# Patient Record
Sex: FEMALE | Race: WHITE | NOT HISPANIC OR LATINO | Employment: FULL TIME | URBAN - METROPOLITAN AREA
[De-identification: names, ages, dates, MRNs, and addresses within clinical notes are randomized per-mention and may not be internally consistent; named-entity substitution may affect disease eponyms.]

---

## 2017-02-03 ENCOUNTER — ALLSCRIPTS OFFICE VISIT (OUTPATIENT)
Dept: OTHER | Facility: OTHER | Age: 32
End: 2017-02-03

## 2017-02-03 LAB — HCG, QUALITATIVE (HISTORICAL): NEGATIVE

## 2018-01-14 VITALS
WEIGHT: 153 LBS | BODY MASS INDEX: 24.01 KG/M2 | SYSTOLIC BLOOD PRESSURE: 110 MMHG | DIASTOLIC BLOOD PRESSURE: 74 MMHG | HEIGHT: 67 IN

## 2018-02-09 ENCOUNTER — OFFICE VISIT (OUTPATIENT)
Dept: OBGYN CLINIC | Facility: CLINIC | Age: 33
End: 2018-02-09
Payer: COMMERCIAL

## 2018-02-09 VITALS
DIASTOLIC BLOOD PRESSURE: 78 MMHG | HEIGHT: 68 IN | BODY MASS INDEX: 26.07 KG/M2 | SYSTOLIC BLOOD PRESSURE: 116 MMHG | WEIGHT: 172 LBS

## 2018-02-09 DIAGNOSIS — Z01.419 ENCOUNTER FOR GYNECOLOGICAL EXAMINATION WITHOUT ABNORMAL FINDING: Primary | ICD-10-CM

## 2018-02-09 DIAGNOSIS — E04.9 ENLARGED THYROID: ICD-10-CM

## 2018-02-09 PROCEDURE — S0612 ANNUAL GYNECOLOGICAL EXAMINA: HCPCS | Performed by: PHYSICIAN ASSISTANT

## 2018-02-09 RX ORDER — ALBUTEROL SULFATE 90 UG/1
2 AEROSOL, METERED RESPIRATORY (INHALATION) EVERY 4 HOURS PRN
COMMUNITY
Start: 2015-09-08 | End: 2020-07-21

## 2018-02-09 NOTE — PROGRESS NOTES
Assessment/Plan:     Problem List Items Addressed This Visit     Encounter for gynecological examination without abnormal finding - Primary     Reviewed pap smear guidelines  Pap with HPV done today  Patient's family hx significant for mother with melanoma and PGM with ovarian cancer, reviewed genetic testing with patient  Information for [facility] genetic counselor given  Reviewed Vitamin E and evening primrose oil as well as decreasing salt and caffeine intake to help with breast pain, reviewed no defined masses today  Most consistent with fibrocystic breast tissue  Return to office for annual or as needed  Relevant Orders    GP Liquid-Based Pap + HPV Plus    Enlarged thyroid     Reviewed with patient diffuse thyromegaly on today's exam will get thyroid ultrasound and blood work done to further evaluate  Relevant Orders    US thyroid    T4, free    TSH, 3rd generation            Subjective:      Patient ID: Guzman Blevins is a 29 y o  y o  female  HPI  30 yo seen for annual exam  Currently on Nexplanon inserted 2/3/2017  Tolerates well  Menses irregular, infrequent  Last pap: 12/28/2016 NILM  Hx of abnormal pap smears ASC-H the year previous  Patient does not want any children and wants  to have vasectomy  Patient reports right sided breast pain, comes and goes  Had ultrasound done on that area previously which was normal  No palpable lumps, skin changes, trauma, nipple discharge, fever, or chills  The following portions of the patient's history were reviewed and updated as appropriate:   She  has a past medical history of Anxiety; Asthma; Cervical lesion; and Degeneration, intervertebral disc, lumbar  She  does not have any pertinent problems on file  She  has a past surgical history that includes Rhinoplasty; Tooth extraction; and Shoulder surgery    Her family history includes Cancer in her mother and paternal uncle; Diabetes in her family and father; Hyperlipidemia in her brother; Hypertension in her family and father; Melanoma in her mother; Ovarian cancer in her paternal grandmother  She  reports that she has never smoked  She has never used smokeless tobacco  She reports that she drinks alcohol  She reports that she does not use drugs  Current Outpatient Prescriptions   Medication Sig Dispense Refill    albuterol (VENTOLIN HFA) 90 mcg/act inhaler Inhale      Etonogestrel (NEXPLANON) 68 MG IMPL Inject under the skin      fluticasone-salmeterol (ADVAIR DISKUS) 500-50 mcg/dose Inhale       No current facility-administered medications for this visit  She is allergic to cat hair extract and pollen extract       Menstrual History:  OB History      Para Term  AB Living    0 0 0 0 0 0    SAB TAB Ectopic Multiple Live Births    0 0 0 0 0         Menarche age: 15  No LMP recorded (lmp unknown)  Patient is not currently having periods (Reason: Premenopausal)  Review of Systems   Constitutional: Negative for fatigue, fever and unexpected weight change  HENT: Negative for dental problem and sinus pressure  Eyes: Negative for visual disturbance  Respiratory: Negative for cough, shortness of breath and wheezing  Cardiovascular: Negative for chest pain  Gastrointestinal: Negative for abdominal pain, blood in stool, constipation, diarrhea, nausea and vomiting  Endocrine: Negative for polydipsia  Genitourinary: Negative for difficulty urinating, dyspareunia, dysuria, frequency, hematuria, pelvic pain and urgency  Musculoskeletal: Negative for arthralgias and back pain  Neurological: Negative for dizziness, seizures, light-headedness and headaches  Psychiatric/Behavioral: Negative for suicidal ideas  The patient is not nervous/anxious  Objective:     Physical Exam   Constitutional: She is oriented to person, place, and time  She appears well-developed and well-nourished     Genitourinary: Vagina normal and uterus normal  There is no rash, tenderness, lesion, injury or Bartholin's cyst on the right labia  There is no rash, tenderness, lesion, injury or Bartholin's cyst on the left labia  Vagina exhibits no lesion  No erythema, tenderness or bleeding in the vagina  No signs of injury around the vagina  No vaginal discharge found  Right adnexum does not display mass, does not display tenderness and does not display fullness  Left adnexum does not display mass, does not display tenderness and does not display fullness  Cervix does not exhibit motion tenderness, lesion or discharge  Uterus is not enlarged, tender, exhibiting a mass, irregular (is regular) or mobile  HENT:   Head: Normocephalic and atraumatic  Neck: Thyromegaly (diffuse) present  Cardiovascular: Normal rate, regular rhythm and normal heart sounds  Exam reveals no gallop and no friction rub  No murmur heard  Pulmonary/Chest: Effort normal and breath sounds normal  No respiratory distress  She has no wheezes  Right breast exhibits tenderness (mildly tender in lateral lower quadrant, no palpable masses)  Right breast exhibits no inverted nipple, no mass, no nipple discharge and no skin change  Left breast exhibits no inverted nipple, no mass, no nipple discharge, no skin change and no tenderness  Breasts are symmetrical  There is no breast swelling  Abdominal: Soft  She exhibits no distension and no mass  There is no tenderness  There is no rebound and no guarding  No hernia  Lymphadenopathy:     She has no cervical adenopathy  Right: No inguinal adenopathy present  Left: No inguinal adenopathy present  Neurological: She is alert and oriented to person, place, and time  Skin: Skin is warm and dry  Psychiatric: She has a normal mood and affect   Her behavior is normal

## 2018-02-09 NOTE — ASSESSMENT & PLAN NOTE
Reviewed pap smear guidelines  Pap with HPV done today  Patient's family hx significant for mother with melanoma and PGM with ovarian cancer, reviewed genetic testing with patient  Information for [facility]'s genetic counselor given  Reviewed Vitamin E and evening primrose oil as well as decreasing salt and caffeine intake to help with breast pain, reviewed no defined masses today  Most consistent with fibrocystic breast tissue  Return to office for annual or as needed

## 2018-02-09 NOTE — ASSESSMENT & PLAN NOTE
Reviewed with patient diffuse thyromegaly on today's exam will get thyroid ultrasound and blood work done to further evaluate

## 2018-02-11 LAB
T4 FREE SERPL-MCNC: 1.1 NG/DL (ref 0.8–1.8)
TSH SERPL-ACNC: 3.29 MIU/L

## 2018-02-14 ENCOUNTER — HOSPITAL ENCOUNTER (OUTPATIENT)
Dept: ULTRASOUND IMAGING | Facility: HOSPITAL | Age: 33
Discharge: HOME | End: 2018-02-14
Attending: PHYSICIAN ASSISTANT
Payer: COMMERCIAL

## 2018-02-14 PROCEDURE — 76536 US EXAM OF HEAD AND NECK: CPT

## 2018-02-16 ENCOUNTER — TELEPHONE (OUTPATIENT)
Dept: OBGYN CLINIC | Facility: CLINIC | Age: 33
End: 2018-02-16

## 2018-02-27 LAB
HPV HR 12 DNA CVX QL NAA+PROBE: NOT DETECTED
HPV16 DNA SPEC QL NAA+PROBE: DETECTED
HPV18 DNA SPEC QL NAA+PROBE: NOT DETECTED
THIN PREP CVX: ABNORMAL

## 2018-02-28 PROCEDURE — 57454 BX/CURETT OF CERVIX W/SCOPE: CPT | Performed by: OBSTETRICS & GYNECOLOGY

## 2018-02-28 NOTE — PROGRESS NOTES
Colposcopy  Date/Time: 2/28/2018 10:35 AM  Performed by: Rah Natarajan by: Lubna Berumen     Consent:     Consent obtained:  Verbal and written    Consent given by:  Patient    Procedural risks discussed:  Bleeding    Patient questions answered: yes      Patient agrees, verbalizes understanding, and wants to proceed: yes    Pre-procedure:     Pre-procedure timeout performed: yes      Prepped with: acetic acid    Indication:     Indication:  ASC-H  Procedure:     Procedure: Colposcopy w/ cervical biopsy and ECC      Under satisfactory analgesia the patient was prepped and draped in the dorsal lithotomy position: yes      [place] speculum was placed in the vagina: yes      Under colposcopic examination the transition zone was seen in entirety: yes      Endocervix was curetted using a Kevorkian curette: yes      Cervical biopsy performed with a cervical biopsy punch: yes      Monsel's solution was applied: yes      Biopsy(s): yes      Location:  6:00    Specimen to pathology: yes    Post-procedure:     Findings: Bleeding, Friable cervix, Mosaicism and White epithelium      Impression: Low grade cervical dysplasia      Patient tolerance of procedure: Tolerated well, no immediate complications  Comments:      Higinio Durand is here today for colposcopic evaluation  Her Pap smear had come back with atypical squamous cells of undetermined significance cannot rule out high-grade lesion, and positive HPV 16  She reports that she had had an abnormal Pap smear in 25years old and underwent colposcopic evaluation and a freezing at that time  She has had no Pap smears abnormality since then  She currently is have the Nexplanon placed for contraception and is doing well with it rarely gets a menses when she does this very light in non problematic  She is in a stable relationship and has had no new partners  Reviewed the Pap smear with her and we reviewed colposcopic procedure  She gave us her consent and we proceeded  The colposcopic findings showed a area in the endocervical canal which appeared to be is low grade squamous intraepithelial lesion on Pap smear is taken approximately 6 o'clock  ECC was performed as well and Monsel's applied for hemostasis  The patient tolerated procedure well will review the results with her when available  And follow-up as indicated by the result

## 2018-03-01 ENCOUNTER — PROCEDURE VISIT (OUTPATIENT)
Dept: OBGYN CLINIC | Facility: CLINIC | Age: 33
End: 2018-03-01
Payer: COMMERCIAL

## 2018-03-01 VITALS — DIASTOLIC BLOOD PRESSURE: 60 MMHG | SYSTOLIC BLOOD PRESSURE: 128 MMHG | BODY MASS INDEX: 26.46 KG/M2 | WEIGHT: 174 LBS

## 2018-03-01 DIAGNOSIS — R87.810 ASCUS WITH POSITIVE HIGH RISK HPV CERVICAL: Primary | ICD-10-CM

## 2018-03-01 DIAGNOSIS — R87.611 PAP SMEAR OF CERVIX WITH ASCUS, CANNOT EXCLUDE HGSIL: ICD-10-CM

## 2018-03-01 DIAGNOSIS — R87.610 ASCUS WITH POSITIVE HIGH RISK HPV CERVICAL: Primary | ICD-10-CM

## 2018-03-06 ENCOUNTER — TELEPHONE (OUTPATIENT)
Dept: OBGYN CLINIC | Facility: CLINIC | Age: 33
End: 2018-03-06

## 2018-03-06 LAB — OTHER STN CVX: ABNORMAL

## 2018-03-06 NOTE — TELEPHONE ENCOUNTER
[facility]   Making sure you received abnormal test result

## 2018-03-07 LAB — BIOPSY SPEC-IMP: NORMAL

## 2018-03-14 ENCOUNTER — PROCEDURE VISIT (OUTPATIENT)
Dept: OBGYN CLINIC | Facility: CLINIC | Age: 33
End: 2018-03-14
Payer: COMMERCIAL

## 2018-03-14 VITALS
BODY MASS INDEX: 26.22 KG/M2 | DIASTOLIC BLOOD PRESSURE: 72 MMHG | SYSTOLIC BLOOD PRESSURE: 116 MMHG | HEIGHT: 68 IN | WEIGHT: 173 LBS

## 2018-03-14 DIAGNOSIS — Z30.46 NEXPLANON REMOVAL: Primary | ICD-10-CM

## 2018-03-14 PROBLEM — Z01.419 ENCOUNTER FOR GYNECOLOGICAL EXAMINATION WITHOUT ABNORMAL FINDING: Status: RESOLVED | Noted: 2018-02-09 | Resolved: 2018-03-14

## 2018-03-14 PROCEDURE — 11982 REMOVE DRUG IMPLANT DEVICE: CPT | Performed by: PHYSICIAN ASSISTANT

## 2018-03-14 NOTE — PROGRESS NOTES
Remove and insert drug implant  Date/Time: 3/14/2018 9:44 AM  Performed by: Mandy Hammond  Authorized by: Mandy Hammond     Consent:     Consent obtained:  Verbal    Consent given by:  Patient    Procedural risks discussed:  Infection    Patient questions answered: yes      Patient agrees, verbalizes understanding, and wants to proceed: yes    Indication:     Indication: Presence of non-biodegradable drug delivery implant    Pre-procedure:     Prepped with: povidone-iodine      Local anesthetic:  Lidocaine 1%  Procedure:     Procedure:  Removal    Small stab incision was made in arm: yes      Left/right:  Right  Comments:      Pt tolerated procedure well  Pt desires removal of Nexplanon in order to "get hormones in order" Pt does not desire children at all and partner plans vasectomy  R/w pt to plan condoms and withdrawal for [facility] until vasectomy completed

## 2018-03-15 ENCOUNTER — OFFICE VISIT (OUTPATIENT)
Dept: OBGYN CLINIC | Facility: CLINIC | Age: 33
End: 2018-03-15
Payer: COMMERCIAL

## 2018-03-15 ENCOUNTER — TRANSCRIBE ORDERS (OUTPATIENT)
Dept: OBGYN CLINIC | Facility: CLINIC | Age: 33
End: 2018-03-15

## 2018-03-15 VITALS
WEIGHT: 175 LBS | BODY MASS INDEX: 26.52 KG/M2 | SYSTOLIC BLOOD PRESSURE: 118 MMHG | HEIGHT: 68 IN | DIASTOLIC BLOOD PRESSURE: 62 MMHG

## 2018-03-15 DIAGNOSIS — R87.611 PAP SMEAR OF CERVIX WITH ASCUS, CANNOT EXCLUDE HGSIL: ICD-10-CM

## 2018-03-15 DIAGNOSIS — Z01.818 PREOPERATIVE TESTING: Primary | ICD-10-CM

## 2018-03-15 DIAGNOSIS — R87.610 ASCUS WITH POSITIVE HIGH RISK HPV CERVICAL: ICD-10-CM

## 2018-03-15 DIAGNOSIS — N87.1 MODERATE DYSPLASIA OF CERVIX (CIN II): Primary | ICD-10-CM

## 2018-03-15 DIAGNOSIS — R87.810 ASCUS WITH POSITIVE HIGH RISK HPV CERVICAL: ICD-10-CM

## 2018-03-15 PROCEDURE — 99213 OFFICE O/P EST LOW 20 MIN: CPT | Performed by: OBSTETRICS & GYNECOLOGY

## 2018-03-15 NOTE — PROGRESS NOTES
Assessment/Plan: Moderate to severe dysplasia noted on cervical biopsy during colposcopic directed biopsies, negative E cc  Would like to proceed with LEEP for further diagnosis and treatment  Subjective:      Patient ID: Jhonathan Pathak is a 29 y o  female  Ezell Paget is here today to review options for follow-up her colposcopically directed biopsies  She had returned with a Pap smear showing atypical squamous cells of undetermined significance cannot rule out a high-grade lesion and high risk HPV 16  She underwent colposcopic directed biopsies and a biopsy from the 6 o'clock region did show ERNIE 2-3  Her endocervical curettage was negative  Reviewed the risk of following the findings conservatively versus proceeding with a LEEP  She is not planning on childbearing in the future  Although we did review the risks of a cervical cone biopsy with incompetent cervix  She is interested in proceeding with the LEEP to both treat and further diagnose the cervical changes  We again reviewed the risks and benefits associated with the procedure and the anticipated postoperative follow-up  We will go ahead and schedule the LEEP for the moderate to severe cervical dysplasia  The following portions of the patient's history were reviewed and updated as appropriate:   She  has a past medical history of Anxiety; Asthma; Cervical lesion; and Degeneration, intervertebral disc, lumbar    She   Patient Active Problem List    Diagnosis Date Noted    Moderate dysplasia of cervix (ERNIE II) 03/15/2018    Nexplanon removal 03/14/2018    ASCUS with positive high risk HPV cervical 03/01/2018    Pap smear of cervix with ASCUS, cannot exclude HGSIL 03/01/2018    Enlarged thyroid 02/09/2018    Other tenosynovitis of hand and wrist 01/29/2014    Neck pain 10/23/2013    DDD (degenerative disc disease), cervical 09/04/2013    Lower back pain 09/04/2013    Myofascial pain syndrome 09/04/2013    Disc degeneration, lumbar 07/26/2013    Allergic rhinitis 07/25/2013     She  has a past surgical history that includes Rhinoplasty; Tooth extraction; and Shoulder surgery  Her family history includes Cancer in her mother and paternal uncle; Diabetes in her family and father; Hyperlipidemia in her brother; Hypertension in her family and father; Melanoma in her mother; Ovarian cancer in her paternal grandmother  She  reports that she has never smoked  She has never used smokeless tobacco  She reports that she drinks alcohol  She reports that she does not use drugs  Current Outpatient Prescriptions   Medication Sig Dispense Refill    albuterol (VENTOLIN HFA) 90 mcg/act inhaler Inhale      Etonogestrel (NEXPLANON) 68 MG IMPL Inject under the skin      fluticasone-salmeterol (ADVAIR DISKUS) 500-50 mcg/dose Inhale       No current facility-administered medications for this visit  Current Outpatient Prescriptions on File Prior to Visit   Medication Sig    albuterol (VENTOLIN HFA) 90 mcg/act inhaler Inhale    Etonogestrel (NEXPLANON) 68 MG IMPL Inject under the skin    fluticasone-salmeterol (ADVAIR DISKUS) 500-50 mcg/dose Inhale     No current facility-administered medications on file prior to visit  She is allergic to cat hair extract and pollen extract       Review of Systems   Constitutional: Positive for fatigue  Negative for fever and unexpected weight change  HENT: Negative for dental problem, mouth sores, nosebleeds, rhinorrhea, sinus pain, sinus pressure and sore throat  Eyes: Negative for pain, discharge and visual disturbance  Respiratory: Negative for cough, chest tightness, shortness of breath and wheezing  Cardiovascular: Negative for chest pain, palpitations and leg swelling  Gastrointestinal: Negative for blood in stool, constipation, diarrhea, nausea and vomiting  Endocrine: Negative for polydipsia     Genitourinary: Negative for difficulty urinating, dyspareunia, dysuria, menstrual problem, pelvic pain, urgency, vaginal discharge and vaginal pain  Musculoskeletal: Negative for arthralgias, back pain and joint swelling  Allergic/Immunologic: Negative for environmental allergies  Neurological: Negative for seizures, light-headedness and headaches  Hematological: Does not bruise/bleed easily  Psychiatric/Behavioral: Negative for sleep disturbance  The patient is not nervous/anxious  Depression         Objective:             Physical Exam   Constitutional: She is oriented to person, place, and time  She appears well-developed and well-nourished  No distress  Young white female   Cardiovascular: Normal rate, regular rhythm and normal heart sounds  No murmur heard  Pulmonary/Chest: Effort normal and breath sounds normal  No respiratory distress  She has no wheezes  She has no rales  Neurological: She is alert and oriented to person, place, and time

## 2018-03-16 ENCOUNTER — LAB REQUISITION (OUTPATIENT)
Dept: LAB | Facility: HOSPITAL | Age: 33
End: 2018-03-16
Payer: COMMERCIAL

## 2018-03-16 ENCOUNTER — APPOINTMENT (OUTPATIENT)
Dept: LAB | Facility: CLINIC | Age: 33
End: 2018-03-16
Payer: COMMERCIAL

## 2018-03-16 DIAGNOSIS — Z01.818 ENCOUNTER FOR OTHER PREPROCEDURAL EXAMINATION: ICD-10-CM

## 2018-03-16 DIAGNOSIS — Z01.818 PREOPERATIVE TESTING: ICD-10-CM

## 2018-03-16 LAB
ABO GROUP BLD: NORMAL
ALBUMIN SERPL BCP-MCNC: 3.8 G/DL (ref 3.5–5)
ALP SERPL-CCNC: 94 U/L (ref 46–116)
ALT SERPL W P-5'-P-CCNC: 19 U/L (ref 12–78)
ANION GAP SERPL CALCULATED.3IONS-SCNC: 6 MMOL/L (ref 4–13)
AST SERPL W P-5'-P-CCNC: 15 U/L (ref 5–45)
BASOPHILS # BLD AUTO: 0.04 THOUSANDS/ΜL (ref 0–0.1)
BASOPHILS NFR BLD AUTO: 1 % (ref 0–1)
BILIRUB SERPL-MCNC: 0.41 MG/DL (ref 0.2–1)
BLD GP AB SCN SERPL QL: NEGATIVE
BUN SERPL-MCNC: 10 MG/DL (ref 5–25)
CALCIUM SERPL-MCNC: 8.7 MG/DL (ref 8.3–10.1)
CHLORIDE SERPL-SCNC: 106 MMOL/L (ref 100–108)
CO2 SERPL-SCNC: 27 MMOL/L (ref 21–32)
CREAT SERPL-MCNC: 0.58 MG/DL (ref 0.6–1.3)
EOSINOPHIL # BLD AUTO: 0.11 THOUSAND/ΜL (ref 0–0.61)
EOSINOPHIL NFR BLD AUTO: 2 % (ref 0–6)
ERYTHROCYTE [DISTWIDTH] IN BLOOD BY AUTOMATED COUNT: 13 % (ref 11.6–15.1)
GFR SERPL CREATININE-BSD FRML MDRD: 124 ML/MIN/1.73SQ M
GLUCOSE P FAST SERPL-MCNC: 82 MG/DL (ref 65–99)
HCT VFR BLD AUTO: 39.4 % (ref 34.8–46.1)
HGB BLD-MCNC: 13.3 G/DL (ref 11.5–15.4)
LYMPHOCYTES # BLD AUTO: 2.73 THOUSANDS/ΜL (ref 0.6–4.47)
LYMPHOCYTES NFR BLD AUTO: 37 % (ref 14–44)
MCH RBC QN AUTO: 29.5 PG (ref 26.8–34.3)
MCHC RBC AUTO-ENTMCNC: 33.8 G/DL (ref 31.4–37.4)
MCV RBC AUTO: 87 FL (ref 82–98)
MONOCYTES # BLD AUTO: 0.64 THOUSAND/ΜL (ref 0.17–1.22)
MONOCYTES NFR BLD AUTO: 9 % (ref 4–12)
NEUTROPHILS # BLD AUTO: 3.88 THOUSANDS/ΜL (ref 1.85–7.62)
NEUTS SEG NFR BLD AUTO: 51 % (ref 43–75)
NRBC BLD AUTO-RTO: 0 /100 WBCS
PLATELET # BLD AUTO: 311 THOUSANDS/UL (ref 149–390)
PMV BLD AUTO: 10 FL (ref 8.9–12.7)
POTASSIUM SERPL-SCNC: 4 MMOL/L (ref 3.5–5.3)
PROT SERPL-MCNC: 7.2 G/DL (ref 6.4–8.2)
RBC # BLD AUTO: 4.51 MILLION/UL (ref 3.81–5.12)
RH BLD: POSITIVE
SODIUM SERPL-SCNC: 139 MMOL/L (ref 136–145)
SPECIMEN EXPIRATION DATE: NORMAL
T4 FREE SERPL-MCNC: 0.88 NG/DL (ref 0.76–1.46)
TSH SERPL DL<=0.05 MIU/L-ACNC: 1.65 UIU/ML (ref 0.36–3.74)
WBC # BLD AUTO: 7.44 THOUSAND/UL (ref 4.31–10.16)

## 2018-03-16 PROCEDURE — 80053 COMPREHEN METABOLIC PANEL: CPT

## 2018-03-16 PROCEDURE — 84439 ASSAY OF FREE THYROXINE: CPT

## 2018-03-16 PROCEDURE — 86900 BLOOD TYPING SEROLOGIC ABO: CPT | Performed by: OBSTETRICS & GYNECOLOGY

## 2018-03-16 PROCEDURE — 86850 RBC ANTIBODY SCREEN: CPT | Performed by: OBSTETRICS & GYNECOLOGY

## 2018-03-16 PROCEDURE — 86901 BLOOD TYPING SEROLOGIC RH(D): CPT | Performed by: OBSTETRICS & GYNECOLOGY

## 2018-03-16 PROCEDURE — 36415 COLL VENOUS BLD VENIPUNCTURE: CPT

## 2018-03-16 PROCEDURE — 85025 COMPLETE CBC W/AUTO DIFF WBC: CPT

## 2018-03-16 PROCEDURE — 84443 ASSAY THYROID STIM HORMONE: CPT

## 2018-03-19 RX ORDER — BUDESONIDE AND FORMOTEROL FUMARATE DIHYDRATE 160; 4.5 UG/1; UG/1
2 AEROSOL RESPIRATORY (INHALATION) DAILY
COMMUNITY
End: 2020-07-21 | Stop reason: SDUPTHER

## 2018-03-19 RX ORDER — TURMERIC ROOT EXTRACT 500 MG
TABLET ORAL
COMMUNITY
End: 2019-06-14

## 2018-03-19 RX ORDER — MULTIVITAMIN
1 TABLET ORAL DAILY
COMMUNITY
End: 2019-06-14

## 2018-03-22 ENCOUNTER — ANESTHESIA EVENT (OUTPATIENT)
Dept: PERIOP | Facility: HOSPITAL | Age: 33
End: 2018-03-22
Payer: COMMERCIAL

## 2018-03-23 ENCOUNTER — ANESTHESIA (OUTPATIENT)
Dept: PERIOP | Facility: HOSPITAL | Age: 33
End: 2018-03-23
Payer: COMMERCIAL

## 2018-03-23 ENCOUNTER — HOSPITAL ENCOUNTER (OUTPATIENT)
Facility: HOSPITAL | Age: 33
Setting detail: OUTPATIENT SURGERY
Discharge: HOME | End: 2018-03-23
Attending: OBSTETRICS & GYNECOLOGY | Admitting: OBSTETRICS & GYNECOLOGY
Payer: COMMERCIAL

## 2018-03-23 VITALS
HEART RATE: 79 BPM | HEIGHT: 68 IN | BODY MASS INDEX: 25.76 KG/M2 | OXYGEN SATURATION: 97 % | WEIGHT: 170 LBS | SYSTOLIC BLOOD PRESSURE: 111 MMHG | DIASTOLIC BLOOD PRESSURE: 65 MMHG | TEMPERATURE: 99.4 F | RESPIRATION RATE: 18 BRPM

## 2018-03-23 DIAGNOSIS — N87.1 MODERATE DYSPLASIA OF CERVIX (CIN II): ICD-10-CM

## 2018-03-23 DIAGNOSIS — Z98.890 S/P LEEP OF CERVIX: Primary | ICD-10-CM

## 2018-03-23 LAB — EXT PREGNANCY TEST URINE: NEGATIVE

## 2018-03-23 PROCEDURE — 88305 TISSUE EXAM BY PATHOLOGIST: CPT | Performed by: PATHOLOGY

## 2018-03-23 PROCEDURE — 57522 CONIZATION OF CERVIX: CPT | Performed by: OBSTETRICS & GYNECOLOGY

## 2018-03-23 PROCEDURE — 88307 TISSUE EXAM BY PATHOLOGIST: CPT | Performed by: PATHOLOGY

## 2018-03-23 PROCEDURE — 81025 URINE PREGNANCY TEST: CPT | Performed by: OBSTETRICS & GYNECOLOGY

## 2018-03-23 RX ORDER — PROMETHAZINE HYDROCHLORIDE 25 MG/ML
12.5 INJECTION, SOLUTION INTRAMUSCULAR; INTRAVENOUS ONCE AS NEEDED
Status: DISCONTINUED | OUTPATIENT
Start: 2018-03-23 | End: 2018-03-23 | Stop reason: HOSPADM

## 2018-03-23 RX ORDER — LIDOCAINE HYDROCHLORIDE 10 MG/ML
INJECTION, SOLUTION INFILTRATION; PERINEURAL AS NEEDED
Status: DISCONTINUED | OUTPATIENT
Start: 2018-03-23 | End: 2018-03-23 | Stop reason: SURG

## 2018-03-23 RX ORDER — ONDANSETRON 2 MG/ML
INJECTION INTRAMUSCULAR; INTRAVENOUS AS NEEDED
Status: DISCONTINUED | OUTPATIENT
Start: 2018-03-23 | End: 2018-03-23 | Stop reason: SURG

## 2018-03-23 RX ORDER — ACETAMINOPHEN 325 MG/1
650 TABLET ORAL EVERY 4 HOURS PRN
Qty: 30 TABLET | Refills: 0 | Status: CANCELLED
Start: 2018-03-23

## 2018-03-23 RX ORDER — KETOROLAC TROMETHAMINE 30 MG/ML
INJECTION, SOLUTION INTRAMUSCULAR; INTRAVENOUS AS NEEDED
Status: DISCONTINUED | OUTPATIENT
Start: 2018-03-23 | End: 2018-03-23 | Stop reason: SURG

## 2018-03-23 RX ORDER — FENTANYL CITRATE/PF 50 MCG/ML
25 SYRINGE (ML) INJECTION
Status: COMPLETED | OUTPATIENT
Start: 2018-03-23 | End: 2018-03-23

## 2018-03-23 RX ORDER — MIDAZOLAM HYDROCHLORIDE 1 MG/ML
INJECTION INTRAMUSCULAR; INTRAVENOUS AS NEEDED
Status: DISCONTINUED | OUTPATIENT
Start: 2018-03-23 | End: 2018-03-23 | Stop reason: SURG

## 2018-03-23 RX ORDER — OXYCODONE HYDROCHLORIDE 10 MG/1
10 TABLET ORAL EVERY 4 HOURS PRN
Status: DISCONTINUED | OUTPATIENT
Start: 2018-03-23 | End: 2018-03-23 | Stop reason: HOSPADM

## 2018-03-23 RX ORDER — IBUPROFEN 400 MG/1
600 TABLET ORAL EVERY 6 HOURS PRN
Status: DISCONTINUED | OUTPATIENT
Start: 2018-03-23 | End: 2018-03-23 | Stop reason: HOSPADM

## 2018-03-23 RX ORDER — ACETIC ACID 5 %
LIQUID (ML) MISCELLANEOUS AS NEEDED
Status: DISCONTINUED | OUTPATIENT
Start: 2018-03-23 | End: 2018-03-23 | Stop reason: HOSPADM

## 2018-03-23 RX ORDER — IODINE SOLUTION STRONG 5% (LUGOL'S) 5 %
SOLUTION ORAL AS NEEDED
Status: DISCONTINUED | OUTPATIENT
Start: 2018-03-23 | End: 2018-03-23 | Stop reason: HOSPADM

## 2018-03-23 RX ORDER — PROPOFOL 10 MG/ML
INJECTION, EMULSION INTRAVENOUS AS NEEDED
Status: DISCONTINUED | OUTPATIENT
Start: 2018-03-23 | End: 2018-03-23 | Stop reason: SURG

## 2018-03-23 RX ORDER — MAGNESIUM HYDROXIDE 1200 MG/15ML
LIQUID ORAL AS NEEDED
Status: DISCONTINUED | OUTPATIENT
Start: 2018-03-23 | End: 2018-03-23 | Stop reason: HOSPADM

## 2018-03-23 RX ORDER — ONDANSETRON 2 MG/ML
4 INJECTION INTRAMUSCULAR; INTRAVENOUS EVERY 4 HOURS PRN
Status: DISCONTINUED | OUTPATIENT
Start: 2018-03-23 | End: 2018-03-23 | Stop reason: HOSPADM

## 2018-03-23 RX ORDER — ONDANSETRON 2 MG/ML
4 INJECTION INTRAMUSCULAR; INTRAVENOUS EVERY 6 HOURS PRN
Status: DISCONTINUED | OUTPATIENT
Start: 2018-03-23 | End: 2018-03-23 | Stop reason: HOSPADM

## 2018-03-23 RX ORDER — FERRIC SUBSULFATE 0.21 G/G
LIQUID TOPICAL AS NEEDED
Status: DISCONTINUED | OUTPATIENT
Start: 2018-03-23 | End: 2018-03-23 | Stop reason: HOSPADM

## 2018-03-23 RX ORDER — SODIUM CHLORIDE, SODIUM LACTATE, POTASSIUM CHLORIDE, CALCIUM CHLORIDE 600; 310; 30; 20 MG/100ML; MG/100ML; MG/100ML; MG/100ML
125 INJECTION, SOLUTION INTRAVENOUS CONTINUOUS
Status: DISCONTINUED | OUTPATIENT
Start: 2018-03-23 | End: 2018-03-23 | Stop reason: HOSPADM

## 2018-03-23 RX ORDER — FENTANYL CITRATE 50 UG/ML
INJECTION, SOLUTION INTRAMUSCULAR; INTRAVENOUS AS NEEDED
Status: DISCONTINUED | OUTPATIENT
Start: 2018-03-23 | End: 2018-03-23 | Stop reason: SURG

## 2018-03-23 RX ORDER — OXYCODONE HYDROCHLORIDE 5 MG/1
5 TABLET ORAL EVERY 4 HOURS PRN
Status: DISCONTINUED | OUTPATIENT
Start: 2018-03-23 | End: 2018-03-23 | Stop reason: HOSPADM

## 2018-03-23 RX ORDER — OXYCODONE HYDROCHLORIDE AND ACETAMINOPHEN 5; 325 MG/1; MG/1
1 TABLET ORAL ONCE
Status: COMPLETED | OUTPATIENT
Start: 2018-03-23 | End: 2018-03-23

## 2018-03-23 RX ORDER — IBUPROFEN 600 MG/1
600 TABLET ORAL EVERY 6 HOURS PRN
Qty: 30 TABLET | Refills: 0 | Status: CANCELLED
Start: 2018-03-23

## 2018-03-23 RX ORDER — ACETAMINOPHEN 325 MG/1
650 TABLET ORAL EVERY 6 HOURS PRN
Status: DISCONTINUED | OUTPATIENT
Start: 2018-03-23 | End: 2018-03-23 | Stop reason: HOSPADM

## 2018-03-23 RX ORDER — MORPHINE SULFATE 2 MG/ML
2 INJECTION, SOLUTION INTRAMUSCULAR; INTRAVENOUS
Status: DISCONTINUED | OUTPATIENT
Start: 2018-03-23 | End: 2018-03-23 | Stop reason: HOSPADM

## 2018-03-23 RX ORDER — OXYCODONE HYDROCHLORIDE AND ACETAMINOPHEN 5; 325 MG/1; MG/1
2 TABLET ORAL ONCE
Status: DISCONTINUED | OUTPATIENT
Start: 2018-03-23 | End: 2018-03-23 | Stop reason: HOSPADM

## 2018-03-23 RX ADMIN — PROPOFOL 200 MG: 10 INJECTION, EMULSION INTRAVENOUS at 09:01

## 2018-03-23 RX ADMIN — FENTANYL CITRATE 50 MCG: 50 INJECTION, SOLUTION INTRAMUSCULAR; INTRAVENOUS at 09:05

## 2018-03-23 RX ADMIN — FENTANYL CITRATE 25 MCG: 50 INJECTION, SOLUTION INTRAMUSCULAR; INTRAVENOUS at 09:24

## 2018-03-23 RX ADMIN — OXYCODONE HYDROCHLORIDE AND ACETAMINOPHEN 1 TABLET: 5; 325 TABLET ORAL at 11:06

## 2018-03-23 RX ADMIN — LIDOCAINE HYDROCHLORIDE 50 MG: 10 INJECTION, SOLUTION INFILTRATION; PERINEURAL at 09:01

## 2018-03-23 RX ADMIN — FENTANYL CITRATE 25 MCG: 50 INJECTION, SOLUTION INTRAMUSCULAR; INTRAVENOUS at 10:03

## 2018-03-23 RX ADMIN — FENTANYL CITRATE 25 MCG: 50 INJECTION, SOLUTION INTRAMUSCULAR; INTRAVENOUS at 10:11

## 2018-03-23 RX ADMIN — ONDANSETRON 4 MG: 2 INJECTION INTRAMUSCULAR; INTRAVENOUS at 08:58

## 2018-03-23 RX ADMIN — FENTANYL CITRATE 25 MCG: 50 INJECTION, SOLUTION INTRAMUSCULAR; INTRAVENOUS at 09:52

## 2018-03-23 RX ADMIN — FENTANYL CITRATE 25 MCG: 50 INJECTION, SOLUTION INTRAMUSCULAR; INTRAVENOUS at 09:38

## 2018-03-23 RX ADMIN — SODIUM CHLORIDE, SODIUM LACTATE, POTASSIUM CHLORIDE, AND CALCIUM CHLORIDE 125 ML/HR: .6; .31; .03; .02 INJECTION, SOLUTION INTRAVENOUS at 08:09

## 2018-03-23 RX ADMIN — FENTANYL CITRATE 25 MCG: 50 INJECTION, SOLUTION INTRAMUSCULAR; INTRAVENOUS at 09:57

## 2018-03-23 RX ADMIN — KETOROLAC TROMETHAMINE 30 MG: 30 INJECTION, SOLUTION INTRAMUSCULAR at 09:31

## 2018-03-23 RX ADMIN — MIDAZOLAM HYDROCHLORIDE 2 MG: 1 INJECTION, SOLUTION INTRAMUSCULAR; INTRAVENOUS at 08:55

## 2018-03-23 NOTE — OP NOTE
OPERATIVE REPORT  PATIENT NAME: Jhonathan Pathak    :  1985  MRN: [id number]  Pt Location: [facility] OR ROOM 03    SURGERY DATE: 3/23/2018    Surgeon(s) and Role:     * Nimesh Kim MD - Primary     * John Can MD - Assisting    Preop Diagnosis:  Moderate dysplasia of cervix (ERNIE II) [N87 1]    Post-Op Diagnosis Codes:     * Moderate dysplasia of cervix (ERNIE II) [N87 1]    Procedure(s) (LRB):  BIOPSY LEEP CERVIX (N/A)    Specimen(s):  ID Type Source Tests Collected by Time Destination   1 : Cervical Biopsy, with marking stich at 12 O'Clock Tissue Cervix TISSUE EXAM Nimesh Kim MD 3/23/2018 [id number]    2 : ECC Tissue Cervix, Endocervical TISSUE EXAM Nimesh Kim MD 3/23/2018 [id number]        Estimated Blood Loss:   Minimal    Drains:  None     Anesthesia Type:   General LMA    Operative Indications: Moderate dysplasia of cervix (ERNIE II) [N87 1]    Operative Findings:  Normal appearing external genitalia, perineum, and vagina  Bimanual exam demonstrated anteverted uterus with no palpable adnexal masses or fullness  Cervix approximately 2cm in diameter, no obvious lesions  Acetowhite changes and decreased Lugol's solution at 6 o'clock position    Complications:   None    Procedure and Technique:  Brief History  Pt is a 32yo female with hx of ASCUS and HR HPV 16 positive on pap smear and follow-up colposcopy with biopsy demonstrating ERNIE 2-3 at 6 o'clock position  Recommendation to proceed with LEEP  All risks, benefits, and alternatives to the procedure were discussed with the patient and she had the opportunity to ask questions  Informed consent was obtained  Description of Procedure    Patient was taken to the operating room were a time out was performed to confirm correct patient and correct procedure  General LMA anesthesia (LMA) was administered and the patient was positioned on the OR table in the dorsal lithotomy position   All pressure points were padded and a linda hugger was placed to maintain control of core body temperature  A bimanual exam was performed and the uterus was noted to be anteverted, normal in size and consistency with no palpable adnexal masses or fullness  The patient was prepped and draped in the usual sterile fashion  Operative Technique    A coated, weighted speculum was inserted into the vagina and used to visualize the cervix  Cervix was grasped with a coated single toothed tenaculum  The transformation zone was identified  Acetic acid followed by Lugol's solution were applied to the cervix and a lesion was identified at 6 o'clock position  A Loop electrode (15mm x 15mm) was selected and used to excise the lesion using 90/60 cut/cautery setting  Cervical specimen was tagged at the 12'oclock position and sent for pathology  The cervical bed was cauterized using a ball tip Bovie electrocautery, taking care to avoid the cervical canal  Surgicel with Monsel's solution was applied  Good hemostasis was confirmed at the conclusion of the procedure  Single toothed tenaculum was removed from the anterior lip of the cervix  Good hemostasis was confirmed at the tenaculum puncture sites  Coated speculum was removed from vagina  At the conclusion of the procedure, all needle, sponge, and instrument counts were noted to be correct x2  Patient tolerated the procedure well and was transferred to PACU in stable condition prior to discharge with follow up in 1-2 weeks  Dr Joyce Astudillo was present and participated in all key portions of the case      Patient Disposition:  PACU  and hemodynamically stable    SIGNATURE: Shelby Hickey MD  DATE: March 23, 2018  TIME: 9:42 AM

## 2018-03-23 NOTE — ANESTHESIA PREPROCEDURE EVALUATION
Review of Systems/Medical History  Patient summary reviewed  Chart reviewed  No history of anesthetic complications     Cardiovascular  Negative cardio ROS Exercise tolerance: good,     Pulmonary  Not a smoker , Asthma (uses rescue inhaler ~1x per month, hospitalized as a child but never intubated for asthma related b reathign issues, uses symbicort daily): well controlled/ stable , No recent URI ,        GI/Hepatic  Negative GI/hepatic ROS     Comment: COnfirmed NPO appropriate     Negative  ROS        Endo/Other  History of thyroid disease (enlarged, no meds) ,      GYN  Not currently pregnant (beta HCg negative in preop holding) ,     Comment: Cervical dysplagia, ERNIE II     Hematology  Negative hematology ROS      Musculoskeletal    Arthritis     Neurology    Neuromuscular disease (myofascial pain syndrome) ,    Psychology   Anxiety,              Physical Exam    Airway    Mallampati score: II  TM Distance: >3 FB  Neck ROM: full     Dental   No notable dental hx     Cardiovascular  Comment: Negative ROS, Rhythm: regular, Rate: normal,     Pulmonary  Breath sounds clear to auscultation, No wheezes,     Other Findings        Anesthesia Plan  ASA Score- 2     Anesthesia Type- IV sedation with anesthesia and general with ASA Monitors  Additional Monitors:   Airway Plan: LMA  Plan Factors-    Induction- intravenous  Postoperative Plan-     Informed Consent- Anesthetic plan and risks discussed with patient

## 2018-03-23 NOTE — H&P (VIEW-ONLY)
Assessment/Plan: Moderate to severe dysplasia noted on cervical biopsy during colposcopic directed biopsies, negative E cc  Would like to proceed with LEEP for further diagnosis and treatment  Subjective:      Patient ID: Breanne Lee is a 29 y o  female  Manish Dhaliwal is here today to review options for follow-up her colposcopically directed biopsies  She had returned with a Pap smear showing atypical squamous cells of undetermined significance cannot rule out a high-grade lesion and high risk HPV 16  She underwent colposcopic directed biopsies and a biopsy from the 6 o'clock region did show ERNIE 2-3  Her endocervical curettage was negative  Reviewed the risk of following the findings conservatively versus proceeding with a LEEP  She is not planning on childbearing in the future  Although we did review the risks of a cervical cone biopsy with incompetent cervix  She is interested in proceeding with the LEEP to both treat and further diagnose the cervical changes  We again reviewed the risks and benefits associated with the procedure and the anticipated postoperative follow-up  We will go ahead and schedule the LEEP for the moderate to severe cervical dysplasia  The following portions of the patient's history were reviewed and updated as appropriate:   She  has a past medical history of Anxiety; Asthma; Cervical lesion; and Degeneration, intervertebral disc, lumbar    She   Patient Active Problem List    Diagnosis Date Noted    Moderate dysplasia of cervix (ERNIE II) 03/15/2018    Nexplanon removal 03/14/2018    ASCUS with positive high risk HPV cervical 03/01/2018    Pap smear of cervix with ASCUS, cannot exclude HGSIL 03/01/2018    Enlarged thyroid 02/09/2018    Other tenosynovitis of hand and wrist 01/29/2014    Neck pain 10/23/2013    DDD (degenerative disc disease), cervical 09/04/2013    Lower back pain 09/04/2013    Myofascial pain syndrome 09/04/2013    Disc degeneration, lumbar 07/26/2013    Allergic rhinitis 07/25/2013     She  has a past surgical history that includes Rhinoplasty; Tooth extraction; and Shoulder surgery  Her family history includes Cancer in her mother and paternal uncle; Diabetes in her family and father; Hyperlipidemia in her brother; Hypertension in her family and father; Melanoma in her mother; Ovarian cancer in her paternal grandmother  She  reports that she has never smoked  She has never used smokeless tobacco  She reports that she drinks alcohol  She reports that she does not use drugs  Current Outpatient Prescriptions   Medication Sig Dispense Refill    albuterol (VENTOLIN HFA) 90 mcg/act inhaler Inhale      Etonogestrel (NEXPLANON) 68 MG IMPL Inject under the skin      fluticasone-salmeterol (ADVAIR DISKUS) 500-50 mcg/dose Inhale       No current facility-administered medications for this visit  Current Outpatient Prescriptions on File Prior to Visit   Medication Sig    albuterol (VENTOLIN HFA) 90 mcg/act inhaler Inhale    Etonogestrel (NEXPLANON) 68 MG IMPL Inject under the skin    fluticasone-salmeterol (ADVAIR DISKUS) 500-50 mcg/dose Inhale     No current facility-administered medications on file prior to visit  She is allergic to cat hair extract and pollen extract       Review of Systems   Constitutional: Positive for fatigue  Negative for fever and unexpected weight change  HENT: Negative for dental problem, mouth sores, nosebleeds, rhinorrhea, sinus pain, sinus pressure and sore throat  Eyes: Negative for pain, discharge and visual disturbance  Respiratory: Negative for cough, chest tightness, shortness of breath and wheezing  Cardiovascular: Negative for chest pain, palpitations and leg swelling  Gastrointestinal: Negative for blood in stool, constipation, diarrhea, nausea and vomiting  Endocrine: Negative for polydipsia     Genitourinary: Negative for difficulty urinating, dyspareunia, dysuria, menstrual problem, pelvic pain, urgency, vaginal discharge and vaginal pain  Musculoskeletal: Negative for arthralgias, back pain and joint swelling  Allergic/Immunologic: Negative for environmental allergies  Neurological: Negative for seizures, light-headedness and headaches  Hematological: Does not bruise/bleed easily  Psychiatric/Behavioral: Negative for sleep disturbance  The patient is not nervous/anxious  Depression         Objective:             Physical Exam   Constitutional: She is oriented to person, place, and time  She appears well-developed and well-nourished  No distress  Young white female   Cardiovascular: Normal rate, regular rhythm and normal heart sounds  No murmur heard  Pulmonary/Chest: Effort normal and breath sounds normal  No respiratory distress  She has no wheezes  She has no rales  Neurological: She is alert and oriented to person, place, and time

## 2018-03-23 NOTE — DISCHARGE INSTRUCTIONS
Loop Electrosurgical Excision Procedure   WHAT YOU NEED TO KNOW:   A loop electrosurgical excision procedure (LEEP) is used to remove abnormal tissue from your cervix or vagina  Your cervix is the opening of your uterus  Your healthcare provider will use a small wire loop that is heated by an electrical current to remove the tissue  DISCHARGE INSTRUCTIONS:   Medicines: You may need any of the following:  · Acetaminophen  decreases pain  It is available without a doctor's order  Ask how much to take and how often to take it  Follow directions  Acetaminophen can cause liver damage if not taken correctly  · NSAIDs  decrease pain and swelling  This medicine is available without a doctor's order  This medicine can cause stomach bleeding or kidney problems  If you take blood thinner medicine, always ask your healthcare provider if NSAIDs are safe for you  Always read the medicine label and follow the directions on it before you use this medicine  · Take your medicine as directed  Contact your healthcare provider if you think your medicine is not helping or if you have side effects  Tell him if you are allergic to any medicine  Keep a list of the medicines, vitamins, and herbs you take  Include the amounts, and when and why you take them  Bring the list or the pill bottles to follow-up visits  Carry your medicine list with you in case of an emergency  Follow up with your healthcare provider or gynecologist as directed:  Write down your questions so you remember to ask them during your visits  Rest:  Rest when you feel it is needed  Slowly start to do more each day  Return to your daily activities as directed  Vaginal care: It is normal to have mild cramping, spotting, or discharge for several days after your procedure  You may also have a thin, watery discharge for up to 4 weeks after your procedure  Use a clean sanitary pad as needed   Do not  use tampons, douche, or have sex until your healthcare provider or gynecologist says that it is okay  Bathing:  Do not take a bath or use a hot tub for 2 weeks after your procedure, or as directed by your healthcare provider or gynecologist  Licha Christensen may shower during this time  Contact your healthcare provider or gynecologist if:   · You have a fever or chills  · You have nausea or are vomiting  · You have blood in your urine  · Your pad becomes soaked with blood  · You have foul-smelling drainage from your vagina  · You have pain when you urinate or have sex  · You have questions or concerns about your condition or care  Seek care immediately or call 911 if:   · You have heavy bleeding from your vagina  · You have severe abdominal or vaginal pain that does not go away, even after you take pain medicine  · You are urinating less than before your procedure  © 2016 [employer] is for End User's use only and may not be sold, redistributed or otherwise used for commercial purposes  All illustrations and images included in CareNotes® are the copyrighted property of [employer] , [employer]  or Zaid Schaefer  The above information is an  only  It is not intended as medical advice for individual conditions or treatments  Talk to your doctor, nurse or pharmacist before following any medical regimen to see if it is safe and effective for you

## 2018-04-05 ENCOUNTER — OFFICE VISIT (OUTPATIENT)
Dept: OBGYN CLINIC | Facility: CLINIC | Age: 33
End: 2018-04-05

## 2018-04-05 VITALS
HEIGHT: 68 IN | SYSTOLIC BLOOD PRESSURE: 120 MMHG | BODY MASS INDEX: 26.98 KG/M2 | DIASTOLIC BLOOD PRESSURE: 60 MMHG | WEIGHT: 178 LBS

## 2018-04-05 DIAGNOSIS — Z98.890 S/P LEEP OF CERVIX: ICD-10-CM

## 2018-04-05 DIAGNOSIS — N87.1 MODERATE DYSPLASIA OF CERVIX (CIN II): Primary | ICD-10-CM

## 2018-04-05 PROBLEM — R87.610 ASCUS WITH POSITIVE HIGH RISK HPV CERVICAL: Status: RESOLVED | Noted: 2018-03-01 | Resolved: 2018-04-05

## 2018-04-05 PROBLEM — R87.611 PAP SMEAR OF CERVIX WITH ASCUS, CANNOT EXCLUDE HGSIL: Status: RESOLVED | Noted: 2018-03-01 | Resolved: 2018-04-05

## 2018-04-05 PROBLEM — R87.810 ASCUS WITH POSITIVE HIGH RISK HPV CERVICAL: Status: RESOLVED | Noted: 2018-03-01 | Resolved: 2018-04-05

## 2018-04-05 PROCEDURE — 99024 POSTOP FOLLOW-UP VISIT: CPT | Performed by: OBSTETRICS & GYNECOLOGY

## 2018-04-05 NOTE — PROGRESS NOTES
Assessment/Plan:    Normal  Postop check  - s/p leep - cleared for normal activity  Reviewed pathology  RTO  1 year for pap and cotesting       Problem List Items Addressed This Visit     Moderate dysplasia of cervix (ERNIE II) - Primary    S/P LEEP of cervix            Subjective:      Patient ID: Julita Welsh is a 29 y o  female  Here for postop exam - did well s/p LEEP - reviewed pathology and follow up -         The following portions of the patient's history were reviewed and updated as appropriate: allergies, current medications, past family history, past medical history, past social history, past surgical history and problem list     Review of Systems   Constitutional: Negative for fatigue, fever and unexpected weight change  HENT: Negative for dental problem, mouth sores, nosebleeds, rhinorrhea, sinus pain, sinus pressure and sore throat  Eyes: Negative for pain, discharge and visual disturbance  Respiratory: Negative for cough, chest tightness, shortness of breath and wheezing  Cardiovascular: Negative for chest pain, palpitations and leg swelling  Gastrointestinal: Negative for blood in stool, constipation, diarrhea, nausea and vomiting  Endocrine: Negative for polydipsia  Genitourinary: Negative for difficulty urinating, dyspareunia, dysuria, menstrual problem, pelvic pain, urgency, vaginal discharge and vaginal pain  Musculoskeletal: Negative for arthralgias, back pain and joint swelling  Allergic/Immunologic: Negative for environmental allergies  Neurological: Negative for seizures, light-headedness and headaches  Hematological: Does not bruise/bleed easily  Psychiatric/Behavioral: Negative for sleep disturbance  The patient is not nervous/anxious  Objective:      LMP 03/15/2018 (Exact Date)          Physical Exam   Constitutional: She is oriented to person, place, and time  She appears well-developed and well-nourished  No distress     Young white female   HENT: Head: Normocephalic and atraumatic  Genitourinary:   Genitourinary Comments: Normal vulvovaginal area  Cervix healing well - still a small area of eshar  - no bleeding   Neurological: She is alert and oriented to person, place, and time  Psychiatric: She has a normal mood and affect   Her behavior is normal

## 2018-05-21 ENCOUNTER — TELEPHONE (OUTPATIENT)
Dept: OBGYN CLINIC | Facility: CLINIC | Age: 33
End: 2018-05-21

## 2018-05-22 NOTE — TELEPHONE ENCOUNTER
Reviewed this could be normal, could be her body some time to start cycling again since having the IUD  she did take neg UPT     She is going to call back next month at this time if she is still not menstruating

## 2018-08-13 ENCOUNTER — TELEPHONE (OUTPATIENT)
Dept: OBGYN CLINIC | Facility: CLINIC | Age: 33
End: 2018-08-13

## 2018-08-16 ENCOUNTER — APPOINTMENT (OUTPATIENT)
Dept: LAB | Facility: CLINIC | Age: 33
End: 2018-08-16
Payer: COMMERCIAL

## 2018-08-16 ENCOUNTER — OFFICE VISIT (OUTPATIENT)
Dept: OBGYN CLINIC | Facility: CLINIC | Age: 33
End: 2018-08-16
Payer: COMMERCIAL

## 2018-08-16 VITALS
DIASTOLIC BLOOD PRESSURE: 64 MMHG | BODY MASS INDEX: 25.46 KG/M2 | SYSTOLIC BLOOD PRESSURE: 112 MMHG | WEIGHT: 168 LBS | HEIGHT: 68 IN

## 2018-08-16 DIAGNOSIS — N92.6 IRREGULAR MENSTRUAL CYCLE: Primary | ICD-10-CM

## 2018-08-16 DIAGNOSIS — N92.6 IRREGULAR MENSTRUAL CYCLE: ICD-10-CM

## 2018-08-16 PROBLEM — Z30.46 NEXPLANON REMOVAL: Status: RESOLVED | Noted: 2018-03-14 | Resolved: 2018-08-16

## 2018-08-16 LAB
B-HCG SERPL-ACNC: <2 MIU/ML
FSH SERPL-ACNC: 5.5 MIU/ML
LH SERPL-ACNC: 3.3 MIU/ML
PROLACTIN SERPL-MCNC: 5.9 NG/ML
T4 FREE SERPL-MCNC: 0.85 NG/DL (ref 0.76–1.46)
TSH SERPL DL<=0.05 MIU/L-ACNC: 1.04 UIU/ML (ref 0.36–3.74)

## 2018-08-16 PROCEDURE — 99214 OFFICE O/P EST MOD 30 MIN: CPT | Performed by: PHYSICIAN ASSISTANT

## 2018-08-16 PROCEDURE — 36415 COLL VENOUS BLD VENIPUNCTURE: CPT

## 2018-08-16 PROCEDURE — 83001 ASSAY OF GONADOTROPIN (FSH): CPT

## 2018-08-16 PROCEDURE — 83002 ASSAY OF GONADOTROPIN (LH): CPT

## 2018-08-16 PROCEDURE — 84443 ASSAY THYROID STIM HORMONE: CPT

## 2018-08-16 PROCEDURE — 84439 ASSAY OF FREE THYROXINE: CPT

## 2018-08-16 PROCEDURE — 84146 ASSAY OF PROLACTIN: CPT

## 2018-08-16 PROCEDURE — 84702 CHORIONIC GONADOTROPIN TEST: CPT

## 2018-08-16 RX ORDER — ESCITALOPRAM OXALATE 10 MG/1
TABLET ORAL
COMMUNITY
Start: 2018-08-12 | End: 2018-11-08

## 2018-08-16 RX ORDER — ALPRAZOLAM 0.5 MG/1
TABLET ORAL
COMMUNITY
Start: 2018-08-12 | End: 2019-02-14

## 2018-08-16 NOTE — PROGRESS NOTES
Assessment/Plan   Diagnoses and all orders for this visit:    Irregular menstrual cycle  -     Follicle stimulating hormone; Future  -     hCG, quantitative; Future  -     Luteinizing hormone; Future  -     Prolactin; Future  -     T4, free; Future  -     TSH, 3rd generation; Future    Discussion  Reassured patient may take some time for period to return to normal after removal of Nexplanon  Also, unknown what "normal" menstrual cycle is for her since on hormones since start of menses  Slip for BW given to verify WNL and reassure patient  Will plan to watch cycles and patient to call or RTO with any concerning signs/symptoms  Will plan to call with BW results to review with patient  RTO for APE when due or sooner if needed    Subjective     HPI   Derik Whipple is a 34 y o  female who presents for irregular periods  Nexplanon removed 3/14/18 and planned vasectomy for  at that time which he had done about a month ago, but still needs follow-up check  Shortly after nexplanon removed, patient had a LEEP done by Dr Aidee Sheth - per patient no period x 2 months - had taken a UPT at that time which was negative; Then, period returned June and July at a regular one month interval, however, both periods lasted for 1 day of regular flow  Patient has been on [facility] since the start of periods essentially so unknown what periods are like off hormones  LMP - 7/2018; Periods are usually reg q month and lasting 1 day; No excessive bleeding; No intermenstrual bleeding or spotting; Cramps are tolerable with Midol and usually occur the week prior to period  No menopausal symptoms: No hot flashes/night sweats, problems with intercourse, vaginal dryness; sleeping well  No abd/pelvic pain or HAs;   Pt is sexually active in a mutually monog/ sexual relationship; No issues with intercourse;  She declines std/hiv/hep testing; Feels safe at home  Current contraception:  vasectomy and needs follow-up check  Condom use: yes    Last Pap - 2/2018; patient status post LEEP done 3/2018 and patient to RTO for APE at time due for follow-up pap and HPV cotesting  History of abnormal Pap smear: yes    Review of Systems   Constitutional: Negative for activity change, fatigue, fever and unexpected weight change  HENT: Negative for congestion, dental problem, sinus pain and sinus pressure  Eyes: Negative for visual disturbance  Respiratory: Negative for cough, shortness of breath and wheezing  Cardiovascular: Negative for chest pain and leg swelling  Gastrointestinal: Negative for abdominal distention, abdominal pain, blood in stool, constipation, diarrhea, nausea and vomiting  Endocrine: Negative for polydipsia  Genitourinary: Positive for menstrual problem (as noted in HPI)  Negative for difficulty urinating, dyspareunia, dysuria, frequency, hematuria, pelvic pain, urgency, vaginal bleeding, vaginal discharge and vaginal pain  Musculoskeletal: Negative for arthralgias and back pain  Allergic/Immunologic: Negative for environmental allergies  Neurological: Negative for dizziness, seizures and headaches  Psychiatric/Behavioral: Negative for dysphoric mood and sleep disturbance  The patient is not nervous/anxious          The following portions of the patient's history were reviewed and updated as appropriate: allergies, current medications, past family history, past medical history, past social history, past surgical history and problem list          Past Medical History:   Diagnosis Date    Anxiety     Asthma     Cervical lesion     Degeneration, intervertebral disc, lumbar     Depression        Past Surgical History:   Procedure Laterality Date    CERVICAL BIOPSY  W/ LOOP ELECTRODE EXCISION  03/2018    [facility] N/A 3/23/2018    Procedure: BIOPSY LEEP CERVIX;  Surgeon: Vicky Warren MD;  [facility];  Service: Gynecology    RHINOPLASTY     [address] [place] [address]     anchor inserted    TOOTH EXTRACTION         Family History   Problem Relation Age of Onset    Cancer Mother     Melanoma Mother     Diabetes Father     Hypertension Father     Hyperlipidemia Brother     Ovarian cancer Paternal Grandmother     Cancer Paternal Uncle     Diabetes Family     Hypertension Family        Social History     Social History    Marital status: /Civil Union     Spouse name: N/A    Number of children: N/A    Years of education: N/A     Occupational History    Not on file  Social History Main Topics    Smoking status: Never Smoker    Smokeless tobacco: Never Used    Alcohol use 0 6 oz/week     1 Glasses of wine per week    Drug use: No    Sexual activity: Yes     Partners: Male     Birth control/ protection: None     Other Topics Concern    Not on file     Social History Narrative    No narrative on file         Current Outpatient Prescriptions:     ALPRAZolam (XANAX) 0 5 mg tablet, , Disp: , Rfl:     budesonide-formoterol (SYMBICORT) 160-4 5 mcg/act inhaler, Inhale 2 puffs daily, Disp: , Rfl:     escitalopram (LEXAPRO) 10 mg tablet, , Disp: , Rfl:     Multiple Vitamin (MULTIVITAMIN) tablet, Take 1 tablet by mouth daily, Disp: , Rfl:     Probiotic Product (PROBIOTIC PO), Take by mouth, Disp: , Rfl:     Turmeric 500 MG TABS, Take by mouth, Disp: , Rfl:     albuterol (VENTOLIN HFA) 90 mcg/act inhaler, Inhale 2 puffs 4 (four) times a day  , Disp: , Rfl:     Allergies   Allergen Reactions    Cat Hair Extract Allergic Rhinitis    Pollen Extract Allergic Rhinitis       Objective   Vitals:    08/16/18 1058   BP: 112/64   BP Location: Left arm   Cuff Size: Standard   Weight: 76 2 kg (168 lb)   Height: 5' 8" (1 727 m)     Physical Exam   Constitutional: She appears well-developed and well-nourished  No distress  Abdominal: Soft  She exhibits no distension and no mass  There is no tenderness     Genitourinary: Vagina normal and uterus normal  Pelvic exam was performed with patient supine  There is no rash, tenderness or lesion on the right labia  There is no rash, tenderness or lesion on the left labia  Uterus is not deviated, not enlarged, not fixed and not tender  Cervix exhibits no motion tenderness, no discharge and no friability  Right adnexum displays no mass, no tenderness and no fullness  Left adnexum displays no mass, no tenderness and no fullness  No erythema, tenderness or bleeding in the vagina  No foreign body in the vagina  No vaginal discharge found  Lymphadenopathy:        Right: No inguinal adenopathy present  Left: No inguinal adenopathy present  Neurological: She is alert  Skin: Skin is warm  She is not diaphoretic  Psychiatric: She has a normal mood and affect  Her behavior is normal    Vitals reviewed

## 2018-11-08 ENCOUNTER — OFFICE VISIT (OUTPATIENT)
Dept: OBGYN CLINIC | Facility: CLINIC | Age: 33
End: 2018-11-08
Payer: COMMERCIAL

## 2018-11-08 VITALS
WEIGHT: 173 LBS | SYSTOLIC BLOOD PRESSURE: 104 MMHG | HEIGHT: 68 IN | BODY MASS INDEX: 26.22 KG/M2 | DIASTOLIC BLOOD PRESSURE: 62 MMHG

## 2018-11-08 DIAGNOSIS — Z30.09 COUNSELING FOR INITIATION OF BIRTH CONTROL METHOD: Primary | ICD-10-CM

## 2018-11-08 PROCEDURE — 99213 OFFICE O/P EST LOW 20 MIN: CPT | Performed by: PHYSICIAN ASSISTANT

## 2018-11-08 RX ORDER — NORETHINDRONE ACETATE AND ETHINYL ESTRADIOL 1MG-20(21)
1 KIT ORAL DAILY
Qty: 28 TABLET | Refills: 3 | Status: SHIPPED | OUTPATIENT
Start: 2018-11-08 | End: 2019-02-14 | Stop reason: SDUPTHER

## 2018-11-08 NOTE — PROGRESS NOTES
Assessment/Plan   Diagnoses and all orders for this visit:    Counseling for initiation of birth control method  -     norethindrone-ethinyl estradiol (JUNEL FE 1/20) 1-20 MG-MCG per tablet; Take 1 tablet by mouth daily    Discussion  Patient desire re-insertion of a nexplanon, but was told cannot get another one until next year  Desires OCP until can get Nexplanon placed:   1)  Begin the pill with the start of your next period - reviewed Sunday rule start, starting with the first pill in the packet  Take it the same time daily, within the same hour time frame (such as between 8 and 9am)  2) It common to experience some irregular bleeding when newly starting the pill  This should resolve after 3 months of use  Also minor side effects such as breast tenderness, minor headaches and nausea may occur, but typically resolve after continuing on the pill for at least 3 months  3)  Call if you experience severe headaches, visual disturbances, chest pain, shortness of breath, abdominal pain, pain tingling or weakness in arms or legs  4) Advise a back-up method, like condoms, during the first month on the OCP, if misses any pills, or is put on antibiotics  Reviewed missed pill protocol;   5) Advise safe sexual practices and the importance of condoms to prevent the transmission of STDs  6) Return visit in 3 months for pill & blood pressure check  At that point in time, patient will be due for her APE so will plan full APE with follow-up pap smear  All questions have been answered to her satisfaction  Patient agrees with plan  Subjective     HPI   Chetan Coffey is a 34 y o  female who presents for a birth control discussion  LMP - 10/26/18; Periods are reg q month and last 2 days - very light flow; No excessive bleeding; No intermenstrual bleeding or spotting; Cramps are tolerable  No abd/pelvic pain or HAs;    History is negative for liver, gallbladder or thromboembolic disease or migraine headaches with aura    Pt is not currently sexually active - she just recently  from her  so desires to restart [facility] if needed - denies any new partners at this time; No issues with intercourse; She declines std/hiv/hep testing; Feels safe at home  Current contraception: none    Last Pap - 18 - ASC-H (+) type 16; (-) type 18 and other HRHPV; 3/2018 - colpo ECC negative and cervical biopsy HSIL ERNIE 2/3; 3/3018 - LEEP done  History of abnormal Pap smear: yes    Review of Systems   Constitutional: Negative for fatigue and unexpected weight change  Eyes: Negative for photophobia and visual disturbance  Respiratory: Negative for shortness of breath  Cardiovascular: Negative for chest pain and leg swelling  Gastrointestinal: Negative for abdominal distention, abdominal pain, constipation, diarrhea, nausea and vomiting  Genitourinary: Negative for menstrual problem, pelvic pain and vaginal bleeding  Neurological: Negative for headaches  Psychiatric/Behavioral: Negative for dysphoric mood  The patient is not nervous/anxious          The following portions of the patient's history were reviewed and updated as appropriate: allergies, current medications, past family history, past medical history, past social history, past surgical history and problem list          OB History      Para Term  AB Living    0 0 0 0 0 0    SAB TAB Ectopic Multiple Live Births    0 0 0 0 0          Past Medical History:   Diagnosis Date    Abnormal Pap smear of cervix 2018    ASC-H    Anxiety     Asthma     Cervical lesion     Degeneration, intervertebral disc, lumbar     Depression     HPV (human papilloma virus) infection 2018    (+) type 16; negative type 18 and other HRHPV       Past Surgical History:   Procedure Laterality Date    CERVICAL BIOPSY  W/ LOOP ELECTRODE EXCISION  2018    [facility] N/A 3/23/2018    Procedure: BIOPSY LEEP CERVIX;  Surgeon: Aura Carmichael MD;  [facility];  Service: Gynecology    RHINOPLASTY      SHOULDER SURGERY Right     anchor inserted    TOOTH EXTRACTION         Family History   Problem Relation Age of Onset    Cancer Mother     Melanoma Mother     Diabetes Father     Hypertension Father     Hyperlipidemia Brother     Ovarian cancer Paternal Grandmother     Cancer Paternal Uncle     Diabetes Family     Hypertension Family        Social History     Social History    Marital status: /Civil Union     Spouse name: N/A    Number of children: N/A    Years of education: N/A     Occupational History    Not on file       Social History Main Topics    Smoking status: Never Smoker    Smokeless tobacco: Never Used    Alcohol use 0 6 oz/week     1 Glasses of wine per week    Drug use: No    Sexual activity: Yes     Partners: Male     Birth control/ protection: None     Other Topics Concern    Not on file     Social History Narrative    No narrative on file         Current Outpatient Prescriptions:     albuterol (VENTOLIN HFA) 90 mcg/act inhaler, Inhale 2 puffs 4 (four) times a day  , Disp: , Rfl:     budesonide-formoterol (SYMBICORT) 160-4 5 mcg/act inhaler, Inhale 2 puffs daily, Disp: , Rfl:     Multiple Vitamin (MULTIVITAMIN) tablet, Take 1 tablet by mouth daily, Disp: , Rfl:     Probiotic Product (PROBIOTIC PO), Take by mouth, Disp: , Rfl:     Turmeric 500 MG TABS, Take by mouth, Disp: , Rfl:     ALPRAZolam (XANAX) 0 5 mg tablet, , Disp: , Rfl:     norethindrone-ethinyl estradiol (JUNEL FE 1/20) 1-20 MG-MCG per tablet, Take 1 tablet by mouth daily, Disp: 28 tablet, Rfl: 3    Allergies   Allergen Reactions    Cat Hair Extract Allergic Rhinitis    Pollen Extract Allergic Rhinitis       Objective   Vitals:    11/08/18 0804   BP: 104/62   BP Location: Left arm   Patient Position: Sitting   Cuff Size: Adult   Weight: 78 5 kg (173 lb)   Height: 5' 8" (1 727 m)     Physical Exam   Constitutional: She appears well-developed and well-nourished  No distress  Skin: She is not diaphoretic  Psychiatric: She has a normal mood and affect  Her behavior is normal    Vitals reviewed

## 2018-11-15 ENCOUNTER — OFFICE VISIT (OUTPATIENT)
Dept: OBGYN CLINIC | Facility: CLINIC | Age: 33
End: 2018-11-15
Payer: COMMERCIAL

## 2018-11-15 ENCOUNTER — APPOINTMENT (OUTPATIENT)
Dept: LAB | Facility: CLINIC | Age: 33
End: 2018-11-15
Payer: COMMERCIAL

## 2018-11-15 VITALS
DIASTOLIC BLOOD PRESSURE: 66 MMHG | HEIGHT: 68 IN | BODY MASS INDEX: 26.52 KG/M2 | SYSTOLIC BLOOD PRESSURE: 118 MMHG | WEIGHT: 175 LBS

## 2018-11-15 DIAGNOSIS — Z11.3 SCREENING FOR STD (SEXUALLY TRANSMITTED DISEASE): ICD-10-CM

## 2018-11-15 DIAGNOSIS — Z72.51 UNPROTECTED SEXUAL INTERCOURSE: ICD-10-CM

## 2018-11-15 DIAGNOSIS — N89.8 VAGINAL DISCHARGE: Primary | ICD-10-CM

## 2018-11-15 LAB
HBV SURFACE AG SER QL: NORMAL
HCV AB SER QL: NORMAL
RPR SER QL: NORMAL
SL AMB POCT WET MOUNT: NORMAL

## 2018-11-15 PROCEDURE — 86803 HEPATITIS C AB TEST: CPT

## 2018-11-15 PROCEDURE — 87210 SMEAR WET MOUNT SALINE/INK: CPT | Performed by: PHYSICIAN ASSISTANT

## 2018-11-15 PROCEDURE — 36415 COLL VENOUS BLD VENIPUNCTURE: CPT

## 2018-11-15 PROCEDURE — 87340 HEPATITIS B SURFACE AG IA: CPT

## 2018-11-15 PROCEDURE — 86592 SYPHILIS TEST NON-TREP QUAL: CPT

## 2018-11-15 PROCEDURE — 87389 HIV-1 AG W/HIV-1&-2 AB AG IA: CPT

## 2018-11-15 PROCEDURE — 99214 OFFICE O/P EST MOD 30 MIN: CPT | Performed by: PHYSICIAN ASSISTANT

## 2018-11-15 NOTE — PROGRESS NOTES
Assessment/Plan   Diagnoses and all orders for this visit:    Vaginal discharge  -     POCT wet mount  -     GP Chlamydia + Gonorrhea, Liquid-Based  -     GP Trichomonas By Multiplex PCR  -     GP Culture, Genital    Screening for STD (sexually transmitted disease)  -     Hepatitis B surface antigen; Future  -     Hepatitis C antibody; Future  -     HIV 1/2 AG-AB combo; Future  -     RPR; Future  -     POCT wet mount  -     GP Chlamydia + Gonorrhea, Liquid-Based  -     GP Trichomonas By Multiplex PCR  -     GP Culture, Genital    Unprotected sexual intercourse  -     Hepatitis B surface antigen; Future  -     Hepatitis C antibody; Future  -     HIV 1/2 AG-AB combo; Future  -     RPR; Future  -     POCT wet mount  -     GP Chlamydia + Gonorrhea, Liquid-Based  -     GP Trichomonas By Multiplex PCR  -     GP Culture, Genital      Discussion  Reviewed physical exam findings and normal wet mount  Cultures collected and will treat based on results  Slip given for STD BW  Encourage probiotic tablet like acidophilus BID with sx and then qd for maintenance; can also increase yogurt in diet; Encourage safe sexual practices  Plan on starting OCP with next period  All questions answered at this time  If patient symptoms persist, worsen, or new symptoms present - she is to call or RTO  RTO for [facility] when due or sooner if needed    Subjective     HPI   Edie Capps is a 34 y o  female who presents for possible infection  For the past couple days patient has noticed an increase and change in vaginal discharge - started off green, now not green anymore, but still increased in amount; no unusual odor  Pelvic cramping for the past couple days throughout pelvis as well - period due next week  Denies any bowel issues  No vulvar itch/burn; No vaginal itch/burn; No urinary sx - burning/pain/frequency/hematuria  No abd pain; No fever/chills  LMP - 10/26/18; Periods are reg q month and last 1-2 days;  Waiting to start OCP prescribed at [facility] last week once period comes  Pt is sexually active in a new sexual relationship x 1-2 weeks now - has not used condoms; Requests std cultures; Requests hiv/hep testing; Feels safe at home  Current contraception: none - plans to start OCP with next period  Condom use: no    Last Pap - 2/9/18 - ASC-H (+) type 16; (-) type 18 and other HRHPV; 3/2018 - colpo ECC negative and cervical biopsy [facility] ERNIE 2/3; 3/3018 - LEEP done  History of abnormal Pap smear: yes    Review of Systems   Constitutional: Negative for chills, fatigue, fever and unexpected weight change  Respiratory: Negative for shortness of breath  Cardiovascular: Negative for chest pain and leg swelling  Gastrointestinal: Negative for abdominal distention, abdominal pain, constipation, diarrhea, nausea and vomiting  Genitourinary: Positive for pelvic pain (as noted in HPI) and vaginal discharge (as noted in HPI)  Negative for difficulty urinating, dyspareunia, dysuria, flank pain, frequency, genital sores, hematuria, menstrual problem, urgency, vaginal bleeding and vaginal pain  Neurological: Negative for headaches  Psychiatric/Behavioral: Negative for dysphoric mood  The patient is not nervous/anxious          The following portions of the patient's history were reviewed and updated as appropriate: allergies, current medications, past family history, past medical history, past social history, past surgical history and problem list          Past Medical History:   Diagnosis Date    Abnormal Pap smear of cervix 02/2018    ASC-H    Anxiety     Asthma     Cervical lesion     Degeneration, intervertebral disc, lumbar     Depression     HPV (human papilloma virus) infection 02/2018    (+) type 16; negative type 18 and other HRHPV       Past Surgical History:   Procedure Laterality Date    CERVICAL BIOPSY  W/ LOOP ELECTRODE EXCISION  03/2018    [facility] N/A 3/23/2018    Procedure: BIOPSY LEEP CERVIX;  Surgeon: Xavier Gill MD;  Location: [facility];  Service: Gynecology    RHINOPLASTY      SHOULDER SURGERY Right     anchor inserted    TOOTH EXTRACTION         Family History   Problem Relation Age of Onset    Cancer Mother     Melanoma Mother     Diabetes Father     Hypertension Father     Hyperlipidemia Brother     Ovarian cancer Paternal Grandmother     Cancer Paternal Uncle     Diabetes Family     Hypertension Family        Social History     Social History    Marital status: /Civil Union     Spouse name: N/A    Number of children: N/A    Years of education: N/A     Occupational History    Not on file       Social History Main Topics    Smoking status: Never Smoker    Smokeless tobacco: Never Used    Alcohol use 0 6 oz/week     1 Glasses of wine per week    Drug use: No    Sexual activity: Yes     Partners: Male     Birth control/ protection: None     Other Topics Concern    Not on file     Social History Narrative    No narrative on file         Current Outpatient Prescriptions:     albuterol (VENTOLIN HFA) 90 mcg/act inhaler, Inhale 2 puffs 4 (four) times a day  , Disp: , Rfl:     ALPRAZolam (XANAX) 0 5 mg tablet, , Disp: , Rfl:     budesonide-formoterol (SYMBICORT) 160-4 5 mcg/act inhaler, Inhale 2 puffs daily, Disp: , Rfl:     Multiple Vitamin (MULTIVITAMIN) tablet, Take 1 tablet by mouth daily, Disp: , Rfl:     Probiotic Product (PROBIOTIC PO), Take by mouth, Disp: , Rfl:     Turmeric 500 MG TABS, Take by mouth, Disp: , Rfl:     norethindrone-ethinyl estradiol (JUNEL FE 1/20) 1-20 MG-MCG per tablet, Take 1 tablet by mouth daily, Disp: 28 tablet, Rfl: 3    Allergies   Allergen Reactions    Cat Hair Extract Allergic Rhinitis    Pollen Extract Allergic Rhinitis       Objective   Vitals:    11/15/18 0909   BP: 118/66   BP Location: Left arm   Patient Position: Sitting   Cuff Size: Adult   Weight: 79 4 kg (175 lb)   Height: 5' 8" (1 727 m)     Physical Exam Constitutional: She appears well-developed and well-nourished  No distress  Abdominal: Soft  She exhibits no distension and no mass  There is no tenderness  There is no CVA tenderness  Genitourinary: Uterus normal  Pelvic exam was performed with patient supine  There is no rash, tenderness or lesion on the right labia  There is no rash, tenderness or lesion on the left labia  Uterus is not deviated, not enlarged, not fixed and not tender  Cervix exhibits no motion tenderness, no discharge and no friability  Right adnexum displays no mass, no tenderness and no fullness  Left adnexum displays no mass, no tenderness and no fullness  No erythema, tenderness or bleeding in the vagina  No foreign body in the vagina  Vaginal discharge (thick/sticky off white/yellow discharge in moderate amount) found  Lymphadenopathy:        Right: No inguinal adenopathy present  Left: No inguinal adenopathy present  Neurological: She is alert  Skin: Skin is warm  She is not diaphoretic  Psychiatric: She has a normal mood and affect  Her behavior is normal    Vitals reviewed

## 2018-11-16 LAB — HIV 1+2 AB+HIV1 P24 AG SERPL QL IA: NORMAL

## 2018-11-19 LAB
DEPRECATED C TRACH RRNA XXX QL PRB: DETECTED
N GONORRHOEA DNA UR QL NAA+PROBE: NOT DETECTED
SL AMB GENITAL CULTURE: NORMAL
T VAGINALIS RRNA SPEC QL NAA+PROBE: NOT DETECTED

## 2018-11-20 DIAGNOSIS — A74.9 CHLAMYDIA: Primary | ICD-10-CM

## 2018-11-20 DIAGNOSIS — A74.9 CHLAMYDIA INFECTION: Primary | ICD-10-CM

## 2018-11-20 RX ORDER — AZITHROMYCIN 500 MG/1
1000 TABLET, FILM COATED ORAL DAILY
Qty: 2 TABLET | Refills: 0 | Status: SHIPPED | OUTPATIENT
Start: 2018-11-20 | End: 2018-11-21

## 2018-11-20 RX ORDER — AZITHROMYCIN 500 MG/1
1000 TABLET, FILM COATED ORAL ONCE
Qty: 2 TABLET | Refills: 0 | Status: SHIPPED | OUTPATIENT
Start: 2018-11-20 | End: 2018-11-20

## 2019-02-14 ENCOUNTER — ANNUAL EXAM (OUTPATIENT)
Dept: OBGYN CLINIC | Facility: CLINIC | Age: 34
End: 2019-02-14
Payer: COMMERCIAL

## 2019-02-14 VITALS
DIASTOLIC BLOOD PRESSURE: 72 MMHG | SYSTOLIC BLOOD PRESSURE: 108 MMHG | HEIGHT: 68 IN | BODY MASS INDEX: 25.61 KG/M2 | WEIGHT: 169 LBS

## 2019-02-14 DIAGNOSIS — Z86.19 HISTORY OF CHLAMYDIA: ICD-10-CM

## 2019-02-14 DIAGNOSIS — Z30.09 COUNSELING FOR INITIATION OF BIRTH CONTROL METHOD: ICD-10-CM

## 2019-02-14 DIAGNOSIS — Z11.3 SCREENING FOR STD (SEXUALLY TRANSMITTED DISEASE): ICD-10-CM

## 2019-02-14 DIAGNOSIS — Z72.51 UNPROTECTED SEXUAL INTERCOURSE: ICD-10-CM

## 2019-02-14 DIAGNOSIS — Z01.419 ENCOUNTER FOR GYNECOLOGICAL EXAMINATION (GENERAL) (ROUTINE) WITHOUT ABNORMAL FINDINGS: Primary | ICD-10-CM

## 2019-02-14 PROCEDURE — S0612 ANNUAL GYNECOLOGICAL EXAMINA: HCPCS | Performed by: PHYSICIAN ASSISTANT

## 2019-02-14 RX ORDER — NORETHINDRONE ACETATE AND ETHINYL ESTRADIOL 1MG-20(21)
1 KIT ORAL DAILY
Qty: 84 TABLET | Refills: 3 | Status: SHIPPED | OUTPATIENT
Start: 2019-02-14 | End: 2019-06-21 | Stop reason: HOSPADM

## 2019-02-14 RX ORDER — ESCITALOPRAM OXALATE 20 MG/1
20 TABLET ORAL DAILY
Refills: 1 | COMMUNITY
Start: 2019-01-21 | End: 2019-05-15 | Stop reason: ALTCHOICE

## 2019-02-14 NOTE — PROGRESS NOTES
Assessment/Plan   Diagnoses and all orders for this visit:    Encounter for gynecological examination (general) (routine) without abnormal findings  -     GP Liquid-Based Pap + HPV Plus  The current ASCCP guidelines were reviewed  Patient's last pap was 2/9/18 - ASC-H (+) type 16; (-) type 18 and other HRHPV and therefore, a pap with HPV cotesting is indicated at this time  I emphasized the importance of an annual pelvic and breast exam  Patient ok to have a pap done today  Screening for STD (sexually transmitted disease)  -     Hepatitis B surface antigen; Future  -     Hepatitis C antibody; Future  -     HIV 1/2 AG-AB combo; Future  -     RPR; Future  -     GP Chlamydia, Liquid-Based  History of chlamydia  -     GP Chlamydia, Liquid-Based  Unprotected sexual intercourse  -     Hepatitis B surface antigen; Future  -     Hepatitis C antibody; Future  -     HIV 1/2 AG-AB combo; Future  -     RPR; Future  -     GP Chlamydia, Liquid-Based  Encourage safe sexual practices; STD testing - TEJAS for chlamydia performed today  Slip to have repeat STD BW done at 6 months so around 5/15/2019;     Counseling for initiation of birth control method  -     norethindrone-ethinyl estradiol (JUNEL FE 1/20) 1-20 MG-MCG per tablet; Take 1 tablet by mouth daily  Offered alternate OCP to see if alleviates low sex drive  Can also consider nexplanon again if worked well in the past  At this time, she will continue with Junel Fe 1/20 1 tab po daily  Patient to call with further questions/concerns or desire to change  Discussion  I have discussed the importance of monthly self-breast exams, exercise and healthy diet as well as adequate intake of calcium and vitamin D  Encourage MVI q day and r/mare importance of folic acid;  Encourage 30-40 min weight bearing exercise most days of week  Breast cancer screening is not indicated at this time  Reviewed enlarged thyroid - normal thyroid studies in 8/2018 and normal thyroid ultrasound approximately 1 year ago with nontoxic goiter noted and no thyroid nodules of greater than 1 cm B/L  Can plan yearly thyroid studies and patient to report if becomes symptomatic  Plans to restart her lexapro - currently not taking  All questions have been answered to her satisfaction  RTO for [facility] or sooner if needed      Subjective     HPI   Graham Godoy is a 34 y o  female who presents for annual well woman exam    Menarche - 15; LMP - about 2 weeks ago; Periods are reg q month and last 2 days; No excessive bleeding; No intermenstrual bleeding or spotting; Cramps are tolerable  No vulvar itch/burn; No vaginal itch/burn; No abn discharge or odor; No urinary sx - burning/pain/frequency/hematuria  (+) SBEs - no breast masses, asymmetry, nipple discharge or bleeding, changes in skin of breast, or breast tenderness bilaterally  No abd/pelvic pain or HAs;   Pt is sexually active in a mutually monog sexual relationship x 3 months; No issues with intercourse; Patient tested positive for chlamydia on 11/15/18 and treated - she took the full course of treatment and her partner was treated as well - she abstained at least a week after treatment; G/T negative at that time; STD BW WNL at that time - will plan repeat at 6 months and 1 year; Feels safe at home  Current contraception: started Junel Fe 1/20 11/2018 for birth control - working well with no SEs except low sex drive; was on nexplanon in the past - had removed because at the time when she was with her  he had a vasectomy - no longer together so started on OCP for birth control  Condom use: no  (-) PCP for routine Bw/care; Last Pap - 2/9/18 - ASC-H (+) type 16; (-) type 18 and other HRHPV; 3/2018 - colpo ECC negative and cervical biopsy [facility] ERNIE 2/3; 3/2018 - LEEP done  History of abnormal Pap smear: yes    Review of Systems   Constitutional: Negative for activity change, fatigue, fever and unexpected weight change     HENT: Negative for congestion, dental problem, sinus pressure and sinus pain  Eyes: Negative for visual disturbance  Respiratory: Negative for cough, shortness of breath and wheezing  Cardiovascular: Negative for chest pain and leg swelling  Gastrointestinal: Negative for abdominal distention, abdominal pain, blood in stool, constipation, diarrhea, nausea and vomiting  Endocrine: Negative for polydipsia  Genitourinary: Negative for difficulty urinating, dyspareunia, dysuria, frequency, hematuria, menstrual problem, pelvic pain, urgency, vaginal bleeding, vaginal discharge and vaginal pain  Musculoskeletal: Negative for arthralgias and back pain  Allergic/Immunologic: Negative for environmental allergies  Neurological: Negative for dizziness, seizures and headaches  Psychiatric/Behavioral: Negative for dysphoric mood and sleep disturbance  The patient is not nervous/anxious          The following portions of the patient's history were reviewed and updated as appropriate: allergies, current medications, past family history, past medical history, past social history, past surgical history and problem list          OB History        0    Para   0    Term   0       0    AB   0    Living   0       SAB   0    TAB   0    Ectopic   0    Multiple   0    Live Births   0                 Past Medical History:   Diagnosis Date    Abnormal Pap smear of cervix 2018    ASC-H    Anxiety     Asthma     Cervical lesion     Degeneration, intervertebral disc, lumbar     Depression     HPV (human papilloma virus) infection 2018    (+) type 16; negative type 18 and other HRHPV       Past Surgical History:   Procedure Laterality Date    CERVICAL BIOPSY  W/ LOOP ELECTRODE EXCISION  2018    [facility] N/A 3/23/2018    Procedure: BIOPSY LEEP CERVIX;  Surgeon: Rashmi Qureshi MD;  [facility];  Service: Gynecology    RHINOPLASTY      SHOULDER SURGERY Right     anchor inserted    TOOTH EXTRACTION         Family History   Problem Relation Age of Onset   Yamila Riojas Cancer Mother     Melanoma Mother     Diabetes Father     Hypertension Father     Hyperlipidemia Brother     Ovarian cancer Paternal Grandmother     Cancer Paternal Uncle     Diabetes Family     Hypertension Family        Social History     Socioeconomic History    Marital status: /Civil Union     Spouse name: Not on file    Number of children: Not on file    Years of education: Not on file    Highest education level: Not on file   Occupational History    Not on file   Social Needs    Financial resource strain: Not on file    Food insecurity:     Worry: Not on file     Inability: Not on file    Transportation needs:     Medical: Not on file     Non-medical: Not on file   Tobacco Use    Smoking status: Never Smoker    Smokeless tobacco: Never Used   Substance and Sexual Activity    Alcohol use:  Yes     Alcohol/week: 0 6 oz     Types: 1 Glasses of wine per week    Drug use: No    Sexual activity: Yes     Partners: Male     Birth control/protection: None   Lifestyle    Physical activity:     Days per week: Not on file     Minutes per session: Not on file    Stress: Not on file   Relationships    Social connections:     Talks on phone: Not on file     Gets together: Not on file     Attends Congregation service: Not on file     Active member of club or organization: Not on file     Attends meetings of clubs or organizations: Not on file     Relationship status: Not on file    Intimate partner violence:     Fear of current or ex partner: Not on file     Emotionally abused: Not on file     Physically abused: Not on file     Forced sexual activity: Not on file   Other Topics Concern    Not on file   Social History Narrative    Not on file         Current Outpatient Medications:     albuterol (VENTOLIN HFA) 90 mcg/act inhaler, Inhale 2 puffs 4 (four) times a day  , Disp: , Rfl:     budesonide-formoterol (SYMBICORT) 160-4 5 mcg/act inhaler, Inhale 2 puffs daily, Disp: , Rfl:     Multiple Vitamin (MULTIVITAMIN) tablet, Take 1 tablet by mouth daily, Disp: , Rfl:     norethindrone-ethinyl estradiol (JUNEL FE 1/20) 1-20 MG-MCG per tablet, Take 1 tablet by mouth daily, Disp: 84 tablet, Rfl: 3    Probiotic Product (PROBIOTIC PO), Take by mouth, Disp: , Rfl:     Turmeric 500 MG TABS, Take by mouth, Disp: , Rfl:     escitalopram (LEXAPRO) 20 mg tablet, Take 20 mg by mouth daily, Disp: , Rfl: 1    Allergies   Allergen Reactions    Cat Hair Extract Allergic Rhinitis    Pollen Extract Allergic Rhinitis       Objective   Vitals:    02/14/19 0835   BP: 108/72   BP Location: Left arm   Patient Position: Sitting   Cuff Size: Standard   Weight: 76 7 kg (169 lb)   Height: 5' 8" (1 727 m)     Physical Exam   Constitutional: She appears well-developed and well-nourished  No distress  Neck: No thyromegaly present  Cardiovascular: Normal rate, regular rhythm and normal heart sounds  No murmur heard  Pulmonary/Chest: Effort normal and breath sounds normal  No respiratory distress  She has no wheezes  Right breast exhibits no inverted nipple, no mass, no nipple discharge, no skin change and no tenderness  Left breast exhibits no inverted nipple, no mass, no nipple discharge, no skin change and no tenderness  Breasts are symmetrical    Abdominal: Soft  She exhibits no distension and no mass  There is no tenderness  Genitourinary: Vagina normal and uterus normal  Pelvic exam was performed with patient supine  There is no rash, tenderness or lesion on the right labia  There is no rash, tenderness or lesion on the left labia  Uterus is not deviated, not enlarged, not fixed and not tender  Cervix exhibits no motion tenderness, no discharge and no friability  Right adnexum displays no mass, no tenderness and no fullness  Left adnexum displays no mass, no tenderness and no fullness   No erythema, tenderness or bleeding in the vagina  No foreign body in the vagina  No vaginal discharge found  Musculoskeletal: She exhibits no edema  Lymphadenopathy:     She has no cervical adenopathy  She has no axillary adenopathy  Right: No inguinal adenopathy present  Left: No inguinal adenopathy present  Neurological: She is alert  Skin: Skin is warm  She is not diaphoretic  Psychiatric: She has a normal mood and affect  Her behavior is normal    Vitals reviewed

## 2019-02-14 NOTE — ASSESSMENT & PLAN NOTE
The current ASCCP guidelines were reviewed  Patient's last pap was 2/9/18 - ASC-H (+) type 16; (-) type 18 and other HRHPV and therefore, a pap with HPV cotesting is indicated at this time  I emphasized the importance of an annual pelvic and breast exam  Patient ok to have a pap done today

## 2019-02-20 LAB
DEPRECATED C TRACH RRNA XXX QL PRB: NOT DETECTED
HPV HR 12 DNA CVX QL NAA+PROBE: DETECTED
HPV16 DNA SPEC QL NAA+PROBE: DETECTED
HPV18 DNA SPEC QL NAA+PROBE: NOT DETECTED
THIN PREP CVX: ABNORMAL

## 2019-03-01 ENCOUNTER — PROCEDURE VISIT (OUTPATIENT)
Dept: OBGYN CLINIC | Facility: CLINIC | Age: 34
End: 2019-03-01
Payer: COMMERCIAL

## 2019-03-01 VITALS
DIASTOLIC BLOOD PRESSURE: 70 MMHG | BODY MASS INDEX: 25.46 KG/M2 | WEIGHT: 168 LBS | SYSTOLIC BLOOD PRESSURE: 106 MMHG | HEIGHT: 68 IN

## 2019-03-01 DIAGNOSIS — R87.612 LOW GRADE SQUAMOUS INTRAEPITHELIAL LESION (LGSIL) ON CERVICAL PAP SMEAR: Primary | ICD-10-CM

## 2019-03-01 PROCEDURE — 57456 ENDOCERV CURETTAGE W/SCOPE: CPT | Performed by: PHYSICIAN ASSISTANT

## 2019-03-01 NOTE — PROGRESS NOTES
Assessment/Plan:    Low grade squamous intraepithelial lesion (LGSIL) on cervical Pap smear  No intercourse, tampon use, soaking in bath tub, or swimming for the next 5 days to avoid risk of infection  Call office with excessive bleeding, cramping, fever, or chills  Office will call with results and appropriate follow up  Problem List Items Addressed This Visit        Other    Low grade squamous intraepithelial lesion (LGSIL) on cervical Pap smear - Primary     No intercourse, tampon use, soaking in bath tub, or swimming for the next 5 days to avoid risk of infection  Call office with excessive bleeding, cramping, fever, or chills  Office will call with results and appropriate follow up  Relevant Orders    GP ENDO-CERVICAL BIOPSY            Subjective:      Patient ID: Danay Molina is a 34 y o  female  HPI  35 yo seen for colposcopy  Patient had a LEEP procedure 3/23/2018 for HGSIL CIN3, margins negative for dysplasia  Pt nonsmoker, no family hx of GYN cancers  Reports 1 new partner in the last year  Pap smear: 2/14/2019 LGSIL, HPV 16 and non 16, 18  The following portions of the patient's history were reviewed and updated as appropriate:   She  has a past medical history of Abnormal Pap smear of cervix (02/2018), Anxiety, Asthma, Cervical lesion, Degeneration, intervertebral disc, lumbar, Depression, and HPV (human papilloma virus) infection (02/2018)    She   Patient Active Problem List    Diagnosis Date Noted    Low grade squamous intraepithelial lesion (LGSIL) on cervical Pap smear 03/04/2019    Encounter for gynecological examination (general) (routine) without abnormal findings 02/14/2019    S/P LEEP of cervix 03/23/2018    Moderate dysplasia of cervix (ERNIE II) 03/15/2018    Enlarged thyroid 02/09/2018    Other tenosynovitis of hand and wrist 01/29/2014    Neck pain 10/23/2013    DDD (degenerative disc disease), cervical 09/04/2013    Lower back pain 09/04/2013    Myofascial pain syndrome 09/04/2013    Disc degeneration, lumbar 07/26/2013    Allergic rhinitis 07/25/2013     She  has a past surgical history that includes Rhinoplasty; Tooth extraction; Shoulder surgery (Right); pr conization cervix,loop electrd (N/A, 3/23/2018); and Cervical biopsy w/ loop electrode excision (03/2018)  Her family history includes Cancer in her mother and paternal uncle; Diabetes in her family and father; Hyperlipidemia in her brother; Hypertension in her family and father; Melanoma in her mother; Ovarian cancer in her paternal grandmother  She  reports that she has never smoked  She has never used smokeless tobacco  She reports that she drinks about 0 6 oz of alcohol per week  She reports that she does not use drugs  Current Outpatient Medications   Medication Sig Dispense Refill    albuterol (VENTOLIN HFA) 90 mcg/act inhaler Inhale 2 puffs 4 (four) times a day        budesonide-formoterol (SYMBICORT) 160-4 5 mcg/act inhaler Inhale 2 puffs daily      escitalopram (LEXAPRO) 20 mg tablet Take 20 mg by mouth daily  1    Multiple Vitamin (MULTIVITAMIN) tablet Take 1 tablet by mouth daily      norethindrone-ethinyl estradiol (JUNEL FE 1/20) 1-20 MG-MCG per tablet Take 1 tablet by mouth daily 84 tablet 3    Probiotic Product (PROBIOTIC PO) Take by mouth      Turmeric 500 MG TABS Take by mouth       No current facility-administered medications for this visit  She is allergic to cat hair extract and pollen extract       Review of Systems   Constitutional: Negative for fatigue, fever and unexpected weight change  HENT: Negative for dental problem and sinus pressure  Eyes: Negative for visual disturbance  Respiratory: Negative for cough, shortness of breath and wheezing  Cardiovascular: Negative for chest pain  Gastrointestinal: Negative for abdominal pain, blood in stool, constipation, diarrhea, nausea and vomiting  Endocrine: Negative for polydipsia     Genitourinary: Negative for difficulty urinating, dyspareunia, dysuria, frequency, hematuria, pelvic pain and urgency  Musculoskeletal: Negative for arthralgias and back pain  Neurological: Negative for dizziness, seizures, light-headedness and headaches  Psychiatric/Behavioral: Negative for suicidal ideas  The patient is not nervous/anxious  Objective:      /70 (BP Location: Left arm, Patient Position: Sitting, Cuff Size: Standard)   Ht 5' 8" (1 727 m)   Wt 76 2 kg (168 lb)   LMP  (LMP Unknown)   BMI 25 54 kg/m²          Physical Exam   Constitutional: She is oriented to person, place, and time  She appears well-developed and well-nourished  Genitourinary: There is no rash, tenderness, lesion or injury on the right labia  There is no rash, tenderness, lesion or injury on the left labia  Cervix exhibits no motion tenderness, no discharge and no friability  No erythema, tenderness or bleeding in the vagina  No foreign body in the vagina  No signs of injury around the vagina  No vaginal discharge found  Neurological: She is alert and oriented to person, place, and time  Skin: Skin is warm and dry  No rash noted  No erythema  No pallor           Colposcopy  Date/Time: 3/4/2019 9:42 PM  Performed by: Liliya Mendenhall PA-C  Authorized by: Liliya Mendenhall PA-C     Consent:     Consent obtained:  Verbal and written    Consent given by:  Patient    Procedural risks discussed:  Bleeding, failure rate, infection, repeat procedure and possible continued pain    Patient questions answered: yes      Patient agrees, verbalizes understanding, and wants to proceed: yes      Instructions and paperwork completed: yes    Universal protocol:     Patient states understanding of procedure being performed: yes      Relevant documents present and verified: yes      Test results available and properly labeled: yes      Required blood products, implants, devices, and special equipment available: yes Pre-procedure:     Premeds:  Ibuprofen    Prepped with: acetic acid    Indication:     Indication:  LSIL  Procedure:     Procedure: Colposcopy w/ endocervical curettage      [place] speculum was placed in the vagina: yes      Endocervix was curetted using a Kevorkian curette: yes      Monsel's solution was applied: yes      Biopsy(s): yes      Location:  Endocervical    Specimen to pathology: yes    Post-procedure:     Patient tolerance of procedure:   Tolerated well, no immediate complications

## 2019-03-04 PROBLEM — R87.612 LOW GRADE SQUAMOUS INTRAEPITHELIAL LESION (LGSIL) ON CERVICAL PAP SMEAR: Status: ACTIVE | Noted: 2019-03-04

## 2019-03-05 LAB — BIOPSY SPEC-IMP: NORMAL

## 2019-03-06 NOTE — RESULT ENCOUNTER NOTE
Spoke with patient informed of results    Would like to schedule appt to discuss possible hysterectomy  MM CMA

## 2019-04-08 ENCOUNTER — OFFICE VISIT (OUTPATIENT)
Dept: OBGYN CLINIC | Facility: CLINIC | Age: 34
End: 2019-04-08
Payer: COMMERCIAL

## 2019-04-08 VITALS
WEIGHT: 169 LBS | DIASTOLIC BLOOD PRESSURE: 68 MMHG | SYSTOLIC BLOOD PRESSURE: 118 MMHG | HEIGHT: 68 IN | BODY MASS INDEX: 25.61 KG/M2

## 2019-04-08 DIAGNOSIS — N87.1 MODERATE DYSPLASIA OF CERVIX (CIN II): Primary | ICD-10-CM

## 2019-04-08 PROCEDURE — 99213 OFFICE O/P EST LOW 20 MIN: CPT | Performed by: OBSTETRICS & GYNECOLOGY

## 2019-04-15 PROBLEM — N87.9 CERVICAL DYSPLASIA: Status: ACTIVE | Noted: 2019-04-15

## 2019-05-15 ENCOUNTER — OFFICE VISIT (OUTPATIENT)
Dept: OBGYN CLINIC | Facility: CLINIC | Age: 34
End: 2019-05-15
Payer: COMMERCIAL

## 2019-05-15 VITALS
DIASTOLIC BLOOD PRESSURE: 68 MMHG | BODY MASS INDEX: 25.46 KG/M2 | HEIGHT: 68 IN | WEIGHT: 168 LBS | SYSTOLIC BLOOD PRESSURE: 104 MMHG

## 2019-05-15 DIAGNOSIS — N76.0 BACTERIAL VAGINOSIS: Primary | ICD-10-CM

## 2019-05-15 DIAGNOSIS — R30.0 DYSURIA: ICD-10-CM

## 2019-05-15 DIAGNOSIS — Z11.3 SCREENING EXAMINATION FOR STD (SEXUALLY TRANSMITTED DISEASE): ICD-10-CM

## 2019-05-15 DIAGNOSIS — N89.8 VAGINAL ITCHING: ICD-10-CM

## 2019-05-15 DIAGNOSIS — B96.89 BACTERIAL VAGINOSIS: Primary | ICD-10-CM

## 2019-05-15 PROCEDURE — 99214 OFFICE O/P EST MOD 30 MIN: CPT | Performed by: NURSE PRACTITIONER

## 2019-05-15 RX ORDER — METRONIDAZOLE 500 MG/1
500 TABLET ORAL EVERY 12 HOURS SCHEDULED
Qty: 14 TABLET | Refills: 0 | Status: SHIPPED | OUTPATIENT
Start: 2019-05-15 | End: 2019-05-22

## 2019-05-16 LAB
DEPRECATED C TRACH RRNA XXX QL PRB: NOT DETECTED
N GONORRHOEA DNA UR QL NAA+PROBE: NOT DETECTED

## 2019-05-17 ENCOUNTER — TELEPHONE (OUTPATIENT)
Dept: OBGYN CLINIC | Facility: CLINIC | Age: 34
End: 2019-05-17

## 2019-05-17 LAB — BACTERIA UR CULT: NORMAL

## 2019-05-19 LAB — SL AMB GENITAL CULTURE: ABNORMAL

## 2019-05-20 DIAGNOSIS — B37.3 YEAST VAGINITIS: Primary | ICD-10-CM

## 2019-05-20 RX ORDER — FLUCONAZOLE 150 MG/1
150 TABLET ORAL EVERY OTHER DAY
Qty: 2 TABLET | Refills: 0 | Status: SHIPPED | OUTPATIENT
Start: 2019-05-20 | End: 2019-05-23

## 2019-06-14 ENCOUNTER — APPOINTMENT (OUTPATIENT)
Dept: LAB | Facility: HOSPITAL | Age: 34
End: 2019-06-14
Attending: OBSTETRICS & GYNECOLOGY
Payer: COMMERCIAL

## 2019-06-14 DIAGNOSIS — N87.9 CERVICAL DYSPLASIA: ICD-10-CM

## 2019-06-14 LAB
ABO GROUP BLD: NORMAL
ANION GAP SERPL CALCULATED.3IONS-SCNC: 6 MMOL/L (ref 4–13)
BASOPHILS # BLD AUTO: 0.04 THOUSANDS/ΜL (ref 0–0.1)
BASOPHILS NFR BLD AUTO: 0 % (ref 0–1)
BLD GP AB SCN SERPL QL: NEGATIVE
BUN SERPL-MCNC: 9 MG/DL (ref 5–25)
CALCIUM SERPL-MCNC: 8.7 MG/DL (ref 8.3–10.1)
CHLORIDE SERPL-SCNC: 105 MMOL/L (ref 100–108)
CO2 SERPL-SCNC: 25 MMOL/L (ref 21–32)
CREAT SERPL-MCNC: 0.59 MG/DL (ref 0.6–1.3)
EOSINOPHIL # BLD AUTO: 0.06 THOUSAND/ΜL (ref 0–0.61)
EOSINOPHIL NFR BLD AUTO: 1 % (ref 0–6)
ERYTHROCYTE [DISTWIDTH] IN BLOOD BY AUTOMATED COUNT: 13 % (ref 11.6–15.1)
EST. AVERAGE GLUCOSE BLD GHB EST-MCNC: 100 MG/DL
GFR SERPL CREATININE-BSD FRML MDRD: 122 ML/MIN/1.73SQ M
GLUCOSE P FAST SERPL-MCNC: 80 MG/DL (ref 65–99)
HBA1C MFR BLD: 5.1 % (ref 4.2–6.3)
HCT VFR BLD AUTO: 40.7 % (ref 34.8–46.1)
HGB BLD-MCNC: 13.1 G/DL (ref 11.5–15.4)
IMM GRANULOCYTES # BLD AUTO: 0.05 THOUSAND/UL (ref 0–0.2)
IMM GRANULOCYTES NFR BLD AUTO: 0 % (ref 0–2)
LYMPHOCYTES # BLD AUTO: 2.03 THOUSANDS/ΜL (ref 0.6–4.47)
LYMPHOCYTES NFR BLD AUTO: 18 % (ref 14–44)
MCH RBC QN AUTO: 29.6 PG (ref 26.8–34.3)
MCHC RBC AUTO-ENTMCNC: 32.2 G/DL (ref 31.4–37.4)
MCV RBC AUTO: 92 FL (ref 82–98)
MONOCYTES # BLD AUTO: 0.63 THOUSAND/ΜL (ref 0.17–1.22)
MONOCYTES NFR BLD AUTO: 6 % (ref 4–12)
NEUTROPHILS # BLD AUTO: 8.74 THOUSANDS/ΜL (ref 1.85–7.62)
NEUTS SEG NFR BLD AUTO: 75 % (ref 43–75)
NRBC BLD AUTO-RTO: 0 /100 WBCS
PLATELET # BLD AUTO: 292 THOUSANDS/UL (ref 149–390)
PMV BLD AUTO: 9.6 FL (ref 8.9–12.7)
POTASSIUM SERPL-SCNC: 3.6 MMOL/L (ref 3.5–5.3)
RBC # BLD AUTO: 4.43 MILLION/UL (ref 3.81–5.12)
RH BLD: POSITIVE
SODIUM SERPL-SCNC: 136 MMOL/L (ref 136–145)
SPECIMEN EXPIRATION DATE: NORMAL
WBC # BLD AUTO: 11.55 THOUSAND/UL (ref 4.31–10.16)

## 2019-06-14 PROCEDURE — 80048 BASIC METABOLIC PNL TOTAL CA: CPT

## 2019-06-14 PROCEDURE — 86900 BLOOD TYPING SEROLOGIC ABO: CPT

## 2019-06-14 PROCEDURE — 83036 HEMOGLOBIN GLYCOSYLATED A1C: CPT

## 2019-06-14 PROCEDURE — 86901 BLOOD TYPING SEROLOGIC RH(D): CPT

## 2019-06-14 PROCEDURE — 86850 RBC ANTIBODY SCREEN: CPT

## 2019-06-14 PROCEDURE — 36415 COLL VENOUS BLD VENIPUNCTURE: CPT

## 2019-06-14 PROCEDURE — 85025 COMPLETE CBC W/AUTO DIFF WBC: CPT

## 2019-06-14 RX ORDER — ALPRAZOLAM 0.5 MG/1
TABLET ORAL
COMMUNITY
End: 2020-07-28 | Stop reason: SDUPTHER

## 2019-06-14 RX ORDER — IBUPROFEN 400 MG/1
TABLET ORAL EVERY 6 HOURS PRN
COMMUNITY
End: 2019-06-21 | Stop reason: HOSPADM

## 2019-06-14 RX ORDER — ESCITALOPRAM OXALATE 10 MG/1
10 TABLET ORAL DAILY
COMMUNITY
End: 2020-07-21 | Stop reason: SDUPTHER

## 2019-06-16 PROCEDURE — NC001 PR NO CHARGE: Performed by: OBSTETRICS & GYNECOLOGY

## 2019-06-20 ENCOUNTER — ANESTHESIA EVENT (OUTPATIENT)
Dept: PERIOP | Facility: HOSPITAL | Age: 34
End: 2019-06-20
Payer: COMMERCIAL

## 2019-06-21 ENCOUNTER — HOSPITAL ENCOUNTER (OUTPATIENT)
Facility: HOSPITAL | Age: 34
Setting detail: OUTPATIENT SURGERY
Discharge: HOME | End: 2019-06-21
Attending: OBSTETRICS & GYNECOLOGY | Admitting: OBSTETRICS & GYNECOLOGY
Payer: COMMERCIAL

## 2019-06-21 ENCOUNTER — ANESTHESIA (OUTPATIENT)
Dept: PERIOP | Facility: HOSPITAL | Age: 34
End: 2019-06-21
Payer: COMMERCIAL

## 2019-06-21 VITALS
BODY MASS INDEX: 25.46 KG/M2 | WEIGHT: 168 LBS | HEART RATE: 117 BPM | SYSTOLIC BLOOD PRESSURE: 112 MMHG | TEMPERATURE: 99.2 F | OXYGEN SATURATION: 97 % | RESPIRATION RATE: 16 BRPM | HEIGHT: 68 IN | DIASTOLIC BLOOD PRESSURE: 45 MMHG

## 2019-06-21 DIAGNOSIS — N87.9 CERVICAL DYSPLASIA: ICD-10-CM

## 2019-06-21 DIAGNOSIS — Z90.710 S/P LAPAROSCOPIC ASSISTED VAGINAL HYSTERECTOMY (LAVH): Primary | ICD-10-CM

## 2019-06-21 LAB
EXT PREGNANCY TEST URINE: NEGATIVE
EXT. CONTROL: NORMAL

## 2019-06-21 PROCEDURE — 88307 TISSUE EXAM BY PATHOLOGIST: CPT | Performed by: PATHOLOGY

## 2019-06-21 PROCEDURE — 88344 IMHCHEM/IMCYTCHM EA MLT ANTB: CPT | Performed by: PATHOLOGY

## 2019-06-21 PROCEDURE — 81025 URINE PREGNANCY TEST: CPT | Performed by: OBSTETRICS & GYNECOLOGY

## 2019-06-21 PROCEDURE — 58552 LAPARO-VAG HYST INCL T/O: CPT | Performed by: OBSTETRICS & GYNECOLOGY

## 2019-06-21 RX ORDER — GLYCOPYRROLATE 0.2 MG/ML
INJECTION INTRAMUSCULAR; INTRAVENOUS AS NEEDED
Status: DISCONTINUED | OUTPATIENT
Start: 2019-06-21 | End: 2019-06-21 | Stop reason: SURG

## 2019-06-21 RX ORDER — SODIUM CHLORIDE, SODIUM LACTATE, POTASSIUM CHLORIDE, CALCIUM CHLORIDE 600; 310; 30; 20 MG/100ML; MG/100ML; MG/100ML; MG/100ML
125 INJECTION, SOLUTION INTRAVENOUS CONTINUOUS
Status: DISCONTINUED | OUTPATIENT
Start: 2019-06-21 | End: 2019-06-21 | Stop reason: HOSPADM

## 2019-06-21 RX ORDER — MAGNESIUM HYDROXIDE 1200 MG/15ML
LIQUID ORAL AS NEEDED
Status: DISCONTINUED | OUTPATIENT
Start: 2019-06-21 | End: 2019-06-21 | Stop reason: HOSPADM

## 2019-06-21 RX ORDER — NEOSTIGMINE METHYLSULFATE 1 MG/ML
INJECTION INTRAVENOUS AS NEEDED
Status: DISCONTINUED | OUTPATIENT
Start: 2019-06-21 | End: 2019-06-21 | Stop reason: SURG

## 2019-06-21 RX ORDER — METRONIDAZOLE 7.5 MG/G
GEL VAGINAL AS NEEDED
Status: DISCONTINUED | OUTPATIENT
Start: 2019-06-21 | End: 2019-06-21 | Stop reason: HOSPADM

## 2019-06-21 RX ORDER — FENTANYL CITRATE/PF 50 MCG/ML
25 SYRINGE (ML) INJECTION
Status: DISCONTINUED | OUTPATIENT
Start: 2019-06-21 | End: 2019-06-21 | Stop reason: HOSPADM

## 2019-06-21 RX ORDER — MEPERIDINE HYDROCHLORIDE 25 MG/ML
12.5 INJECTION INTRAMUSCULAR; INTRAVENOUS; SUBCUTANEOUS
Status: DISCONTINUED | OUTPATIENT
Start: 2019-06-21 | End: 2019-06-21 | Stop reason: HOSPADM

## 2019-06-21 RX ORDER — IBUPROFEN 400 MG/1
600 TABLET ORAL EVERY 6 HOURS PRN
Status: DISCONTINUED | OUTPATIENT
Start: 2019-06-21 | End: 2019-06-21 | Stop reason: HOSPADM

## 2019-06-21 RX ORDER — FENTANYL CITRATE 50 UG/ML
INJECTION, SOLUTION INTRAMUSCULAR; INTRAVENOUS AS NEEDED
Status: DISCONTINUED | OUTPATIENT
Start: 2019-06-21 | End: 2019-06-21 | Stop reason: SURG

## 2019-06-21 RX ORDER — MIDAZOLAM HYDROCHLORIDE 1 MG/ML
INJECTION INTRAMUSCULAR; INTRAVENOUS AS NEEDED
Status: DISCONTINUED | OUTPATIENT
Start: 2019-06-21 | End: 2019-06-21 | Stop reason: SURG

## 2019-06-21 RX ORDER — ONDANSETRON 2 MG/ML
4 INJECTION INTRAMUSCULAR; INTRAVENOUS EVERY 6 HOURS PRN
Status: DISCONTINUED | OUTPATIENT
Start: 2019-06-21 | End: 2019-06-21 | Stop reason: HOSPADM

## 2019-06-21 RX ORDER — OXYCODONE HYDROCHLORIDE AND ACETAMINOPHEN 5; 325 MG/1; MG/1
1 TABLET ORAL EVERY 4 HOURS PRN
Status: DISCONTINUED | OUTPATIENT
Start: 2019-06-21 | End: 2019-06-21 | Stop reason: HOSPADM

## 2019-06-21 RX ORDER — DEXAMETHASONE SODIUM PHOSPHATE 10 MG/ML
INJECTION, SOLUTION INTRAMUSCULAR; INTRAVENOUS AS NEEDED
Status: DISCONTINUED | OUTPATIENT
Start: 2019-06-21 | End: 2019-06-21 | Stop reason: SURG

## 2019-06-21 RX ORDER — OXYCODONE HYDROCHLORIDE AND ACETAMINOPHEN 5; 325 MG/1; MG/1
1 TABLET ORAL EVERY 4 HOURS PRN
Qty: 12 TABLET | Refills: 0 | Status: SHIPPED | OUTPATIENT
Start: 2019-06-21 | End: 2019-06-28 | Stop reason: SDUPTHER

## 2019-06-21 RX ORDER — BUPIVACAINE HYDROCHLORIDE 2.5 MG/ML
INJECTION, SOLUTION INFILTRATION; PERINEURAL AS NEEDED
Status: DISCONTINUED | OUTPATIENT
Start: 2019-06-21 | End: 2019-06-21 | Stop reason: HOSPADM

## 2019-06-21 RX ORDER — HYDROMORPHONE HCL/PF 1 MG/ML
SYRINGE (ML) INJECTION AS NEEDED
Status: DISCONTINUED | OUTPATIENT
Start: 2019-06-21 | End: 2019-06-21 | Stop reason: SURG

## 2019-06-21 RX ORDER — ROCURONIUM BROMIDE 10 MG/ML
INJECTION, SOLUTION INTRAVENOUS AS NEEDED
Status: DISCONTINUED | OUTPATIENT
Start: 2019-06-21 | End: 2019-06-21 | Stop reason: SURG

## 2019-06-21 RX ORDER — MIDAZOLAM HYDROCHLORIDE 1 MG/ML
1 INJECTION INTRAMUSCULAR; INTRAVENOUS ONCE
Status: COMPLETED | OUTPATIENT
Start: 2019-06-21 | End: 2019-06-21

## 2019-06-21 RX ORDER — CEFAZOLIN SODIUM 1 G/50ML
1000 SOLUTION INTRAVENOUS ONCE
Status: COMPLETED | OUTPATIENT
Start: 2019-06-21 | End: 2019-06-21

## 2019-06-21 RX ORDER — HYDROMORPHONE HCL/PF 1 MG/ML
0.5 SYRINGE (ML) INJECTION
Status: COMPLETED | OUTPATIENT
Start: 2019-06-21 | End: 2019-06-21

## 2019-06-21 RX ORDER — ONDANSETRON 2 MG/ML
4 INJECTION INTRAMUSCULAR; INTRAVENOUS ONCE AS NEEDED
Status: DISCONTINUED | OUTPATIENT
Start: 2019-06-21 | End: 2019-06-21 | Stop reason: HOSPADM

## 2019-06-21 RX ORDER — ONDANSETRON 2 MG/ML
INJECTION INTRAMUSCULAR; INTRAVENOUS AS NEEDED
Status: DISCONTINUED | OUTPATIENT
Start: 2019-06-21 | End: 2019-06-21 | Stop reason: SURG

## 2019-06-21 RX ORDER — PROPOFOL 10 MG/ML
INJECTION, EMULSION INTRAVENOUS AS NEEDED
Status: DISCONTINUED | OUTPATIENT
Start: 2019-06-21 | End: 2019-06-21 | Stop reason: SURG

## 2019-06-21 RX ORDER — ONDANSETRON 2 MG/ML
4 INJECTION INTRAMUSCULAR; INTRAVENOUS ONCE AS NEEDED
Status: DISCONTINUED | OUTPATIENT
Start: 2019-06-21 | End: 2019-06-21

## 2019-06-21 RX ORDER — KETOROLAC TROMETHAMINE 30 MG/ML
30 INJECTION, SOLUTION INTRAMUSCULAR; INTRAVENOUS ONCE
Status: COMPLETED | OUTPATIENT
Start: 2019-06-21 | End: 2019-06-21

## 2019-06-21 RX ORDER — EPHEDRINE SULFATE 50 MG/ML
INJECTION INTRAVENOUS AS NEEDED
Status: DISCONTINUED | OUTPATIENT
Start: 2019-06-21 | End: 2019-06-21 | Stop reason: SURG

## 2019-06-21 RX ORDER — LIDOCAINE HYDROCHLORIDE 10 MG/ML
INJECTION, SOLUTION INFILTRATION; PERINEURAL AS NEEDED
Status: DISCONTINUED | OUTPATIENT
Start: 2019-06-21 | End: 2019-06-21 | Stop reason: SURG

## 2019-06-21 RX ORDER — IBUPROFEN 600 MG/1
600 TABLET ORAL EVERY 6 HOURS PRN
Qty: 30 TABLET | Refills: 0 | Status: SHIPPED | OUTPATIENT
Start: 2019-06-21 | End: 2019-06-28 | Stop reason: SDUPTHER

## 2019-06-21 RX ORDER — ACETAMINOPHEN 325 MG/1
975 TABLET ORAL ONCE
Status: COMPLETED | OUTPATIENT
Start: 2019-06-21 | End: 2019-06-21

## 2019-06-21 RX ADMIN — HYDROMORPHONE HYDROCHLORIDE 0.5 MG: 1 INJECTION, SOLUTION INTRAMUSCULAR; INTRAVENOUS; SUBCUTANEOUS at 11:14

## 2019-06-21 RX ADMIN — SODIUM CHLORIDE, SODIUM LACTATE, POTASSIUM CHLORIDE, AND CALCIUM CHLORIDE: .6; .31; .03; .02 INJECTION, SOLUTION INTRAVENOUS at 09:20

## 2019-06-21 RX ADMIN — IBUPROFEN 600 MG: 400 TABLET ORAL at 12:41

## 2019-06-21 RX ADMIN — KETOROLAC TROMETHAMINE 30 MG: 30 INJECTION, SOLUTION INTRAMUSCULAR at 10:37

## 2019-06-21 RX ADMIN — ROCURONIUM BROMIDE 50 MG: 10 INJECTION, SOLUTION INTRAVENOUS at 08:05

## 2019-06-21 RX ADMIN — FENTANYL CITRATE 50 MCG: 50 INJECTION, SOLUTION INTRAMUSCULAR; INTRAVENOUS at 10:28

## 2019-06-21 RX ADMIN — FENTANYL CITRATE 25 MCG: 50 INJECTION INTRAMUSCULAR; INTRAVENOUS at 10:47

## 2019-06-21 RX ADMIN — PROPOFOL 100 MG: 10 INJECTION, EMULSION INTRAVENOUS at 08:06

## 2019-06-21 RX ADMIN — ONDANSETRON 4 MG: 2 INJECTION INTRAMUSCULAR; INTRAVENOUS at 09:55

## 2019-06-21 RX ADMIN — FENTANYL CITRATE 50 MCG: 50 INJECTION, SOLUTION INTRAMUSCULAR; INTRAVENOUS at 08:03

## 2019-06-21 RX ADMIN — ACETAMINOPHEN 975 MG: 325 TABLET ORAL at 06:37

## 2019-06-21 RX ADMIN — EPHEDRINE SULFATE 5 MG: 50 INJECTION, SOLUTION INTRAVENOUS at 08:31

## 2019-06-21 RX ADMIN — CEFAZOLIN SODIUM 1000 MG: 1 SOLUTION INTRAVENOUS at 08:10

## 2019-06-21 RX ADMIN — GLYCOPYRROLATE 0.4 MG: 0.2 INJECTION, SOLUTION INTRAMUSCULAR; INTRAVENOUS at 10:13

## 2019-06-21 RX ADMIN — HYDROMORPHONE HYDROCHLORIDE 0.5 MG: 1 INJECTION, SOLUTION INTRAMUSCULAR; INTRAVENOUS; SUBCUTANEOUS at 08:24

## 2019-06-21 RX ADMIN — HYDROMORPHONE HYDROCHLORIDE 0.5 MG: 1 INJECTION, SOLUTION INTRAMUSCULAR; INTRAVENOUS; SUBCUTANEOUS at 11:18

## 2019-06-21 RX ADMIN — EPHEDRINE SULFATE 10 MG: 50 INJECTION, SOLUTION INTRAVENOUS at 08:34

## 2019-06-21 RX ADMIN — PROPOFOL 200 MG: 10 INJECTION, EMULSION INTRAVENOUS at 08:05

## 2019-06-21 RX ADMIN — LIDOCAINE HYDROCHLORIDE 50 MG: 10 INJECTION, SOLUTION INFILTRATION; PERINEURAL at 08:05

## 2019-06-21 RX ADMIN — HYDROMORPHONE HYDROCHLORIDE 0.5 MG: 1 INJECTION, SOLUTION INTRAMUSCULAR; INTRAVENOUS; SUBCUTANEOUS at 11:04

## 2019-06-21 RX ADMIN — OXYCODONE HYDROCHLORIDE AND ACETAMINOPHEN 1 TABLET: 5; 325 TABLET ORAL at 11:57

## 2019-06-21 RX ADMIN — HYDROMORPHONE HYDROCHLORIDE 0.5 MG: 1 INJECTION, SOLUTION INTRAMUSCULAR; INTRAVENOUS; SUBCUTANEOUS at 11:09

## 2019-06-21 RX ADMIN — SODIUM CHLORIDE, SODIUM LACTATE, POTASSIUM CHLORIDE, AND CALCIUM CHLORIDE: .6; .31; .03; .02 INJECTION, SOLUTION INTRAVENOUS at 07:27

## 2019-06-21 RX ADMIN — ROCURONIUM BROMIDE 10 MG: 10 INJECTION, SOLUTION INTRAVENOUS at 08:32

## 2019-06-21 RX ADMIN — MIDAZOLAM 1 MG: 1 INJECTION INTRAMUSCULAR; INTRAVENOUS at 10:57

## 2019-06-21 RX ADMIN — DEXAMETHASONE SODIUM PHOSPHATE 10 MG: 10 INJECTION, SOLUTION INTRAMUSCULAR; INTRAVENOUS at 08:10

## 2019-06-21 RX ADMIN — MIDAZOLAM 2 MG: 1 INJECTION INTRAMUSCULAR; INTRAVENOUS at 07:57

## 2019-06-21 RX ADMIN — HYDROMORPHONE HYDROCHLORIDE 0.5 MG: 1 INJECTION, SOLUTION INTRAMUSCULAR; INTRAVENOUS; SUBCUTANEOUS at 09:18

## 2019-06-21 RX ADMIN — ROCURONIUM BROMIDE 10 MG: 10 INJECTION, SOLUTION INTRAVENOUS at 09:37

## 2019-06-21 RX ADMIN — NEOSTIGMINE METHYLSULFATE 3 MG: 1 INJECTION, SOLUTION INTRAVENOUS at 10:13

## 2019-06-28 ENCOUNTER — OFFICE VISIT (OUTPATIENT)
Dept: OBGYN CLINIC | Facility: CLINIC | Age: 34
End: 2019-06-28

## 2019-06-28 VITALS
HEIGHT: 68 IN | DIASTOLIC BLOOD PRESSURE: 62 MMHG | BODY MASS INDEX: 24.86 KG/M2 | SYSTOLIC BLOOD PRESSURE: 108 MMHG | WEIGHT: 164 LBS

## 2019-06-28 DIAGNOSIS — Z90.710 S/P LAPAROSCOPIC ASSISTED VAGINAL HYSTERECTOMY (LAVH): Primary | ICD-10-CM

## 2019-06-28 PROCEDURE — 99024 POSTOP FOLLOW-UP VISIT: CPT | Performed by: OBSTETRICS & GYNECOLOGY

## 2019-06-28 RX ORDER — IBUPROFEN 600 MG/1
600 TABLET ORAL EVERY 6 HOURS PRN
Qty: 30 TABLET | Refills: 0 | Status: SHIPPED | OUTPATIENT
Start: 2019-06-28 | End: 2020-03-18

## 2019-06-28 RX ORDER — OXYCODONE HYDROCHLORIDE AND ACETAMINOPHEN 5; 325 MG/1; MG/1
1 TABLET ORAL EVERY 4 HOURS PRN
Qty: 12 TABLET | Refills: 0 | Status: SHIPPED | OUTPATIENT
Start: 2019-06-28 | End: 2019-07-08

## 2019-06-28 NOTE — PROGRESS NOTES
Subjective     Charlene Palma is a 30 y o   female here 1 week post LAVH bilateral salpingectomy  Patient reports tolerating regular diet, voiding and bowel without significant difficulty after some constipation, no bleeding or discharge at this time, somewhat sore moving slowly requests a little more Percocet  Reviewed pathology and that there were a few cells of ERNIE 2-3 found on the cervical specimen  Discussed getting Pap smear of vaginal cuff in this coming year  Personal health questionnaire reviewed: yes  Gynecologic History  No LMP recorded  Obstetric History  OB History    Para Term  AB Living   0 0 0 0 0 0   SAB TAB Ectopic Multiple Live Births   0 0 0 0 0         The following portions of the patient's history were reviewed and updated as appropriate: allergies, current medications, past family history, past medical history, past social history, past surgical history and problem list     Review of Systems  Review of Systems   Constitutional: Negative for chills, fatigue, fever and unexpected weight change  HENT: Negative for dental problem, sinus pressure and sinus pain  Eyes: Negative for visual disturbance  Respiratory: Negative for cough, shortness of breath and wheezing  Cardiovascular: Negative for chest pain and leg swelling  Gastrointestinal: Negative for constipation, diarrhea, nausea and vomiting  Genitourinary: Negative for menstrual problem, pelvic pain and urgency  Musculoskeletal: Negative for back pain  Allergic/Immunologic: Negative for environmental allergies  Neurological: Negative for dizziness and headaches          Objective     /62 (BP Location: Left arm, Patient Position: Sitting, Cuff Size: Standard)   Ht 5' 8" (1 727 m)   Wt 74 4 kg (164 lb)   BMI 24 94 kg/m²   Lungs: clear to auscultation bilaterally  Heart: regular rate and rhythm, S1, S2 normal, no murmur, click, rub or gallop  Abdomen: Soft mildly tender as appropriate for post surgery positive bowel sounds, incisions clean dry intact healing well      Assessment  30-year-old  1 week post LAVH bilateral salpingectomy for persistent cervical dysplasia  Plan  Return in 2-3 weeks for next postop check, script for Percocet given

## 2019-07-12 ENCOUNTER — OFFICE VISIT (OUTPATIENT)
Dept: OBGYN CLINIC | Facility: CLINIC | Age: 34
End: 2019-07-12

## 2019-07-12 VITALS
DIASTOLIC BLOOD PRESSURE: 72 MMHG | BODY MASS INDEX: 25.63 KG/M2 | WEIGHT: 169.1 LBS | HEIGHT: 68 IN | SYSTOLIC BLOOD PRESSURE: 124 MMHG

## 2019-07-12 DIAGNOSIS — Z90.710 S/P LAPAROSCOPIC ASSISTED VAGINAL HYSTERECTOMY (LAVH): Primary | ICD-10-CM

## 2019-07-12 PROCEDURE — 99024 POSTOP FOLLOW-UP VISIT: CPT | Performed by: OBSTETRICS & GYNECOLOGY

## 2019-07-12 NOTE — PROGRESS NOTES
Des Martinez is a 30 y o   female here for post op visit  Patient reports tolerating regular diet, voiding and bowel without difficulty  Not needing pain medication, feels steadily improving  Reviewed again pathology, plan for pap in abou 1 year  Personal health questionnaire reviewed: yes  Gynecologic History  No LMP recorded  Contraception: status post hysterectomy      Obstetric History  OB History    Para Term  AB Living   0 0 0 0 0 0   SAB TAB Ectopic Multiple Live Births   0 0 0 0 0         The following portions of the patient's history were reviewed and updated as appropriate: allergies, current medications, past family history, past medical history, past social history, past surgical history and problem list     Review of Systems  Review of Systems   Constitutional: Positive for fatigue  Negative for chills, fever and unexpected weight change  HENT: Negative for dental problem, sinus pressure and sinus pain  Eyes: Negative for visual disturbance  Respiratory: Negative for cough, shortness of breath and wheezing  Cardiovascular: Negative for chest pain and leg swelling  Gastrointestinal: Positive for constipation  Negative for diarrhea, nausea and vomiting  Genitourinary: Negative for menstrual problem, pelvic pain and urgency  Urine frequency   Musculoskeletal: Negative for back pain  Allergic/Immunologic: Negative for environmental allergies  Neurological: Negative for dizziness and headaches          Objective     /72 (BP Location: Right arm, Patient Position: Sitting, Cuff Size: Standard)   Ht 5' 8" (1 727 m)   Wt 76 7 kg (169 lb 1 6 oz)   BMI 25 71 kg/m²   General appearance: alert and oriented, in no acute distress  Lungs: clear to auscultation bilaterally  Heart: regular rate and rhythm, S1, S2 normal, no murmur, click, rub or gallop  Abdomen: soft, non-tender; bowel sounds normal; no masses,  no organomegaly and incisions clean dry intact healing well  Pelvic: external genitalia normal, vagina normal without discharge, uterus surgically absent and normal healing cuff, some stitches visible, nontender      Assessment  29 y/o  3 weeks post lavh bilateral salpingectomy for cervical dysplasia  Plan  Return for pap and annual in 6 months

## 2019-10-01 ENCOUNTER — APPOINTMENT (OUTPATIENT)
Dept: LAB | Facility: CLINIC | Age: 34
End: 2019-10-01
Payer: COMMERCIAL

## 2019-10-01 ENCOUNTER — TRANSCRIBE ORDERS (OUTPATIENT)
Dept: LAB | Facility: CLINIC | Age: 34
End: 2019-10-01

## 2019-10-01 DIAGNOSIS — R59.9 SWELLING OF LYMPH NODES: ICD-10-CM

## 2019-10-01 DIAGNOSIS — I51.9 MYXEDEMA HEART DISEASE: ICD-10-CM

## 2019-10-01 DIAGNOSIS — E03.9 MYXEDEMA HEART DISEASE: ICD-10-CM

## 2019-10-01 DIAGNOSIS — E78.41 ELEVATED LIPOPROTEIN A LEVEL: ICD-10-CM

## 2019-10-01 DIAGNOSIS — M79.10 MYALGIA: ICD-10-CM

## 2019-10-01 DIAGNOSIS — R61 GENERALIZED HYPERHIDROSIS: ICD-10-CM

## 2019-10-01 DIAGNOSIS — R53.83 OTHER FATIGUE: ICD-10-CM

## 2019-10-01 DIAGNOSIS — R59.9 SWELLING OF LYMPH NODES: Primary | ICD-10-CM

## 2019-10-01 LAB
ALBUMIN SERPL BCP-MCNC: 4.1 G/DL (ref 3.5–5)
ALP SERPL-CCNC: 103 U/L (ref 46–116)
ALT SERPL W P-5'-P-CCNC: 29 U/L (ref 12–78)
ANION GAP SERPL CALCULATED.3IONS-SCNC: 6 MMOL/L (ref 4–13)
AST SERPL W P-5'-P-CCNC: 17 U/L (ref 5–45)
BASOPHILS # BLD AUTO: 0.03 THOUSANDS/ΜL (ref 0–0.1)
BASOPHILS NFR BLD AUTO: 0 % (ref 0–1)
BILIRUB SERPL-MCNC: 0.31 MG/DL (ref 0.2–1)
BILIRUB UR QL STRIP: NEGATIVE
BUN SERPL-MCNC: 10 MG/DL (ref 5–25)
CALCIUM SERPL-MCNC: 9.1 MG/DL (ref 8.3–10.1)
CHLORIDE SERPL-SCNC: 110 MMOL/L (ref 100–108)
CHOLEST SERPL-MCNC: 251 MG/DL (ref 50–200)
CLARITY UR: CLEAR
CO2 SERPL-SCNC: 24 MMOL/L (ref 21–32)
COLOR UR: YELLOW
CREAT SERPL-MCNC: 0.6 MG/DL (ref 0.6–1.3)
CRP SERPL QL: 4.2 MG/L
EOSINOPHIL # BLD AUTO: 0.11 THOUSAND/ΜL (ref 0–0.61)
EOSINOPHIL NFR BLD AUTO: 2 % (ref 0–6)
ERYTHROCYTE [DISTWIDTH] IN BLOOD BY AUTOMATED COUNT: 12.8 % (ref 11.6–15.1)
GFR SERPL CREATININE-BSD FRML MDRD: 121 ML/MIN/1.73SQ M
GLUCOSE P FAST SERPL-MCNC: 82 MG/DL (ref 65–99)
GLUCOSE UR STRIP-MCNC: NEGATIVE MG/DL
HCT VFR BLD AUTO: 41.6 % (ref 34.8–46.1)
HDLC SERPL-MCNC: 82 MG/DL (ref 40–60)
HGB BLD-MCNC: 13.4 G/DL (ref 11.5–15.4)
HGB UR QL STRIP.AUTO: NEGATIVE
IMM GRANULOCYTES # BLD AUTO: 0.04 THOUSAND/UL (ref 0–0.2)
IMM GRANULOCYTES NFR BLD AUTO: 1 % (ref 0–2)
KETONES UR STRIP-MCNC: NEGATIVE MG/DL
LDH SERPL-CCNC: 165 U/L (ref 81–234)
LDLC SERPL CALC-MCNC: 153 MG/DL (ref 0–100)
LEUKOCYTE ESTERASE UR QL STRIP: NEGATIVE
LYMPHOCYTES # BLD AUTO: 2.14 THOUSANDS/ΜL (ref 0.6–4.47)
LYMPHOCYTES NFR BLD AUTO: 30 % (ref 14–44)
MCH RBC QN AUTO: 30 PG (ref 26.8–34.3)
MCHC RBC AUTO-ENTMCNC: 32.2 G/DL (ref 31.4–37.4)
MCV RBC AUTO: 93 FL (ref 82–98)
MONOCYTES # BLD AUTO: 0.45 THOUSAND/ΜL (ref 0.17–1.22)
MONOCYTES NFR BLD AUTO: 6 % (ref 4–12)
NEUTROPHILS # BLD AUTO: 4.42 THOUSANDS/ΜL (ref 1.85–7.62)
NEUTS SEG NFR BLD AUTO: 61 % (ref 43–75)
NITRITE UR QL STRIP: NEGATIVE
NONHDLC SERPL-MCNC: 169 MG/DL
NRBC BLD AUTO-RTO: 0 /100 WBCS
PH UR STRIP.AUTO: 8 [PH]
PLATELET # BLD AUTO: 334 THOUSANDS/UL (ref 149–390)
PMV BLD AUTO: 10.2 FL (ref 8.9–12.7)
POTASSIUM SERPL-SCNC: 3.9 MMOL/L (ref 3.5–5.3)
PROT SERPL-MCNC: 7.5 G/DL (ref 6.4–8.2)
PROT UR STRIP-MCNC: NEGATIVE MG/DL
RBC # BLD AUTO: 4.47 MILLION/UL (ref 3.81–5.12)
SODIUM SERPL-SCNC: 140 MMOL/L (ref 136–145)
SP GR UR STRIP.AUTO: 1.02 (ref 1–1.03)
TRIGL SERPL-MCNC: 82 MG/DL
TSH SERPL DL<=0.05 MIU/L-ACNC: 1.68 UIU/ML (ref 0.36–3.74)
UROBILINOGEN UR QL STRIP.AUTO: 0.2 E.U./DL
WBC # BLD AUTO: 7.19 THOUSAND/UL (ref 4.31–10.16)

## 2019-10-01 PROCEDURE — 80053 COMPREHEN METABOLIC PANEL: CPT

## 2019-10-01 PROCEDURE — 86592 SYPHILIS TEST NON-TREP QUAL: CPT

## 2019-10-01 PROCEDURE — 86376 MICROSOMAL ANTIBODY EACH: CPT

## 2019-10-01 PROCEDURE — 87389 HIV-1 AG W/HIV-1&-2 AB AG IA: CPT

## 2019-10-01 PROCEDURE — 87801 DETECT AGNT MULT DNA AMPLI: CPT

## 2019-10-01 PROCEDURE — 80061 LIPID PANEL: CPT

## 2019-10-01 PROCEDURE — 86140 C-REACTIVE PROTEIN: CPT

## 2019-10-01 PROCEDURE — 81003 URINALYSIS AUTO W/O SCOPE: CPT

## 2019-10-01 PROCEDURE — 86663 EPSTEIN-BARR ANTIBODY: CPT

## 2019-10-01 PROCEDURE — 83615 LACTATE (LD) (LDH) ENZYME: CPT

## 2019-10-01 PROCEDURE — 84443 ASSAY THYROID STIM HORMONE: CPT

## 2019-10-01 PROCEDURE — 82232 ASSAY OF BETA-2 PROTEIN: CPT

## 2019-10-01 PROCEDURE — 86665 EPSTEIN-BARR CAPSID VCA: CPT

## 2019-10-01 PROCEDURE — 36415 COLL VENOUS BLD VENIPUNCTURE: CPT

## 2019-10-01 PROCEDURE — 86800 THYROGLOBULIN ANTIBODY: CPT

## 2019-10-01 PROCEDURE — 85025 COMPLETE CBC W/AUTO DIFF WBC: CPT

## 2019-10-01 PROCEDURE — 87798 DETECT AGENT NOS DNA AMP: CPT

## 2019-10-02 LAB
B2 MICROGLOB SERPL-MCNC: 0.9 MG/L (ref 0.6–2.4)
EBV EA IGG SER-ACNC: 12.6 U/ML (ref 0–8.9)
EBV VCA IGM SER IA-ACNC: <36 U/ML (ref 0–35.9)
HIV 1+2 AB+HIV1 P24 AG SERPL QL IA: NORMAL
RPR SER QL: NORMAL
THYROGLOB AB SERPL-ACNC: <1 IU/ML (ref 0–0.9)
THYROPEROXIDASE AB SERPL-ACNC: 14 IU/ML (ref 0–34)

## 2019-10-04 LAB — MISCELLANEOUS LAB TEST RESULT: NORMAL

## 2019-10-16 ENCOUNTER — TRANSCRIBE ORDERS (OUTPATIENT)
Dept: LAB | Facility: CLINIC | Age: 34
End: 2019-10-16

## 2019-10-16 ENCOUNTER — APPOINTMENT (OUTPATIENT)
Dept: LAB | Facility: CLINIC | Age: 34
End: 2019-10-16
Payer: COMMERCIAL

## 2019-10-16 DIAGNOSIS — R59.9 ADENOPATHY: ICD-10-CM

## 2019-10-16 DIAGNOSIS — G89.3 NEOPLASM RELATED PAIN: ICD-10-CM

## 2019-10-16 DIAGNOSIS — E04.1 NONTOXIC UNINODULAR GOITER: ICD-10-CM

## 2019-10-16 DIAGNOSIS — R59.9 ADENOPATHY: Primary | ICD-10-CM

## 2019-10-16 LAB — LDH SERPL-CCNC: 169 U/L (ref 81–234)

## 2019-10-16 PROCEDURE — 83615 LACTATE (LD) (LDH) ENZYME: CPT

## 2019-10-16 PROCEDURE — 82164 ANGIOTENSIN I ENZYME TEST: CPT

## 2019-10-16 PROCEDURE — 36415 COLL VENOUS BLD VENIPUNCTURE: CPT

## 2019-10-17 LAB — ACE SERPL-CCNC: 24 U/L (ref 14–82)

## 2019-11-27 ENCOUNTER — APPOINTMENT (EMERGENCY)
Dept: CT IMAGING | Facility: HOSPITAL | Age: 34
End: 2019-11-27
Payer: COMMERCIAL

## 2019-11-27 ENCOUNTER — HOSPITAL ENCOUNTER (EMERGENCY)
Facility: HOSPITAL | Age: 34
Discharge: HOME | End: 2019-11-27
Attending: EMERGENCY MEDICINE | Admitting: EMERGENCY MEDICINE
Payer: COMMERCIAL

## 2019-11-27 VITALS
RESPIRATION RATE: 16 BRPM | BODY MASS INDEX: 25.58 KG/M2 | TEMPERATURE: 98.1 F | WEIGHT: 168.21 LBS | DIASTOLIC BLOOD PRESSURE: 55 MMHG | SYSTOLIC BLOOD PRESSURE: 101 MMHG | HEART RATE: 78 BPM | OXYGEN SATURATION: 98 %

## 2019-11-27 DIAGNOSIS — R19.7 VOMITING AND DIARRHEA: Primary | ICD-10-CM

## 2019-11-27 DIAGNOSIS — R11.10 VOMITING AND DIARRHEA: Primary | ICD-10-CM

## 2019-11-27 LAB
ALBUMIN SERPL BCP-MCNC: 3.6 G/DL (ref 3.5–5)
ALP SERPL-CCNC: 106 U/L (ref 46–116)
ALT SERPL W P-5'-P-CCNC: 29 U/L (ref 12–78)
ANION GAP SERPL CALCULATED.3IONS-SCNC: 9 MMOL/L (ref 4–13)
AST SERPL W P-5'-P-CCNC: 23 U/L (ref 5–45)
BASOPHILS # BLD MANUAL: 0 THOUSAND/UL (ref 0–0.1)
BASOPHILS NFR MAR MANUAL: 0 % (ref 0–1)
BILIRUB SERPL-MCNC: 0.28 MG/DL (ref 0.2–1)
BUN SERPL-MCNC: 7 MG/DL (ref 5–25)
CALCIUM SERPL-MCNC: 8.9 MG/DL (ref 8.3–10.1)
CHLORIDE SERPL-SCNC: 105 MMOL/L (ref 100–108)
CO2 SERPL-SCNC: 28 MMOL/L (ref 21–32)
CREAT SERPL-MCNC: 0.92 MG/DL (ref 0.6–1.3)
EOSINOPHIL # BLD MANUAL: 0 THOUSAND/UL (ref 0–0.4)
EOSINOPHIL NFR BLD MANUAL: 0 % (ref 0–6)
ERYTHROCYTE [DISTWIDTH] IN BLOOD BY AUTOMATED COUNT: 11.9 % (ref 11.6–15.1)
GFR SERPL CREATININE-BSD FRML MDRD: 83 ML/MIN/1.73SQ M
GLUCOSE SERPL-MCNC: 85 MG/DL (ref 65–140)
HCT VFR BLD AUTO: 39.7 % (ref 34.8–46.1)
HGB BLD-MCNC: 13.3 G/DL (ref 11.5–15.4)
LYMPHOCYTES # BLD AUTO: 2 THOUSAND/UL (ref 0.6–4.47)
LYMPHOCYTES # BLD AUTO: 20 % (ref 14–44)
MCH RBC QN AUTO: 30.1 PG (ref 26.8–34.3)
MCHC RBC AUTO-ENTMCNC: 33.5 G/DL (ref 31.4–37.4)
MCV RBC AUTO: 90 FL (ref 82–98)
MONOCYTES # BLD AUTO: 0.7 THOUSAND/UL (ref 0–1.22)
MONOCYTES NFR BLD: 7 % (ref 4–12)
NEUTROPHILS # BLD MANUAL: 6.99 THOUSAND/UL (ref 1.85–7.62)
NEUTS SEG NFR BLD AUTO: 70 % (ref 43–75)
NRBC BLD AUTO-RTO: 0 /100 WBCS
PLATELET # BLD AUTO: 287 THOUSANDS/UL (ref 149–390)
PLATELET BLD QL SMEAR: ADEQUATE
PMV BLD AUTO: 9 FL (ref 8.9–12.7)
POTASSIUM SERPL-SCNC: 3.5 MMOL/L (ref 3.5–5.3)
PROT SERPL-MCNC: 7.6 G/DL (ref 6.4–8.2)
RBC # BLD AUTO: 4.42 MILLION/UL (ref 3.81–5.12)
SODIUM SERPL-SCNC: 142 MMOL/L (ref 136–145)
TOTAL CELLS COUNTED SPEC: 100
VARIANT LYMPHS # BLD AUTO: 3 %
WBC # BLD AUTO: 9.98 THOUSAND/UL (ref 4.31–10.16)

## 2019-11-27 PROCEDURE — 96361 HYDRATE IV INFUSION ADD-ON: CPT

## 2019-11-27 PROCEDURE — 96374 THER/PROPH/DIAG INJ IV PUSH: CPT

## 2019-11-27 PROCEDURE — 74177 CT ABD & PELVIS W/CONTRAST: CPT

## 2019-11-27 PROCEDURE — 99284 EMERGENCY DEPT VISIT MOD MDM: CPT

## 2019-11-27 PROCEDURE — 85007 BL SMEAR W/DIFF WBC COUNT: CPT | Performed by: PHYSICIAN ASSISTANT

## 2019-11-27 PROCEDURE — 36415 COLL VENOUS BLD VENIPUNCTURE: CPT | Performed by: PHYSICIAN ASSISTANT

## 2019-11-27 PROCEDURE — 85027 COMPLETE CBC AUTOMATED: CPT | Performed by: PHYSICIAN ASSISTANT

## 2019-11-27 PROCEDURE — 96375 TX/PRO/DX INJ NEW DRUG ADDON: CPT

## 2019-11-27 PROCEDURE — 99284 EMERGENCY DEPT VISIT MOD MDM: CPT | Performed by: PHYSICIAN ASSISTANT

## 2019-11-27 PROCEDURE — 80053 COMPREHEN METABOLIC PANEL: CPT | Performed by: PHYSICIAN ASSISTANT

## 2019-11-27 RX ORDER — KETOROLAC TROMETHAMINE 30 MG/ML
10 INJECTION, SOLUTION INTRAMUSCULAR; INTRAVENOUS ONCE
Status: COMPLETED | OUTPATIENT
Start: 2019-11-27 | End: 2019-11-27

## 2019-11-27 RX ORDER — ONDANSETRON 4 MG/1
4 TABLET, FILM COATED ORAL EVERY 6 HOURS
Qty: 20 TABLET | Refills: 0 | Status: SHIPPED | OUTPATIENT
Start: 2019-11-27 | End: 2020-03-18

## 2019-11-27 RX ORDER — ONDANSETRON 2 MG/ML
4 INJECTION INTRAMUSCULAR; INTRAVENOUS ONCE
Status: COMPLETED | OUTPATIENT
Start: 2019-11-27 | End: 2019-11-27

## 2019-11-27 RX ADMIN — IOHEXOL 100 ML: 350 INJECTION, SOLUTION INTRAVENOUS at 16:38

## 2019-11-27 RX ADMIN — KETOROLAC TROMETHAMINE 9.9 MG: 30 INJECTION, SOLUTION INTRAMUSCULAR at 16:13

## 2019-11-27 RX ADMIN — SODIUM CHLORIDE 1000 ML: 0.9 INJECTION, SOLUTION INTRAVENOUS at 16:11

## 2019-11-27 RX ADMIN — ONDANSETRON 4 MG: 2 INJECTION INTRAMUSCULAR; INTRAVENOUS at 16:13

## 2019-11-27 NOTE — DISCHARGE INSTRUCTIONS
Discontinue Ciprofloxacin - Continue diphenoxylate and begin taking Zofran  You may take Motrin as needed for muscle aches and fever  Continue to hydrate as tolerated

## 2019-11-27 NOTE — ED PROVIDER NOTES
History  Chief Complaint   Patient presents with    Vomiting     Pt presents to the ED with diarrhea since this past Friday  Was seen at urgent care, put on cipro and diphenoxylate  Pt is now vomiting, not able to keep anything down  Patient is a 62-year-old female with no pertinent past medical history presenting for evaluation of vomiting and diarrhea  Patient states her symptoms began as diarrhea 5 days ago  When asked if there was a black, tarry appearance to the stool patient states that she is unsure of the appearance/color of her stool  She admits to intermittent abdominal pain near the umbilicus not associated with bowel movements or eating  Patient reports she was seen yesterday at an urgent care for these symptoms and prescribed Cipro and diphenoxylate  She states that this morning she began with vomiting and has not been able to tolerate any p o , but she does note that her diarrhea has improved today  She describes the vomitus as non-bilious and clear/yellow  The patient additionally reports having nasal congestion and headache, which she attributes to her environmental allergies  She denies fever, but admits to chills, sweats, fatigue, and generalized body aches  She denies recent travel, recent surgery, recent diet change  History provided by:  Patient   used: No    Vomiting   Severity:  Moderate  Timing:  Unable to specify  Number of daily episodes:  Multiple episodes today  Quality:  Stomach contents  Feeding tolerance: unable to tolerate PO  Progression:  Worsening  Chronicity:  New  Recent urination:  Normal  Relieved by: diarrhea relieved by Lomotil    Worsened by:  Liquids  Associated symptoms: abdominal pain, chills, diarrhea, headaches and myalgias    Associated symptoms: no cough, no fever and no sore throat    Diarrhea:     Quality:  Unable to specify    Severity:  Moderate    Duration:  7 days    Timing:  Unable to specify    Progression:  Improving      Prior to Admission Medications   Prescriptions Last Dose Informant Patient Reported? Taking? ALPRAZolam (XANAX) 0 5 mg tablet   Yes No   Sig: Take by mouth daily at bedtime as needed for anxiety   albuterol (VENTOLIN HFA) 90 mcg/act inhaler   Yes No   Sig: Inhale 2 puffs every 4 (four) hours as needed    budesonide-formoterol (SYMBICORT) 160-4 5 mcg/act inhaler   Yes No   Sig: Inhale 2 puffs daily   escitalopram (LEXAPRO) 10 mg tablet   Yes No   Sig: Take 10 mg by mouth daily   ibuprofen (MOTRIN) 600 mg tablet   No No   Sig: Take 1 tablet (600 mg total) by mouth every 6 (six) hours as needed for mild pain (cramping)      Facility-Administered Medications: None       Past Medical History:   Diagnosis Date    Abnormal Pap smear of cervix 02/2018    [facility]    Anxiety     Asthma     Cervical lesion     Degeneration, intervertebral disc, lumbar     Depression     HPV (human papilloma virus) infection 02/2018    (+) type 16; negative type 18 and other HRHPV       Past Surgical History:   Procedure Laterality Date    CERVICAL BIOPSY  W/ LOOP ELECTRODE EXCISION  03/2018     [facility] Ardon Elkader N/A 6/21/2019    Procedure: Anselmo Silva;  Surgeon: Miguel Delgado MD;  [facility];  Service: Gynecology    KS CONIZATION CERVIX,LOOP ELECTRD N/A 3/23/2018    Procedure: BIOPSY LEEP CERVIX;  Surgeon: Ulises Brady MD;  [facility];  Service: Gynecology    KS LAP,RMV  ADNEXAL STRUCTURE Bilateral 6/21/2019    Procedure: SALPINGECTOMY, LAPAROSCOPIC;  Surgeon: Miguel Delgado MD;  [facility];  Service: Gynecology    KS LAP,VAG HYST,UTERUS 250GMS/<,SALP-OOPH N/A 6/21/2019    Procedure: HYSTERECTOMY LAPAROSCOPIC ASSISTED VAGINAL (LAVH);   Surgeon: Miguel Delgado MD;  [facility];  Service: Gynecology    RHINOPLASTY      SHOULDER SURGERY Right     anchor inserted    TOOTH EXTRACTION         Family History   Problem Relation Age of Onset    Cancer Mother     Melanoma Mother    Kate Splinter Diabetes Father     Hypertension Father     Hyperlipidemia Brother     Ovarian cancer Paternal Grandmother     Cancer Paternal Uncle     Diabetes Family     Hypertension Family      I have reviewed and agree with the history as documented  Social History     Tobacco Use    Smoking status: Never Smoker    Smokeless tobacco: Never Used   Substance Use Topics    Alcohol use: Yes     Alcohol/week: 1 0 standard drinks     Types: 1 Glasses of wine per week     Frequency: 2-4 times a month     Drinks per session: 1 or 2     Binge frequency: Never    Drug use: No        Review of Systems   Constitutional: Positive for appetite change, chills, diaphoresis and fatigue  Negative for fever  HENT: Positive for congestion and rhinorrhea  Negative for ear pain and sore throat  Respiratory: Negative for cough and shortness of breath  Cardiovascular: Negative for leg swelling  Gastrointestinal: Positive for abdominal pain, diarrhea, nausea and vomiting  Musculoskeletal: Positive for myalgias  Skin: Negative for rash  Allergic/Immunologic: Positive for environmental allergies  Neurological: Positive for headaches  All other systems reviewed and are negative  Physical Exam  Physical Exam   Constitutional: She is oriented to person, place, and time  She appears well-developed and well-nourished  She appears distressed  HENT:   Right Ear: External ear normal    Left Ear: External ear normal    Nose: Mucosal edema present  Right sinus exhibits frontal sinus tenderness (overlying warmth and erythema)  Left sinus exhibits frontal sinus tenderness (overlying warmth and erythema)  Mouth/Throat: Uvula is midline and oropharynx is clear and moist  Mucous membranes are not dry and not cyanotic  No posterior oropharyngeal edema or posterior oropharyngeal erythema  Eyes: EOM are normal    Neck: Neck supple  No tracheal deviation present     Cardiovascular: Normal rate, regular rhythm and normal heart sounds  No murmur heard  Pulmonary/Chest: Effort normal and breath sounds normal  No stridor  No respiratory distress  She has no wheezes  Abdominal: Soft  Bowel sounds are normal  She exhibits no distension  There is tenderness (tenderness in the umbilical and hypogastric region)  There is guarding  Musculoskeletal: Normal range of motion  She exhibits no edema or tenderness (no calf swelling or tenderness)  Lymphadenopathy:     She has no cervical adenopathy  Neurological: She is alert and oriented to person, place, and time  Skin: Skin is warm and dry  Capillary refill takes less than 2 seconds  Psychiatric: She has a normal mood and affect  Her behavior is normal    Nursing note and vitals reviewed        Vital Signs  ED Triage Vitals   Temperature Pulse Respirations Blood Pressure SpO2   11/27/19 1518 11/27/19 1518 11/27/19 1518 11/27/19 1518 11/27/19 1518   98 1 °F (36 7 °C) 95 16 117/70 96 %      Temp Source Heart Rate Source Patient Position - Orthostatic VS BP Location FiO2 (%)   11/27/19 1518 11/27/19 1518 11/27/19 1720 11/27/19 1518 --   Oral Monitor Lying Right arm       Pain Score       11/27/19 1613       4           Vitals:    11/27/19 1518 11/27/19 1720   BP: 117/70 101/55   Pulse: 95 78   Patient Position - Orthostatic VS:  Lying         Visual Acuity      ED Medications  Medications   sodium chloride 0 9 % bolus 1,000 mL (1,000 mL Intravenous New Bag 11/27/19 1611)   ondansetron (ZOFRAN) injection 4 mg (4 mg Intravenous Given 11/27/19 1613)   ketorolac (TORADOL) injection 9 9 mg (9 9 mg Intravenous Given 11/27/19 1613)   iohexol (OMNIPAQUE) 350 MG/ML injection (MULTI-DOSE) 100 mL (100 mL Intravenous Given 11/27/19 1638)       Diagnostic Studies  Results Reviewed     Procedure Component Value Units Date/Time    CBC and differential [[id number]] Collected:  11/27/19 1611    Lab Status:  Final result Specimen:  Blood from Arm, Right Updated:  11/27/19 1742     WBC 9 98 Thousand/uL RBC 4 42 Million/uL      Hemoglobin 13 3 g/dL      Hematocrit 39 7 %      MCV 90 fL      MCH 30 1 pg      MCHC 33 5 g/dL      RDW 11 9 %      MPV 9 0 fL      Platelets 523 Thousands/uL      nRBC 0 /100 WBCs     Narrative: This is an appended report  These results have been appended to a previously verified report  Comprehensive metabolic panel [[id number]] Collected:  11/27/19 1611    Lab Status:  Final result Specimen:  Blood from Arm, Right Updated:  11/27/19 2449     Sodium 142 mmol/L      Potassium 3 5 mmol/L      Chloride 105 mmol/L      CO2 28 mmol/L      ANION GAP 9 mmol/L      BUN 7 mg/dL      Creatinine 0 92 mg/dL      Glucose 85 mg/dL      Calcium 8 9 mg/dL      AST 23 U/L      ALT 29 U/L      Alkaline Phosphatase 106 U/L      Total Protein 7 6 g/dL      Albumin 3 6 g/dL      Total Bilirubin 0 28 mg/dL      eGFR 83 ml/min/1 73sq m     Narrative:       [place] guidelines for Chronic Kidney Disease (CKD):     Stage 1 with normal or high GFR (GFR > 90 mL/min/1 73 square meters)    Stage 2 Mild CKD (GFR = 60-89 mL/min/1 73 square meters)    Stage 3A Moderate CKD (GFR = 45-59 mL/min/1 73 square meters)    Stage 3B Moderate CKD (GFR = 30-44 mL/min/1 73 square meters)    Stage 4 Severe CKD (GFR = 15-29 mL/min/1 73 square meters)    Stage 5 End Stage CKD (GFR <15 mL/min/1 73 square meters)  Note: GFR calculation is accurate only with a steady state creatinine                 CT abdomen pelvis with contrast   Final Result by Chacorta Keating MD (11/27 1653)      No acute inflammatory changes in the abdomen or pelvis  Workstation performed: [id number]                    Procedures  Procedures       ED Course       4:00 PM Patient examined, care discussed with attending provider  Orders placed for labs and CT Abd/Pelvis  Patient given 1L bolus of fluids, zofran, and Toradol  Will reassess  5:10 PM Patient reassessed, resting comfortably   She reports improvement of symptoms, especially nausea  Updated on resulted labs thus far which includes CBC    5:25 PM Labs and imaging reviewed, all within normal limits  CT Abd/Pelvis shows no acute inflammatory changes  Labs show no anemia, electrolyte disturbance, or leukocytosis  Patient advised of these findings and plan for discharge with Zofran  RTED precautions given including fevers, severe or intractable abdominal pain, vomiting, or diarrhea  She is understanding and agreeable with plan of care  Zofran sent to patient's pharmacy  Stable for discharge  MDM  Number of Diagnoses or Management Options  Vomiting and diarrhea: new and requires workup  Diagnosis management comments:     Differential diagnosis includes but not limited to: gastroenteritis, viral syndrome, dehydration, electrolyte disturbance, colitis, obstruction       Amount and/or Complexity of Data Reviewed  Clinical lab tests: ordered and reviewed  Tests in the radiology section of CPT®: ordered and reviewed  Review and summarize past medical records: yes  Discuss the patient with other providers: yes    Risk of Complications, Morbidity, and/or Mortality  Presenting problems: low  Diagnostic procedures: low  Management options: low  General comments: Patient is an otherwise healthy 29-year-old female presenting for evaluation of diarrhea x5 days vomiting x1 day  Patient states she was seen at an urgent care yesterday for her complaint of diarrhea was started ciprofloxacin and diphenoxylate  She states that today she has had improvement of diarrhea however she developed the onset of vomiting and inability to tolerate p o  Today  Given abdominal tenderness on exam and duration of symptoms, labs and CT abdomen pelvis ordered which were unremarkable  Patient was made aware of these findings and discharge plan  Patient reported improvement of symptoms after being given Zofran Toradol, and fluids    Patient advised she will be discharged with Zofran which she is to continue taking for nausea, as well as motrin for body aches, and Lomotil as prescribed for her diarrhea  Patient also encouraged to hydrate as tolerated and eat bland foods for the next couple of days  Recommended discontinuation of Ciprofloxacin as that may be contributing to her symptoms  RTED precautions given  Patient comfortable and agreeable with discharge plan  Stable for discharge  Patient Progress  Patient progress: stable      Disposition  Final diagnoses:   Vomiting and diarrhea     Time reflects when diagnosis was documented in both MDM as applicable and the Disposition within this note     Time User Action Codes Description Comment    11/27/2019  5:39 PM Margette [place] Add [R11 10,  R19 7] Vomiting and diarrhea     11/27/2019  5:40 PM Margette [place] Add [R11 2,  R19 7] Nausea vomiting and diarrhea     11/27/2019  5:41 PM Margette [place] Remove [R11 2,  R19 7] Nausea vomiting and diarrhea       ED Disposition     ED Disposition Condition Date/Time Comment    Discharge Stable Wed Nov 27, 2019  5:39 PM Demetri Lim discharge to home/self care  Follow-up Information     Follow up With Specialties Details Why Contact Info Additional Information    Primary Care Provider  Call  As needed      [facility]  If symptoms worsen [address] [address]  [phone] [facility],  [address] [place] [address], [id number]          Patient's Medications   Discharge Prescriptions    ONDANSETRON (ZOFRAN) 4 MG TABLET    Take 1 tablet (4 mg total) by mouth every 6 (six) hours       Start Date: 11/27/2019End Date: --       Order Dose: 4 mg       Quantity: 20 tablet    Refills: 0     No discharge procedures on file      ED Provider  Electronically Signed by           Kit Bradford PA-C  11/27/19 3442

## 2019-11-29 ENCOUNTER — VBI (OUTPATIENT)
Dept: ADMINISTRATIVE | Facility: OTHER | Age: 34
End: 2019-11-29

## 2019-11-29 NOTE — ED ATTENDING ATTESTATION
11/27/2019  I, Jose Umanzor MD, saw and evaluated the patient  I have discussed the patient with the resident/non-physician practitioner and agree with the resident's/non-physician practitioner's findings, Plan of Care, and MDM as documented in the resident's/non-physician practitioner's note, except where noted  All available labs and Radiology studies were reviewed  I was present for key portions of any procedure(s) performed by the resident/non-physician practitioner and I was immediately available to provide assistance  At this point I agree with the current assessment done in the Emergency Department    I have conducted an independent evaluation of this patient a history and physical is as follows:    ED Course         Critical Care Time  Procedures

## 2020-03-18 ENCOUNTER — ANNUAL EXAM (OUTPATIENT)
Dept: OBGYN CLINIC | Facility: CLINIC | Age: 35
End: 2020-03-18
Payer: COMMERCIAL

## 2020-03-18 VITALS
SYSTOLIC BLOOD PRESSURE: 112 MMHG | WEIGHT: 164 LBS | DIASTOLIC BLOOD PRESSURE: 74 MMHG | BODY MASS INDEX: 24.86 KG/M2 | HEIGHT: 68 IN

## 2020-03-18 DIAGNOSIS — R23.4 CHANGE OF SKIN OF BREAST: ICD-10-CM

## 2020-03-18 DIAGNOSIS — Z11.3 SCREENING FOR STD (SEXUALLY TRANSMITTED DISEASE): ICD-10-CM

## 2020-03-18 DIAGNOSIS — Z01.419 ENCOUNTER FOR GYNECOLOGICAL EXAMINATION (GENERAL) (ROUTINE) WITHOUT ABNORMAL FINDINGS: Primary | ICD-10-CM

## 2020-03-18 DIAGNOSIS — Z71.85 HPV VACCINE COUNSELING: ICD-10-CM

## 2020-03-18 DIAGNOSIS — N63.20 LEFT BREAST LUMP: ICD-10-CM

## 2020-03-18 PROBLEM — R30.0 DYSURIA: Status: RESOLVED | Noted: 2019-05-15 | Resolved: 2020-03-18

## 2020-03-18 PROBLEM — N76.0 BACTERIAL VAGINOSIS: Status: RESOLVED | Noted: 2019-05-15 | Resolved: 2020-03-18

## 2020-03-18 PROBLEM — R87.612 LOW GRADE SQUAMOUS INTRAEPITHELIAL LESION (LGSIL) ON CERVICAL PAP SMEAR: Status: RESOLVED | Noted: 2019-03-04 | Resolved: 2020-03-18

## 2020-03-18 PROBLEM — B96.89 BACTERIAL VAGINOSIS: Status: RESOLVED | Noted: 2019-05-15 | Resolved: 2020-03-18

## 2020-03-18 PROCEDURE — 87624 HPV HI-RISK TYP POOLED RSLT: CPT | Performed by: PHYSICIAN ASSISTANT

## 2020-03-18 PROCEDURE — 87491 CHLMYD TRACH DNA AMP PROBE: CPT | Performed by: PHYSICIAN ASSISTANT

## 2020-03-18 PROCEDURE — G0145 SCR C/V CYTO,THINLAYER,RESCR: HCPCS | Performed by: PHYSICIAN ASSISTANT

## 2020-03-18 PROCEDURE — 87591 N.GONORRHOEAE DNA AMP PROB: CPT | Performed by: PHYSICIAN ASSISTANT

## 2020-03-18 PROCEDURE — 99395 PREV VISIT EST AGE 18-39: CPT | Performed by: PHYSICIAN ASSISTANT

## 2020-03-18 RX ORDER — NYSTATIN 100000 U/G
CREAM TOPICAL 2 TIMES DAILY
Qty: 30 G | Refills: 1 | Status: SHIPPED | OUTPATIENT
Start: 2020-03-18 | End: 2020-07-21 | Stop reason: ALTCHOICE
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
## 2020-03-18 NOTE — ASSESSMENT & PLAN NOTE
The current ASCCP guidelines were reviewed  Patient's last pap was 2/14/19 - LSIL (+) HRHPV (+) type 16 (-) type 18; 3/1/19 - colpo benign; 6/21/19 - s/p LAVH B/L salpingectomy for ERNIE 3 and therefore, a pap with HPV cotesting is indicated of the vaginal cuff for appropriate follow-up at this time  I emphasized the importance of an annual pelvic and breast exam  Patient ok to have a pap done today

## 2020-03-20 LAB
C TRACH DNA SPEC QL NAA+PROBE: NEGATIVE
HPV HR 12 DNA CVX QL NAA+PROBE: NEGATIVE
HPV16 DNA CVX QL NAA+PROBE: NEGATIVE
HPV18 DNA CVX QL NAA+PROBE: NEGATIVE
N GONORRHOEA DNA SPEC QL NAA+PROBE: NEGATIVE

## 2020-03-23 LAB
LAB AP GYN PRIMARY INTERPRETATION: NORMAL
Lab: NORMAL

## 2020-05-18 ENCOUNTER — APPOINTMENT (INPATIENT)
Dept: RADIOLOGY | Facility: HOSPITAL | Age: 35
DRG: 222 | End: 2020-05-18
Payer: OTHER MISCELLANEOUS

## 2020-05-18 ENCOUNTER — HOSPITAL ENCOUNTER (INPATIENT)
Facility: HOSPITAL | Age: 35
LOS: 2 days | Discharge: HOME | DRG: 222 | End: 2020-05-20
Attending: EMERGENCY MEDICINE | Admitting: EMERGENCY MEDICINE
Payer: OTHER MISCELLANEOUS

## 2020-05-18 ENCOUNTER — ANESTHESIA EVENT (INPATIENT)
Dept: PERIOP | Facility: HOSPITAL | Age: 35
DRG: 222 | End: 2020-05-18
Payer: OTHER MISCELLANEOUS

## 2020-05-18 ENCOUNTER — ANESTHESIA (INPATIENT)
Dept: PERIOP | Facility: HOSPITAL | Age: 35
DRG: 222 | End: 2020-05-18
Payer: OTHER MISCELLANEOUS

## 2020-05-18 DIAGNOSIS — S82.401B: ICD-10-CM

## 2020-05-18 DIAGNOSIS — S82.201B: Primary | ICD-10-CM

## 2020-05-18 LAB
ABO GROUP BLD: NORMAL
ANION GAP SERPL CALCULATED.3IONS-SCNC: 6 MMOL/L (ref 4–13)
APTT PPP: 26 SECONDS (ref 23–37)
BASE EXCESS BLDA CALC-SCNC: -1 MMOL/L (ref -2–3)
BASOPHILS # BLD AUTO: 0.06 THOUSANDS/ΜL (ref 0–0.1)
BASOPHILS NFR BLD AUTO: 0 % (ref 0–1)
BLD GP AB SCN SERPL QL: NEGATIVE
BUN SERPL-MCNC: 13 MG/DL (ref 5–25)
CA-I BLD-SCNC: 1.2 MMOL/L (ref 1.12–1.32)
CALCIUM SERPL-MCNC: 8.9 MG/DL (ref 8.3–10.1)
CHLORIDE SERPL-SCNC: 107 MMOL/L (ref 100–108)
CK SERPL-CCNC: 96 U/L (ref 26–192)
CO2 SERPL-SCNC: 24 MMOL/L (ref 21–32)
CREAT SERPL-MCNC: 0.75 MG/DL (ref 0.6–1.3)
EOSINOPHIL # BLD AUTO: 0.11 THOUSAND/ΜL (ref 0–0.61)
EOSINOPHIL NFR BLD AUTO: 1 % (ref 0–6)
ERYTHROCYTE [DISTWIDTH] IN BLOOD BY AUTOMATED COUNT: 13.1 % (ref 11.6–15.1)
GFR SERPL CREATININE-BSD FRML MDRD: 46 ML/MIN/1.73SQ M
GLUCOSE SERPL-MCNC: 87 MG/DL (ref 65–140)
GLUCOSE SERPL-MCNC: 92 MG/DL (ref 65–140)
HCO3 BLDA-SCNC: 24.1 MMOL/L (ref 24–30)
HCT VFR BLD AUTO: 41.4 % (ref 34.8–46.1)
HCT VFR BLD CALC: 41 % (ref 34.8–46.1)
HGB BLD-MCNC: 13.7 G/DL (ref 11.5–15.4)
HGB BLDA-MCNC: 13.9 G/DL (ref 11.5–15.4)
IMM GRANULOCYTES # BLD AUTO: 0.15 THOUSAND/UL (ref 0–0.2)
IMM GRANULOCYTES NFR BLD AUTO: 1 % (ref 0–2)
INR PPP: 0.92 (ref 0.84–1.19)
LYMPHOCYTES # BLD AUTO: 3.05 THOUSANDS/ΜL (ref 0.6–4.47)
LYMPHOCYTES NFR BLD AUTO: 20 % (ref 14–44)
MCH RBC QN AUTO: 30.4 PG (ref 26.8–34.3)
MCHC RBC AUTO-ENTMCNC: 33.1 G/DL (ref 31.4–37.4)
MCV RBC AUTO: 92 FL (ref 82–98)
MONOCYTES # BLD AUTO: 0.94 THOUSAND/ΜL (ref 0.17–1.22)
MONOCYTES NFR BLD AUTO: 6 % (ref 4–12)
NEUTROPHILS # BLD AUTO: 10.67 THOUSANDS/ΜL (ref 1.85–7.62)
NEUTS SEG NFR BLD AUTO: 72 % (ref 43–75)
NRBC BLD AUTO-RTO: 0 /100 WBCS
PCO2 BLD: 25 MMOL/L (ref 21–32)
PCO2 BLD: 41.6 MM HG (ref 42–50)
PH BLD: 7.37 [PH] (ref 7.3–7.4)
PLATELET # BLD AUTO: 333 THOUSANDS/UL (ref 149–390)
PMV BLD AUTO: 9.7 FL (ref 8.9–12.7)
PO2 BLD: 30 MM HG (ref 35–45)
POTASSIUM BLD-SCNC: 3.7 MMOL/L (ref 3.5–5.3)
POTASSIUM SERPL-SCNC: 3.6 MMOL/L (ref 3.5–5.3)
PROTHROMBIN TIME: 12 SECONDS (ref 11.6–14.5)
RBC # BLD AUTO: 4.5 MILLION/UL (ref 3.81–5.12)
RH BLD: POSITIVE
SAO2 % BLD FROM PO2: 55 % (ref 60–85)
SARS-COV-2 RNA RESP QL NAA+PROBE: NEGATIVE
SODIUM BLD-SCNC: 137 MMOL/L (ref 136–145)
SODIUM SERPL-SCNC: 137 MMOL/L (ref 136–145)
SPECIMEN EXPIRATION DATE: NORMAL
SPECIMEN SOURCE: ABNORMAL
WBC # BLD AUTO: 14.98 THOUSAND/UL (ref 4.31–10.16)

## 2020-05-18 PROCEDURE — C1713 ANCHOR/SCREW BN/BN,TIS/BN: HCPCS | Performed by: ORTHOPAEDIC SURGERY

## 2020-05-18 PROCEDURE — 82947 ASSAY GLUCOSE BLOOD QUANT: CPT

## 2020-05-18 PROCEDURE — 86901 BLOOD TYPING SEROLOGIC RH(D): CPT | Performed by: ORTHOPAEDIC SURGERY

## 2020-05-18 PROCEDURE — 73590 X-RAY EXAM OF LOWER LEG: CPT

## 2020-05-18 PROCEDURE — 85014 HEMATOCRIT: CPT

## 2020-05-18 PROCEDURE — 85610 PROTHROMBIN TIME: CPT | Performed by: ORTHOPAEDIC SURGERY

## 2020-05-18 PROCEDURE — 27759 TREATMENT OF TIBIA FRACTURE: CPT | Performed by: ORTHOPAEDIC SURGERY

## 2020-05-18 PROCEDURE — 85025 COMPLETE CBC W/AUTO DIFF WBC: CPT | Performed by: EMERGENCY MEDICINE

## 2020-05-18 PROCEDURE — 29515 APPLICATION SHORT LEG SPLINT: CPT | Performed by: SURGERY

## 2020-05-18 PROCEDURE — 99285 EMERGENCY DEPT VISIT HI MDM: CPT

## 2020-05-18 PROCEDURE — NC001 PR NO CHARGE: Performed by: SURGERY

## 2020-05-18 PROCEDURE — NC001 PR NO CHARGE: Performed by: EMERGENCY MEDICINE

## 2020-05-18 PROCEDURE — 86900 BLOOD TYPING SEROLOGIC ABO: CPT | Performed by: ORTHOPAEDIC SURGERY

## 2020-05-18 PROCEDURE — U0003 INFECTIOUS AGENT DETECTION BY NUCLEIC ACID (DNA OR RNA); SEVERE ACUTE RESPIRATORY SYNDROME CORONAVIRUS 2 (SARS-COV-2) (CORONAVIRUS DISEASE [COVID-19]), AMPLIFIED PROBE TECHNIQUE, MAKING USE OF HIGH THROUGHPUT TECHNOLOGIES AS DESCRIBED BY CMS-2020-01-R: HCPCS | Performed by: STUDENT IN AN ORGANIZED HEALTH CARE EDUCATION/TRAINING PROGRAM

## 2020-05-18 PROCEDURE — 96368 THER/DIAG CONCURRENT INF: CPT

## 2020-05-18 PROCEDURE — 90715 TDAP VACCINE 7 YRS/> IM: CPT

## 2020-05-18 PROCEDURE — 0S9C0ZZ DRAINAGE OF RIGHT KNEE JOINT, OPEN APPROACH: ICD-10-PCS | Performed by: ORTHOPAEDIC SURGERY

## 2020-05-18 PROCEDURE — 99223 1ST HOSP IP/OBS HIGH 75: CPT | Performed by: ORTHOPAEDIC SURGERY

## 2020-05-18 PROCEDURE — C1769 GUIDE WIRE: HCPCS | Performed by: ORTHOPAEDIC SURGERY

## 2020-05-18 PROCEDURE — 0QSGXZZ REPOSITION RIGHT TIBIA, EXTERNAL APPROACH: ICD-10-PCS | Performed by: EMERGENCY MEDICINE

## 2020-05-18 PROCEDURE — 86850 RBC ANTIBODY SCREEN: CPT | Performed by: ORTHOPAEDIC SURGERY

## 2020-05-18 PROCEDURE — 0QSG06Z REPOSITION RIGHT TIBIA WITH INTRAMEDULLARY INTERNAL FIXATION DEVICE, OPEN APPROACH: ICD-10-PCS | Performed by: ORTHOPAEDIC SURGERY

## 2020-05-18 PROCEDURE — 96365 THER/PROPH/DIAG IV INF INIT: CPT

## 2020-05-18 PROCEDURE — 36415 COLL VENOUS BLD VENIPUNCTURE: CPT

## 2020-05-18 PROCEDURE — 70450 CT HEAD/BRAIN W/O DYE: CPT

## 2020-05-18 PROCEDURE — 82550 ASSAY OF CK (CPK): CPT | Performed by: EMERGENCY MEDICINE

## 2020-05-18 PROCEDURE — 84295 ASSAY OF SERUM SODIUM: CPT

## 2020-05-18 PROCEDURE — 80048 BASIC METABOLIC PNL TOTAL CA: CPT | Performed by: EMERGENCY MEDICINE

## 2020-05-18 PROCEDURE — 84132 ASSAY OF SERUM POTASSIUM: CPT

## 2020-05-18 PROCEDURE — 82803 BLOOD GASES ANY COMBINATION: CPT

## 2020-05-18 PROCEDURE — 99222 1ST HOSP IP/OBS MODERATE 55: CPT | Performed by: SURGERY

## 2020-05-18 PROCEDURE — 90471 IMMUNIZATION ADMIN: CPT

## 2020-05-18 PROCEDURE — 82330 ASSAY OF CALCIUM: CPT

## 2020-05-18 PROCEDURE — 0QSJXZZ REPOSITION RIGHT FIBULA, EXTERNAL APPROACH: ICD-10-PCS | Performed by: EMERGENCY MEDICINE

## 2020-05-18 PROCEDURE — 72125 CT NECK SPINE W/O DYE: CPT

## 2020-05-18 PROCEDURE — 85730 THROMBOPLASTIN TIME PARTIAL: CPT | Performed by: ORTHOPAEDIC SURGERY

## 2020-05-18 PROCEDURE — 11010 DEBRIDE SKIN AT FX SITE: CPT | Performed by: ORTHOPAEDIC SURGERY

## 2020-05-18 DEVICE — 5.0MM TI LOCKING SCREW W/T25 STARDRIVE 40MM F/IM NAIL-STER
Type: IMPLANTABLE DEVICE | Site: LEG | Status: NON-FUNCTIONAL
Removed: 2022-03-23

## 2020-05-18 DEVICE — 10MM TI CANN TIBIAL NAIL-EX W/PROX BEND 345MM-STERILE
Type: IMPLANTABLE DEVICE | Site: LEG | Status: FUNCTIONAL
Brand: EXPERT NAIL

## 2020-05-18 DEVICE — 5.0MM TI LOCKING SCREW W/T25 STARDRIVE 32MM F/IM NAIL-STER: Type: IMPLANTABLE DEVICE | Site: LEG | Status: FUNCTIONAL

## 2020-05-18 DEVICE — 5.0MM TI LOCKING SCREW W/T25 STARDRIVE 34MM F/IM NAIL-STER: Type: IMPLANTABLE DEVICE | Site: LEG | Status: FUNCTIONAL

## 2020-05-18 RX ORDER — LIDOCAINE HYDROCHLORIDE 10 MG/ML
INJECTION, SOLUTION EPIDURAL; INFILTRATION; INTRACAUDAL; PERINEURAL AS NEEDED
Status: DISCONTINUED | OUTPATIENT
Start: 2020-05-18 | End: 2020-05-18 | Stop reason: SURG

## 2020-05-18 RX ORDER — METHOCARBAMOL 750 MG/1
750 TABLET, FILM COATED ORAL EVERY 6 HOURS SCHEDULED
Status: DISCONTINUED | OUTPATIENT
Start: 2020-05-18 | End: 2020-05-20 | Stop reason: HOSPADM

## 2020-05-18 RX ORDER — GABAPENTIN 100 MG/1
100 CAPSULE ORAL 3 TIMES DAILY
Status: DISCONTINUED | OUTPATIENT
Start: 2020-05-18 | End: 2020-05-20 | Stop reason: HOSPADM

## 2020-05-18 RX ORDER — SODIUM CHLORIDE 9 MG/ML
INJECTION, SOLUTION INTRAVENOUS CONTINUOUS PRN
Status: DISCONTINUED | OUTPATIENT
Start: 2020-05-18 | End: 2020-05-18 | Stop reason: SURG

## 2020-05-18 RX ORDER — ONDANSETRON 2 MG/ML
4 INJECTION INTRAMUSCULAR; INTRAVENOUS ONCE AS NEEDED
Status: DISCONTINUED | OUTPATIENT
Start: 2020-05-18 | End: 2020-05-18 | Stop reason: HOSPADM

## 2020-05-18 RX ORDER — ROCURONIUM BROMIDE 10 MG/ML
INJECTION, SOLUTION INTRAVENOUS AS NEEDED
Status: DISCONTINUED | OUTPATIENT
Start: 2020-05-18 | End: 2020-05-18 | Stop reason: SURG

## 2020-05-18 RX ORDER — ALBUTEROL SULFATE 90 UG/1
2 AEROSOL, METERED RESPIRATORY (INHALATION) EVERY 6 HOURS PRN
COMMUNITY
End: 2022-01-14 | Stop reason: SDUPTHER

## 2020-05-18 RX ORDER — KETAMINE HYDROCHLORIDE 50 MG/ML
80 INJECTION, SOLUTION, CONCENTRATE INTRAMUSCULAR; INTRAVENOUS ONCE
Status: COMPLETED | OUTPATIENT
Start: 2020-05-18 | End: 2020-05-18

## 2020-05-18 RX ORDER — HYDROMORPHONE HCL/PF 1 MG/ML
0.4 SYRINGE (ML) INJECTION
Status: COMPLETED | OUTPATIENT
Start: 2020-05-18 | End: 2020-05-18

## 2020-05-18 RX ORDER — MAGNESIUM HYDROXIDE 1200 MG/15ML
LIQUID ORAL AS NEEDED
Status: DISCONTINUED | OUTPATIENT
Start: 2020-05-18 | End: 2020-05-18 | Stop reason: HOSPADM

## 2020-05-18 RX ORDER — SODIUM CHLORIDE, SODIUM LACTATE, POTASSIUM CHLORIDE, CALCIUM CHLORIDE 600; 310; 30; 20 MG/100ML; MG/100ML; MG/100ML; MG/100ML
INJECTION, SOLUTION INTRAVENOUS CONTINUOUS PRN
Status: DISCONTINUED | OUTPATIENT
Start: 2020-05-18 | End: 2020-05-18

## 2020-05-18 RX ORDER — SODIUM CHLORIDE, SODIUM GLUCONATE, SODIUM ACETATE, POTASSIUM CHLORIDE, MAGNESIUM CHLORIDE, SODIUM PHOSPHATE, DIBASIC, AND POTASSIUM PHOSPHATE .53; .5; .37; .037; .03; .012; .00082 G/100ML; G/100ML; G/100ML; G/100ML; G/100ML; G/100ML; G/100ML
125 INJECTION, SOLUTION INTRAVENOUS CONTINUOUS
Status: DISCONTINUED | OUTPATIENT
Start: 2020-05-18 | End: 2020-05-19

## 2020-05-18 RX ORDER — HYDROMORPHONE HCL/PF 1 MG/ML
1 SYRINGE (ML) INJECTION ONCE
Status: COMPLETED | OUTPATIENT
Start: 2020-05-18 | End: 2020-05-18

## 2020-05-18 RX ORDER — SUCCINYLCHOLINE/SOD CL,ISO/PF 100 MG/5ML
SYRINGE (ML) INTRAVENOUS AS NEEDED
Status: DISCONTINUED | OUTPATIENT
Start: 2020-05-18 | End: 2020-05-18 | Stop reason: SURG

## 2020-05-18 RX ORDER — HYDROMORPHONE HCL/PF 1 MG/ML
0.5 SYRINGE (ML) INJECTION
Status: DISCONTINUED | OUTPATIENT
Start: 2020-05-18 | End: 2020-05-20 | Stop reason: HOSPADM

## 2020-05-18 RX ORDER — FENTANYL CITRATE 50 UG/ML
INJECTION, SOLUTION INTRAMUSCULAR; INTRAVENOUS CODE/TRAUMA/SEDATION MEDICATION
Status: COMPLETED | OUTPATIENT
Start: 2020-05-18 | End: 2020-05-18

## 2020-05-18 RX ORDER — ONDANSETRON 2 MG/ML
INJECTION INTRAMUSCULAR; INTRAVENOUS AS NEEDED
Status: DISCONTINUED | OUTPATIENT
Start: 2020-05-18 | End: 2020-05-18 | Stop reason: SURG

## 2020-05-18 RX ORDER — BUDESONIDE AND FORMOTEROL FUMARATE DIHYDRATE 80; 4.5 UG/1; UG/1
AEROSOL RESPIRATORY (INHALATION) 2 TIMES DAILY
COMMUNITY
End: 2020-07-21

## 2020-05-18 RX ORDER — PROPOFOL 10 MG/ML
INJECTION, EMULSION INTRAVENOUS AS NEEDED
Status: DISCONTINUED | OUTPATIENT
Start: 2020-05-18 | End: 2020-05-18 | Stop reason: SURG

## 2020-05-18 RX ORDER — DEXAMETHASONE SODIUM PHOSPHATE 10 MG/ML
INJECTION, SOLUTION INTRAMUSCULAR; INTRAVENOUS AS NEEDED
Status: DISCONTINUED | OUTPATIENT
Start: 2020-05-18 | End: 2020-05-18 | Stop reason: SURG

## 2020-05-18 RX ORDER — CEFAZOLIN SODIUM 2 G/50ML
2000 SOLUTION INTRAVENOUS ONCE
Status: COMPLETED | OUTPATIENT
Start: 2020-05-18 | End: 2020-05-18

## 2020-05-18 RX ORDER — ACETAMINOPHEN 325 MG/1
975 TABLET ORAL EVERY 8 HOURS SCHEDULED
Status: DISCONTINUED | OUTPATIENT
Start: 2020-05-18 | End: 2020-05-20 | Stop reason: HOSPADM

## 2020-05-18 RX ORDER — KETAMINE HYDROCHLORIDE 50 MG/ML
80 INJECTION, SOLUTION, CONCENTRATE INTRAMUSCULAR; INTRAVENOUS ONCE
Status: DISCONTINUED | OUTPATIENT
Start: 2020-05-18 | End: 2020-05-18

## 2020-05-18 RX ORDER — GLYCOPYRROLATE 0.2 MG/ML
INJECTION INTRAMUSCULAR; INTRAVENOUS AS NEEDED
Status: DISCONTINUED | OUTPATIENT
Start: 2020-05-18 | End: 2020-05-18 | Stop reason: SURG

## 2020-05-18 RX ORDER — OXYCODONE HYDROCHLORIDE 10 MG/1
10 TABLET ORAL EVERY 4 HOURS PRN
Status: DISCONTINUED | OUTPATIENT
Start: 2020-05-18 | End: 2020-05-20 | Stop reason: HOSPADM

## 2020-05-18 RX ORDER — NEOSTIGMINE METHYLSULFATE 1 MG/ML
INJECTION INTRAVENOUS AS NEEDED
Status: DISCONTINUED | OUTPATIENT
Start: 2020-05-18 | End: 2020-05-18 | Stop reason: SURG

## 2020-05-18 RX ORDER — MIDAZOLAM HYDROCHLORIDE 2 MG/2ML
INJECTION, SOLUTION INTRAMUSCULAR; INTRAVENOUS AS NEEDED
Status: DISCONTINUED | OUTPATIENT
Start: 2020-05-18 | End: 2020-05-18 | Stop reason: SURG

## 2020-05-18 RX ORDER — ALBUMIN, HUMAN INJ 5% 5 %
SOLUTION INTRAVENOUS CONTINUOUS PRN
Status: DISCONTINUED | OUTPATIENT
Start: 2020-05-18 | End: 2020-05-18

## 2020-05-18 RX ORDER — CEFAZOLIN SODIUM 2 G/50ML
2000 SOLUTION INTRAVENOUS EVERY 8 HOURS
Status: DISCONTINUED | OUTPATIENT
Start: 2020-05-18 | End: 2020-05-20

## 2020-05-18 RX ORDER — AMOXICILLIN 250 MG
2 CAPSULE ORAL DAILY
Status: DISCONTINUED | OUTPATIENT
Start: 2020-05-19 | End: 2020-05-20 | Stop reason: HOSPADM

## 2020-05-18 RX ORDER — ONDANSETRON 2 MG/ML
4 INJECTION INTRAMUSCULAR; INTRAVENOUS EVERY 6 HOURS PRN
Status: DISCONTINUED | OUTPATIENT
Start: 2020-05-18 | End: 2020-05-20 | Stop reason: HOSPADM

## 2020-05-18 RX ORDER — FENTANYL CITRATE 50 UG/ML
INJECTION, SOLUTION INTRAMUSCULAR; INTRAVENOUS AS NEEDED
Status: DISCONTINUED | OUTPATIENT
Start: 2020-05-18 | End: 2020-05-18 | Stop reason: SURG

## 2020-05-18 RX ORDER — CEFAZOLIN SODIUM 2 G/50ML
2000 SOLUTION INTRAVENOUS EVERY 8 HOURS
Status: DISCONTINUED | OUTPATIENT
Start: 2020-05-18 | End: 2020-05-18

## 2020-05-18 RX ORDER — ESCITALOPRAM OXALATE 10 MG/1
TABLET ORAL DAILY
COMMUNITY
End: 2020-07-21

## 2020-05-18 RX ORDER — SODIUM CHLORIDE, SODIUM LACTATE, POTASSIUM CHLORIDE, CALCIUM CHLORIDE 600; 310; 30; 20 MG/100ML; MG/100ML; MG/100ML; MG/100ML
75 INJECTION, SOLUTION INTRAVENOUS CONTINUOUS
Status: DISCONTINUED | OUTPATIENT
Start: 2020-05-18 | End: 2020-05-19

## 2020-05-18 RX ORDER — ALPRAZOLAM 0.25 MG/1
0.25 TABLET ORAL DAILY PRN
COMMUNITY
End: 2020-07-21 | Stop reason: ALTCHOICE

## 2020-05-18 RX ORDER — FENTANYL CITRATE/PF 50 MCG/ML
25 SYRINGE (ML) INJECTION
Status: DISCONTINUED | OUTPATIENT
Start: 2020-05-18 | End: 2020-05-18 | Stop reason: HOSPADM

## 2020-05-18 RX ORDER — OXYCODONE HYDROCHLORIDE 5 MG/1
5 TABLET ORAL EVERY 4 HOURS PRN
Status: DISCONTINUED | OUTPATIENT
Start: 2020-05-18 | End: 2020-05-20 | Stop reason: HOSPADM

## 2020-05-18 RX ORDER — ALPRAZOLAM 0.5 MG/1
0.25 TABLET ORAL ONCE AS NEEDED
Status: COMPLETED | OUTPATIENT
Start: 2020-05-18 | End: 2020-05-18

## 2020-05-18 RX ORDER — FLUTICASONE PROPIONATE 50 MCG
1 SPRAY, SUSPENSION (ML) NASAL DAILY PRN
COMMUNITY
End: 2020-07-21 | Stop reason: SDUPTHER

## 2020-05-18 RX ORDER — HYDROMORPHONE HCL/PF 1 MG/ML
SYRINGE (ML) INJECTION AS NEEDED
Status: DISCONTINUED | OUTPATIENT
Start: 2020-05-18 | End: 2020-05-18 | Stop reason: SURG

## 2020-05-18 RX ADMIN — CEFAZOLIN SODIUM 2000 MG: 2 SOLUTION INTRAVENOUS at 09:14

## 2020-05-18 RX ADMIN — FENTANYL CITRATE 50 MCG: 50 INJECTION, SOLUTION INTRAMUSCULAR; INTRAVENOUS at 14:49

## 2020-05-18 RX ADMIN — HYDROMORPHONE HYDROCHLORIDE 0.5 MG: 1 INJECTION, SOLUTION INTRAMUSCULAR; INTRAVENOUS; SUBCUTANEOUS at 13:13

## 2020-05-18 RX ADMIN — HYDROMORPHONE HYDROCHLORIDE 0.5 MG: 1 INJECTION, SOLUTION INTRAMUSCULAR; INTRAVENOUS; SUBCUTANEOUS at 16:30

## 2020-05-18 RX ADMIN — PHENYLEPHRINE HYDROCHLORIDE 100 MCG: 10 INJECTION INTRAVENOUS at 14:54

## 2020-05-18 RX ADMIN — DEXAMETHASONE SODIUM PHOSPHATE 5 MG: 10 INJECTION, SOLUTION INTRAMUSCULAR; INTRAVENOUS at 15:15

## 2020-05-18 RX ADMIN — METHOCARBAMOL TABLETS 750 MG: 750 TABLET, COATED ORAL at 12:01

## 2020-05-18 RX ADMIN — ACETAMINOPHEN 975 MG: 325 TABLET ORAL at 22:07

## 2020-05-18 RX ADMIN — HYDROMORPHONE HYDROCHLORIDE 0.4 MG: 1 INJECTION, SOLUTION INTRAMUSCULAR; INTRAVENOUS; SUBCUTANEOUS at 18:32

## 2020-05-18 RX ADMIN — FENTANYL CITRATE 25 MCG: 50 INJECTION, SOLUTION INTRAMUSCULAR; INTRAVENOUS at 17:12

## 2020-05-18 RX ADMIN — MIDAZOLAM 2 MG: 1 INJECTION INTRAMUSCULAR; INTRAVENOUS at 14:43

## 2020-05-18 RX ADMIN — OXYCODONE HYDROCHLORIDE 10 MG: 10 TABLET ORAL at 20:47

## 2020-05-18 RX ADMIN — ROCURONIUM BROMIDE 10 MG: 10 INJECTION, SOLUTION INTRAVENOUS at 14:49

## 2020-05-18 RX ADMIN — FENTANYL CITRATE 25 MCG: 50 INJECTION, SOLUTION INTRAMUSCULAR; INTRAVENOUS at 17:14

## 2020-05-18 RX ADMIN — FENTANYL CITRATE 50 MCG: 50 INJECTION, SOLUTION INTRAMUSCULAR; INTRAVENOUS at 17:16

## 2020-05-18 RX ADMIN — GLYCOPYRROLATE 0.4 MG: 0.2 INJECTION, SOLUTION INTRAMUSCULAR; INTRAVENOUS at 16:59

## 2020-05-18 RX ADMIN — PHENYLEPHRINE HYDROCHLORIDE 20 MCG/MIN: 10 INJECTION INTRAVENOUS at 15:57

## 2020-05-18 RX ADMIN — FENTANYL CITRATE 50 MCG: 50 INJECTION, SOLUTION INTRAMUSCULAR; INTRAVENOUS at 15:32

## 2020-05-18 RX ADMIN — SODIUM CHLORIDE 500 ML: 0.9 INJECTION, SOLUTION INTRAVENOUS at 09:24

## 2020-05-18 RX ADMIN — ROCURONIUM BROMIDE 10 MG: 10 INJECTION, SOLUTION INTRAVENOUS at 16:15

## 2020-05-18 RX ADMIN — FENTANYL CITRATE 25 MCG: 0.05 INJECTION, SOLUTION INTRAMUSCULAR; INTRAVENOUS at 17:46

## 2020-05-18 RX ADMIN — HYDROMORPHONE HYDROCHLORIDE 0.5 MG: 1 INJECTION, SOLUTION INTRAMUSCULAR; INTRAVENOUS; SUBCUTANEOUS at 22:48

## 2020-05-18 RX ADMIN — FENTANYL CITRATE 25 MCG: 0.05 INJECTION, SOLUTION INTRAMUSCULAR; INTRAVENOUS at 17:41

## 2020-05-18 RX ADMIN — ALBUMIN (HUMAN): 12.5 SOLUTION INTRAVENOUS at 16:45

## 2020-05-18 RX ADMIN — FENTANYL CITRATE 100 MCG: 50 INJECTION, SOLUTION INTRAMUSCULAR; INTRAVENOUS at 09:34

## 2020-05-18 RX ADMIN — SODIUM CHLORIDE, SODIUM LACTATE, POTASSIUM CHLORIDE, AND CALCIUM CHLORIDE: .6; .31; .03; .02 INJECTION, SOLUTION INTRAVENOUS at 14:38

## 2020-05-18 RX ADMIN — TETANUS TOXOID, REDUCED DIPHTHERIA TOXOID AND ACELLULAR PERTUSSIS VACCINE, ADSORBED 0.5 ML: 5; 2.5; 8; 8; 2.5 SUSPENSION INTRAMUSCULAR at 09:16

## 2020-05-18 RX ADMIN — PROPOFOL 200 MG: 10 INJECTION, EMULSION INTRAVENOUS at 14:49

## 2020-05-18 RX ADMIN — ROCURONIUM BROMIDE 10 MG: 10 INJECTION, SOLUTION INTRAVENOUS at 15:31

## 2020-05-18 RX ADMIN — ALBUMIN (HUMAN): 12.5 SOLUTION INTRAVENOUS at 16:19

## 2020-05-18 RX ADMIN — SODIUM CHLORIDE, SODIUM LACTATE, POTASSIUM CHLORIDE, AND CALCIUM CHLORIDE: .6; .31; .03; .02 INJECTION, SOLUTION INTRAVENOUS at 15:20

## 2020-05-18 RX ADMIN — HYDROMORPHONE HYDROCHLORIDE 0.4 MG: 1 INJECTION, SOLUTION INTRAMUSCULAR; INTRAVENOUS; SUBCUTANEOUS at 18:04

## 2020-05-18 RX ADMIN — HYDROMORPHONE HYDROCHLORIDE 0.5 MG: 1 INJECTION, SOLUTION INTRAMUSCULAR; INTRAVENOUS; SUBCUTANEOUS at 16:46

## 2020-05-18 RX ADMIN — SODIUM CHLORIDE, SODIUM LACTATE, POTASSIUM CHLORIDE, AND CALCIUM CHLORIDE 75 ML/HR: .6; .31; .03; .02 INJECTION, SOLUTION INTRAVENOUS at 23:01

## 2020-05-18 RX ADMIN — HYDROMORPHONE HYDROCHLORIDE 1 MG: 1 INJECTION, SOLUTION INTRAMUSCULAR; INTRAVENOUS; SUBCUTANEOUS at 10:45

## 2020-05-18 RX ADMIN — KETAMINE HYDROCHLORIDE 80 MG: 50 INJECTION, SOLUTION INTRAMUSCULAR; INTRAVENOUS at 09:28

## 2020-05-18 RX ADMIN — ROCURONIUM BROMIDE 30 MG: 10 INJECTION, SOLUTION INTRAVENOUS at 15:00

## 2020-05-18 RX ADMIN — PHENYLEPHRINE HYDROCHLORIDE 100 MCG: 10 INJECTION INTRAVENOUS at 15:00

## 2020-05-18 RX ADMIN — CEFAZOLIN SODIUM 2000 MG: 2 SOLUTION INTRAVENOUS at 22:48

## 2020-05-18 RX ADMIN — FENTANYL CITRATE 25 MCG: 0.05 INJECTION, SOLUTION INTRAMUSCULAR; INTRAVENOUS at 17:31

## 2020-05-18 RX ADMIN — HYDROMORPHONE HYDROCHLORIDE 1 MG: 1 INJECTION, SOLUTION INTRAMUSCULAR; INTRAVENOUS; SUBCUTANEOUS at 10:30

## 2020-05-18 RX ADMIN — ACETAMINOPHEN 975 MG: 325 TABLET ORAL at 13:12

## 2020-05-18 RX ADMIN — HYDROMORPHONE HYDROCHLORIDE 0.4 MG: 1 INJECTION, SOLUTION INTRAMUSCULAR; INTRAVENOUS; SUBCUTANEOUS at 18:25

## 2020-05-18 RX ADMIN — PHENYLEPHRINE HYDROCHLORIDE 100 MCG: 10 INJECTION INTRAVENOUS at 15:27

## 2020-05-18 RX ADMIN — PHENYLEPHRINE HYDROCHLORIDE 200 MCG: 10 INJECTION INTRAVENOUS at 15:48

## 2020-05-18 RX ADMIN — GABAPENTIN 100 MG: 100 CAPSULE ORAL at 20:47

## 2020-05-18 RX ADMIN — ONDANSETRON 4 MG: 2 INJECTION INTRAMUSCULAR; INTRAVENOUS at 15:15

## 2020-05-18 RX ADMIN — SODIUM CHLORIDE: 0.9 INJECTION, SOLUTION INTRAVENOUS at 15:10

## 2020-05-18 RX ADMIN — ALBUMIN (HUMAN): 12.5 SOLUTION INTRAVENOUS at 16:30

## 2020-05-18 RX ADMIN — HYDROMORPHONE HYDROCHLORIDE 0.5 MG: 1 INJECTION, SOLUTION INTRAMUSCULAR; INTRAVENOUS; SUBCUTANEOUS at 17:23

## 2020-05-18 RX ADMIN — SODIUM CHLORIDE, SODIUM GLUCONATE, SODIUM ACETATE, POTASSIUM CHLORIDE, MAGNESIUM CHLORIDE, SODIUM PHOSPHATE, DIBASIC, AND POTASSIUM PHOSPHATE 125 ML/HR: .53; .5; .37; .037; .03; .012; .00082 INJECTION, SOLUTION INTRAVENOUS at 12:01

## 2020-05-18 RX ADMIN — NEOSTIGMINE METHYLSULFATE 3 MG: 1 INJECTION, SOLUTION INTRAVENOUS at 16:59

## 2020-05-18 RX ADMIN — FENTANYL CITRATE 25 MCG: 0.05 INJECTION, SOLUTION INTRAMUSCULAR; INTRAVENOUS at 17:36

## 2020-05-18 RX ADMIN — LIDOCAINE HYDROCHLORIDE 50 MG: 10 INJECTION, SOLUTION EPIDURAL; INFILTRATION; INTRACAUDAL; PERINEURAL at 14:49

## 2020-05-18 RX ADMIN — ALPRAZOLAM 0.25 MG: 0.5 TABLET ORAL at 18:01

## 2020-05-18 RX ADMIN — CEFAZOLIN SODIUM 2000 MG: 2 SOLUTION INTRAVENOUS at 14:54

## 2020-05-18 RX ADMIN — HYDROMORPHONE HYDROCHLORIDE 0.4 MG: 1 INJECTION, SOLUTION INTRAMUSCULAR; INTRAVENOUS; SUBCUTANEOUS at 17:53

## 2020-05-18 RX ADMIN — Medication 100 MG: at 14:49

## 2020-05-18 RX ADMIN — OXYCODONE HYDROCHLORIDE 10 MG: 10 TABLET ORAL at 12:01

## 2020-05-19 LAB
ANION GAP SERPL CALCULATED.3IONS-SCNC: 8 MMOL/L (ref 4–13)
BUN SERPL-MCNC: 4 MG/DL
CALCIUM SERPL-MCNC: 8.3 MG/DL (ref 8.3–10.1)
CHLORIDE SERPL-SCNC: 109 MMOL/L (ref 100–108)
CO2 SERPL-SCNC: 21 MMOL/L
CREAT SERPL-MCNC: 0.6 MG/DL
ERYTHROCYTE [DISTWIDTH] IN BLOOD BY AUTOMATED COUNT: 13 % (ref 11.6–15.1)
GFR SERPL CREATININE-BSD FRML MDRD: 53 ML/MIN/1.73SQ M
GLUCOSE SERPL-MCNC: 133 MG/DL (ref 65–140)
HCT VFR BLD AUTO: 31.8 % (ref 34.8–46.1)
HGB BLD-MCNC: 10.4 G/DL (ref 11.5–15.4)
MCH RBC QN AUTO: 30.8 PG (ref 26.8–34.3)
MCHC RBC AUTO-ENTMCNC: 32.7 G/DL (ref 31.4–37.4)
MCV RBC AUTO: 94 FL (ref 82–98)
PLATELET # BLD AUTO: 271 THOUSANDS/UL (ref 149–390)
PMV BLD AUTO: 10 FL (ref 8.9–12.7)
POTASSIUM SERPL-SCNC: 3.9 MMOL/L (ref 3.5–5.3)
RBC # BLD AUTO: 3.38 MILLION/UL (ref 3.81–5.12)
SODIUM SERPL-SCNC: 138 MMOL/L (ref 136–145)
WBC # BLD AUTO: 12.79 THOUSAND/UL (ref 4.31–10.16)

## 2020-05-19 PROCEDURE — 97163 PT EVAL HIGH COMPLEX 45 MIN: CPT

## 2020-05-19 PROCEDURE — 80048 BASIC METABOLIC PNL TOTAL CA: CPT | Performed by: PHYSICIAN ASSISTANT

## 2020-05-19 PROCEDURE — 99024 POSTOP FOLLOW-UP VISIT: CPT | Performed by: ORTHOPAEDIC SURGERY

## 2020-05-19 PROCEDURE — 99231 SBSQ HOSP IP/OBS SF/LOW 25: CPT | Performed by: EMERGENCY MEDICINE

## 2020-05-19 PROCEDURE — 85027 COMPLETE CBC AUTOMATED: CPT | Performed by: PHYSICIAN ASSISTANT

## 2020-05-19 PROCEDURE — 97167 OT EVAL HIGH COMPLEX 60 MIN: CPT

## 2020-05-19 RX ORDER — FLUTICASONE PROPIONATE 50 MCG
1 SPRAY, SUSPENSION (ML) NASAL DAILY PRN
Status: DISCONTINUED | OUTPATIENT
Start: 2020-05-19 | End: 2020-05-20 | Stop reason: HOSPADM

## 2020-05-19 RX ORDER — ALPRAZOLAM 0.25 MG/1
0.25 TABLET ORAL
Status: DISCONTINUED | OUTPATIENT
Start: 2020-05-19 | End: 2020-05-20 | Stop reason: HOSPADM

## 2020-05-19 RX ORDER — ALPRAZOLAM 0.25 MG/1
0.25 TABLET ORAL ONCE
Status: DISCONTINUED | OUTPATIENT
Start: 2020-05-19 | End: 2020-05-20 | Stop reason: HOSPADM

## 2020-05-19 RX ORDER — ESCITALOPRAM OXALATE 10 MG/1
10 TABLET ORAL DAILY
Status: DISCONTINUED | OUTPATIENT
Start: 2020-05-20 | End: 2020-05-20 | Stop reason: HOSPADM

## 2020-05-19 RX ORDER — ALBUTEROL SULFATE 90 UG/1
2 AEROSOL, METERED RESPIRATORY (INHALATION) EVERY 6 HOURS PRN
Status: DISCONTINUED | OUTPATIENT
Start: 2020-05-19 | End: 2020-05-20 | Stop reason: HOSPADM

## 2020-05-19 RX ADMIN — ENOXAPARIN SODIUM 40 MG: 40 INJECTION SUBCUTANEOUS at 08:14

## 2020-05-19 RX ADMIN — ACETAMINOPHEN 975 MG: 325 TABLET ORAL at 14:26

## 2020-05-19 RX ADMIN — SENNOSIDES AND DOCUSATE SODIUM 2 TABLET: 8.6; 5 TABLET ORAL at 08:14

## 2020-05-19 RX ADMIN — METHOCARBAMOL TABLETS 750 MG: 750 TABLET, COATED ORAL at 12:05

## 2020-05-19 RX ADMIN — ACETAMINOPHEN 975 MG: 325 TABLET ORAL at 21:40

## 2020-05-19 RX ADMIN — OXYCODONE HYDROCHLORIDE 10 MG: 10 TABLET ORAL at 19:05

## 2020-05-19 RX ADMIN — ALPRAZOLAM 0.25 MG: 0.25 TABLET ORAL at 22:03

## 2020-05-19 RX ADMIN — HYDROMORPHONE HYDROCHLORIDE 0.5 MG: 1 INJECTION, SOLUTION INTRAMUSCULAR; INTRAVENOUS; SUBCUTANEOUS at 21:40

## 2020-05-19 RX ADMIN — OXYCODONE HYDROCHLORIDE 10 MG: 10 TABLET ORAL at 08:14

## 2020-05-19 RX ADMIN — ACETAMINOPHEN 975 MG: 325 TABLET ORAL at 06:02

## 2020-05-19 RX ADMIN — METHOCARBAMOL TABLETS 750 MG: 750 TABLET, COATED ORAL at 06:02

## 2020-05-19 RX ADMIN — CEFAZOLIN SODIUM 2000 MG: 2 SOLUTION INTRAVENOUS at 23:06

## 2020-05-19 RX ADMIN — GABAPENTIN 100 MG: 100 CAPSULE ORAL at 17:31

## 2020-05-19 RX ADMIN — CEFAZOLIN SODIUM 2000 MG: 2 SOLUTION INTRAVENOUS at 14:26

## 2020-05-19 RX ADMIN — HYDROMORPHONE HYDROCHLORIDE 0.5 MG: 1 INJECTION, SOLUTION INTRAMUSCULAR; INTRAVENOUS; SUBCUTANEOUS at 10:59

## 2020-05-19 RX ADMIN — METHOCARBAMOL TABLETS 750 MG: 750 TABLET, COATED ORAL at 01:00

## 2020-05-19 RX ADMIN — METHOCARBAMOL TABLETS 750 MG: 750 TABLET, COATED ORAL at 17:31

## 2020-05-19 RX ADMIN — GABAPENTIN 100 MG: 100 CAPSULE ORAL at 08:14

## 2020-05-19 RX ADMIN — CEFAZOLIN SODIUM 2000 MG: 2 SOLUTION INTRAVENOUS at 06:03

## 2020-05-19 RX ADMIN — OXYCODONE HYDROCHLORIDE 10 MG: 10 TABLET ORAL at 14:26

## 2020-05-19 RX ADMIN — GABAPENTIN 100 MG: 100 CAPSULE ORAL at 21:40

## 2020-05-19 RX ADMIN — OXYCODONE HYDROCHLORIDE 10 MG: 10 TABLET ORAL at 01:43

## 2020-05-20 VITALS
BODY MASS INDEX: 25.01 KG/M2 | SYSTOLIC BLOOD PRESSURE: 106 MMHG | TEMPERATURE: 98.7 F | OXYGEN SATURATION: 97 % | DIASTOLIC BLOOD PRESSURE: 58 MMHG | HEART RATE: 88 BPM | WEIGHT: 165 LBS | RESPIRATION RATE: 18 BRPM | HEIGHT: 68 IN

## 2020-05-20 PROBLEM — D64.9 ANEMIA: Status: ACTIVE | Noted: 2020-05-20

## 2020-05-20 LAB
ANION GAP SERPL CALCULATED.3IONS-SCNC: 4 MMOL/L (ref 4–13)
BUN SERPL-MCNC: 7 MG/DL (ref 5–25)
CALCIUM SERPL-MCNC: 8.1 MG/DL (ref 8.3–10.1)
CHLORIDE SERPL-SCNC: 109 MMOL/L (ref 100–108)
CO2 SERPL-SCNC: 28 MMOL/L (ref 21–32)
CREAT SERPL-MCNC: 0.56 MG/DL (ref 0.6–1.3)
ERYTHROCYTE [DISTWIDTH] IN BLOOD BY AUTOMATED COUNT: 13.2 % (ref 11.6–15.1)
GFR SERPL CREATININE-BSD FRML MDRD: 123 ML/MIN/1.73SQ M
GLUCOSE SERPL-MCNC: 91 MG/DL (ref 65–140)
HCT VFR BLD AUTO: 27.8 % (ref 34.8–46.1)
HGB BLD-MCNC: 8.9 G/DL (ref 11.5–15.4)
MCH RBC QN AUTO: 30.3 PG (ref 26.8–34.3)
MCHC RBC AUTO-ENTMCNC: 32 G/DL (ref 31.4–37.4)
MCV RBC AUTO: 95 FL (ref 82–98)
PLATELET # BLD AUTO: 216 THOUSANDS/UL (ref 149–390)
PMV BLD AUTO: 9.4 FL (ref 8.9–12.7)
POTASSIUM SERPL-SCNC: 3.8 MMOL/L (ref 3.5–5.3)
RBC # BLD AUTO: 2.94 MILLION/UL (ref 3.81–5.12)
SODIUM SERPL-SCNC: 141 MMOL/L (ref 136–145)
WBC # BLD AUTO: 10.33 THOUSAND/UL (ref 4.31–10.16)

## 2020-05-20 PROCEDURE — 97530 THERAPEUTIC ACTIVITIES: CPT

## 2020-05-20 PROCEDURE — 97116 GAIT TRAINING THERAPY: CPT

## 2020-05-20 PROCEDURE — NC001 PR NO CHARGE: Performed by: ORTHOPAEDIC SURGERY

## 2020-05-20 PROCEDURE — 80048 BASIC METABOLIC PNL TOTAL CA: CPT | Performed by: PHYSICIAN ASSISTANT

## 2020-05-20 PROCEDURE — 85027 COMPLETE CBC AUTOMATED: CPT | Performed by: PHYSICIAN ASSISTANT

## 2020-05-20 PROCEDURE — 99238 HOSP IP/OBS DSCHRG MGMT 30/<: CPT | Performed by: SURGERY

## 2020-05-20 RX ORDER — ACETAMINOPHEN 325 MG/1
975 TABLET ORAL EVERY 8 HOURS SCHEDULED
Qty: 30 TABLET | Refills: 0 | Status: SHIPPED | OUTPATIENT
Start: 2020-05-20

## 2020-05-20 RX ORDER — METHOCARBAMOL 750 MG/1
750 TABLET, FILM COATED ORAL EVERY 6 HOURS SCHEDULED
Qty: 60 TABLET | Refills: 0 | Status: SHIPPED | OUTPATIENT
Start: 2020-05-20 | End: 2020-07-21 | Stop reason: ALTCHOICE

## 2020-05-20 RX ORDER — GABAPENTIN 100 MG/1
100 CAPSULE ORAL 3 TIMES DAILY
Qty: 60 CAPSULE | Refills: 0 | Status: SHIPPED | OUTPATIENT
Start: 2020-05-20 | End: 2020-07-21 | Stop reason: ALTCHOICE

## 2020-05-20 RX ORDER — AMOXICILLIN 250 MG
2 CAPSULE ORAL DAILY
Qty: 30 TABLET | Refills: 0 | Status: SHIPPED | OUTPATIENT
Start: 2020-05-21 | End: 2020-11-16 | Stop reason: ALTCHOICE

## 2020-05-20 RX ORDER — CEFAZOLIN SODIUM 2 G/50ML
2000 SOLUTION INTRAVENOUS ONCE
Status: COMPLETED | OUTPATIENT
Start: 2020-05-20 | End: 2020-05-20

## 2020-05-20 RX ORDER — OXYCODONE HYDROCHLORIDE 5 MG/1
TABLET ORAL
Qty: 30 TABLET | Refills: 0 | Status: SHIPPED | OUTPATIENT
Start: 2020-05-20 | End: 2020-06-01 | Stop reason: SDUPTHER

## 2020-05-20 RX ADMIN — OXYCODONE HYDROCHLORIDE 10 MG: 10 TABLET ORAL at 04:53

## 2020-05-20 RX ADMIN — OXYCODONE HYDROCHLORIDE 10 MG: 10 TABLET ORAL at 14:40

## 2020-05-20 RX ADMIN — GABAPENTIN 100 MG: 100 CAPSULE ORAL at 09:13

## 2020-05-20 RX ADMIN — SENNOSIDES AND DOCUSATE SODIUM 2 TABLET: 8.6; 5 TABLET ORAL at 09:17

## 2020-05-20 RX ADMIN — CEFAZOLIN SODIUM 2000 MG: 2 SOLUTION INTRAVENOUS at 13:24

## 2020-05-20 RX ADMIN — ACETAMINOPHEN 975 MG: 325 TABLET ORAL at 05:00

## 2020-05-20 RX ADMIN — ENOXAPARIN SODIUM 40 MG: 40 INJECTION SUBCUTANEOUS at 09:13

## 2020-05-20 RX ADMIN — OXYCODONE HYDROCHLORIDE 10 MG: 10 TABLET ORAL at 11:10

## 2020-05-20 RX ADMIN — CEFAZOLIN SODIUM 2000 MG: 2 SOLUTION INTRAVENOUS at 06:10

## 2020-05-20 RX ADMIN — METHOCARBAMOL TABLETS 750 MG: 750 TABLET, COATED ORAL at 05:00

## 2020-05-20 RX ADMIN — ESCITALOPRAM OXALATE 10 MG: 10 TABLET ORAL at 09:13

## 2020-05-20 RX ADMIN — ACETAMINOPHEN 975 MG: 325 TABLET ORAL at 14:29

## 2020-05-20 RX ADMIN — METHOCARBAMOL TABLETS 750 MG: 750 TABLET, COATED ORAL at 11:10

## 2020-05-28 ENCOUNTER — TELEPHONE (OUTPATIENT)
Dept: OBGYN CLINIC | Facility: HOSPITAL | Age: 35
End: 2020-05-28

## 2020-06-01 ENCOUNTER — HOSPITAL ENCOUNTER (OUTPATIENT)
Dept: RADIOLOGY | Facility: HOSPITAL | Age: 35
Discharge: HOME | End: 2020-06-01
Attending: ORTHOPAEDIC SURGERY
Payer: COMMERCIAL

## 2020-06-01 ENCOUNTER — OFFICE VISIT (OUTPATIENT)
Dept: OBGYN CLINIC | Facility: HOSPITAL | Age: 35
End: 2020-06-01

## 2020-06-01 VITALS
DIASTOLIC BLOOD PRESSURE: 68 MMHG | SYSTOLIC BLOOD PRESSURE: 96 MMHG | HEIGHT: 68 IN | BODY MASS INDEX: 25.01 KG/M2 | HEART RATE: 88 BPM | WEIGHT: 165 LBS

## 2020-06-01 DIAGNOSIS — Z48.89 AFTERCARE FOLLOWING SURGERY: ICD-10-CM

## 2020-06-01 DIAGNOSIS — Z48.89 AFTERCARE FOLLOWING SURGERY: Primary | ICD-10-CM

## 2020-06-01 DIAGNOSIS — S82.201B: ICD-10-CM

## 2020-06-01 DIAGNOSIS — S82.301B TYPE I OR II OPEN FRACTURE OF DISTAL END OF RIGHT TIBIA, UNSPECIFIED FRACTURE MORPHOLOGY, INITIAL ENCOUNTER: ICD-10-CM

## 2020-06-01 PROCEDURE — 73590 X-RAY EXAM OF LOWER LEG: CPT

## 2020-06-01 PROCEDURE — 99024 POSTOP FOLLOW-UP VISIT: CPT | Performed by: ORTHOPAEDIC SURGERY

## 2020-06-01 RX ORDER — OXYCODONE HYDROCHLORIDE 5 MG/1
TABLET ORAL
Qty: 30 TABLET | Refills: 0 | Status: SHIPPED | OUTPATIENT
Start: 2020-06-01 | End: 2020-11-16 | Stop reason: ALTCHOICE

## 2020-06-02 ENCOUNTER — EVALUATION (OUTPATIENT)
Dept: PHYSICAL THERAPY | Facility: CLINIC | Age: 35
End: 2020-06-02
Payer: OTHER MISCELLANEOUS

## 2020-06-02 DIAGNOSIS — S82.301B TYPE I OR II OPEN FRACTURE OF DISTAL END OF RIGHT TIBIA, UNSPECIFIED FRACTURE MORPHOLOGY, INITIAL ENCOUNTER: ICD-10-CM

## 2020-06-02 DIAGNOSIS — Z48.89 AFTERCARE FOLLOWING SURGERY: ICD-10-CM

## 2020-06-02 PROCEDURE — 97535 SELF CARE MNGMENT TRAINING: CPT | Performed by: PHYSICAL THERAPIST

## 2020-06-02 PROCEDURE — 97161 PT EVAL LOW COMPLEX 20 MIN: CPT | Performed by: PHYSICAL THERAPIST

## 2020-06-02 PROCEDURE — 97110 THERAPEUTIC EXERCISES: CPT | Performed by: PHYSICAL THERAPIST

## 2020-06-04 ENCOUNTER — OFFICE VISIT (OUTPATIENT)
Dept: PHYSICAL THERAPY | Facility: CLINIC | Age: 35
End: 2020-06-04
Payer: OTHER MISCELLANEOUS

## 2020-06-04 DIAGNOSIS — Z48.89 AFTERCARE FOLLOWING SURGERY: Primary | ICD-10-CM

## 2020-06-04 DIAGNOSIS — S82.301E TYPE I OR II OPEN FRACTURE OF DISTAL END OF RIGHT TIBIA WITH ROUTINE HEALING, UNSPECIFIED FRACTURE MORPHOLOGY, SUBSEQUENT ENCOUNTER: ICD-10-CM

## 2020-06-04 PROCEDURE — 97112 NEUROMUSCULAR REEDUCATION: CPT | Performed by: PHYSICAL THERAPIST

## 2020-06-04 PROCEDURE — 97140 MANUAL THERAPY 1/> REGIONS: CPT | Performed by: PHYSICAL THERAPIST

## 2020-06-04 PROCEDURE — 97110 THERAPEUTIC EXERCISES: CPT | Performed by: PHYSICAL THERAPIST

## 2020-06-04 PROCEDURE — 97116 GAIT TRAINING THERAPY: CPT | Performed by: PHYSICAL THERAPIST

## 2020-06-05 ENCOUNTER — OFFICE VISIT (OUTPATIENT)
Dept: PHYSICAL THERAPY | Facility: CLINIC | Age: 35
End: 2020-06-05
Payer: OTHER MISCELLANEOUS

## 2020-06-05 DIAGNOSIS — Z48.89 AFTERCARE FOLLOWING SURGERY: Primary | ICD-10-CM

## 2020-06-05 DIAGNOSIS — S82.301E TYPE I OR II OPEN FRACTURE OF DISTAL END OF RIGHT TIBIA WITH ROUTINE HEALING, UNSPECIFIED FRACTURE MORPHOLOGY, SUBSEQUENT ENCOUNTER: ICD-10-CM

## 2020-06-05 DIAGNOSIS — S82.301B TYPE I OR II OPEN FRACTURE OF DISTAL END OF RIGHT TIBIA, UNSPECIFIED FRACTURE MORPHOLOGY, INITIAL ENCOUNTER: ICD-10-CM

## 2020-06-05 PROCEDURE — 97110 THERAPEUTIC EXERCISES: CPT | Performed by: PHYSICAL THERAPIST

## 2020-06-05 PROCEDURE — 97530 THERAPEUTIC ACTIVITIES: CPT | Performed by: PHYSICAL THERAPIST

## 2020-06-05 PROCEDURE — 97112 NEUROMUSCULAR REEDUCATION: CPT | Performed by: PHYSICAL THERAPIST

## 2020-06-05 PROCEDURE — 97140 MANUAL THERAPY 1/> REGIONS: CPT | Performed by: PHYSICAL THERAPIST

## 2020-06-08 ENCOUNTER — OFFICE VISIT (OUTPATIENT)
Dept: PHYSICAL THERAPY | Facility: CLINIC | Age: 35
End: 2020-06-08
Payer: OTHER MISCELLANEOUS

## 2020-06-08 DIAGNOSIS — Z48.89 AFTERCARE FOLLOWING SURGERY: Primary | ICD-10-CM

## 2020-06-08 DIAGNOSIS — S82.301B TYPE I OR II OPEN FRACTURE OF DISTAL END OF RIGHT TIBIA, UNSPECIFIED FRACTURE MORPHOLOGY, INITIAL ENCOUNTER: ICD-10-CM

## 2020-06-08 DIAGNOSIS — S82.301E TYPE I OR II OPEN FRACTURE OF DISTAL END OF RIGHT TIBIA WITH ROUTINE HEALING, UNSPECIFIED FRACTURE MORPHOLOGY, SUBSEQUENT ENCOUNTER: ICD-10-CM

## 2020-06-08 PROCEDURE — 97110 THERAPEUTIC EXERCISES: CPT | Performed by: PHYSICAL THERAPIST

## 2020-06-08 PROCEDURE — 97140 MANUAL THERAPY 1/> REGIONS: CPT | Performed by: PHYSICAL THERAPIST

## 2020-06-08 PROCEDURE — 97112 NEUROMUSCULAR REEDUCATION: CPT | Performed by: PHYSICAL THERAPIST

## 2020-06-10 ENCOUNTER — OFFICE VISIT (OUTPATIENT)
Dept: PHYSICAL THERAPY | Facility: CLINIC | Age: 35
End: 2020-06-10
Payer: OTHER MISCELLANEOUS

## 2020-06-10 ENCOUNTER — TELEPHONE (OUTPATIENT)
Dept: OBGYN CLINIC | Facility: CLINIC | Age: 35
End: 2020-06-10

## 2020-06-10 DIAGNOSIS — S82.301B TYPE I OR II OPEN FRACTURE OF DISTAL END OF RIGHT TIBIA, UNSPECIFIED FRACTURE MORPHOLOGY, INITIAL ENCOUNTER: ICD-10-CM

## 2020-06-10 DIAGNOSIS — S82.301E TYPE I OR II OPEN FRACTURE OF DISTAL END OF RIGHT TIBIA WITH ROUTINE HEALING, UNSPECIFIED FRACTURE MORPHOLOGY, SUBSEQUENT ENCOUNTER: ICD-10-CM

## 2020-06-10 DIAGNOSIS — Z48.89 AFTERCARE FOLLOWING SURGERY: Primary | ICD-10-CM

## 2020-06-10 PROCEDURE — 97110 THERAPEUTIC EXERCISES: CPT | Performed by: PHYSICAL THERAPIST

## 2020-06-10 PROCEDURE — 97112 NEUROMUSCULAR REEDUCATION: CPT | Performed by: PHYSICAL THERAPIST

## 2020-06-10 PROCEDURE — 97140 MANUAL THERAPY 1/> REGIONS: CPT | Performed by: PHYSICAL THERAPIST

## 2020-06-12 ENCOUNTER — OFFICE VISIT (OUTPATIENT)
Dept: PHYSICAL THERAPY | Facility: CLINIC | Age: 35
End: 2020-06-12
Payer: OTHER MISCELLANEOUS

## 2020-06-12 DIAGNOSIS — S82.301E TYPE I OR II OPEN FRACTURE OF DISTAL END OF RIGHT TIBIA WITH ROUTINE HEALING, UNSPECIFIED FRACTURE MORPHOLOGY, SUBSEQUENT ENCOUNTER: ICD-10-CM

## 2020-06-12 DIAGNOSIS — Z48.89 AFTERCARE FOLLOWING SURGERY: Primary | ICD-10-CM

## 2020-06-12 PROCEDURE — 97112 NEUROMUSCULAR REEDUCATION: CPT | Performed by: PHYSICAL THERAPIST

## 2020-06-12 PROCEDURE — 97140 MANUAL THERAPY 1/> REGIONS: CPT | Performed by: PHYSICAL THERAPIST

## 2020-06-12 PROCEDURE — 97110 THERAPEUTIC EXERCISES: CPT | Performed by: PHYSICAL THERAPIST

## 2020-06-15 ENCOUNTER — OFFICE VISIT (OUTPATIENT)
Dept: PHYSICAL THERAPY | Facility: CLINIC | Age: 35
End: 2020-06-15
Payer: OTHER MISCELLANEOUS

## 2020-06-15 DIAGNOSIS — S82.301E TYPE I OR II OPEN FRACTURE OF DISTAL END OF RIGHT TIBIA WITH ROUTINE HEALING, UNSPECIFIED FRACTURE MORPHOLOGY, SUBSEQUENT ENCOUNTER: ICD-10-CM

## 2020-06-15 DIAGNOSIS — Z48.89 AFTERCARE FOLLOWING SURGERY: Primary | ICD-10-CM

## 2020-06-15 PROCEDURE — 97112 NEUROMUSCULAR REEDUCATION: CPT | Performed by: PHYSICAL THERAPIST

## 2020-06-15 PROCEDURE — 97140 MANUAL THERAPY 1/> REGIONS: CPT | Performed by: PHYSICAL THERAPIST

## 2020-06-15 PROCEDURE — 97110 THERAPEUTIC EXERCISES: CPT | Performed by: PHYSICAL THERAPIST

## 2020-06-16 ENCOUNTER — OFFICE VISIT (OUTPATIENT)
Dept: PHYSICAL THERAPY | Facility: CLINIC | Age: 35
End: 2020-06-16
Payer: OTHER MISCELLANEOUS

## 2020-06-16 DIAGNOSIS — Z48.89 AFTERCARE FOLLOWING SURGERY: Primary | ICD-10-CM

## 2020-06-16 DIAGNOSIS — S82.301E TYPE I OR II OPEN FRACTURE OF DISTAL END OF RIGHT TIBIA WITH ROUTINE HEALING, UNSPECIFIED FRACTURE MORPHOLOGY, SUBSEQUENT ENCOUNTER: ICD-10-CM

## 2020-06-16 PROCEDURE — 97110 THERAPEUTIC EXERCISES: CPT | Performed by: PHYSICAL THERAPIST

## 2020-06-16 PROCEDURE — 97112 NEUROMUSCULAR REEDUCATION: CPT | Performed by: PHYSICAL THERAPIST

## 2020-06-16 PROCEDURE — 97140 MANUAL THERAPY 1/> REGIONS: CPT | Performed by: PHYSICAL THERAPIST

## 2020-06-16 PROCEDURE — 97530 THERAPEUTIC ACTIVITIES: CPT | Performed by: PHYSICAL THERAPIST

## 2020-06-19 ENCOUNTER — OFFICE VISIT (OUTPATIENT)
Dept: PHYSICAL THERAPY | Facility: CLINIC | Age: 35
End: 2020-06-19
Payer: OTHER MISCELLANEOUS

## 2020-06-19 DIAGNOSIS — S82.301B TYPE I OR II OPEN FRACTURE OF DISTAL END OF RIGHT TIBIA, UNSPECIFIED FRACTURE MORPHOLOGY, INITIAL ENCOUNTER: ICD-10-CM

## 2020-06-19 DIAGNOSIS — Z48.89 AFTERCARE FOLLOWING SURGERY: Primary | ICD-10-CM

## 2020-06-19 DIAGNOSIS — S82.301E TYPE I OR II OPEN FRACTURE OF DISTAL END OF RIGHT TIBIA WITH ROUTINE HEALING, UNSPECIFIED FRACTURE MORPHOLOGY, SUBSEQUENT ENCOUNTER: ICD-10-CM

## 2020-06-19 PROCEDURE — 97112 NEUROMUSCULAR REEDUCATION: CPT

## 2020-06-19 PROCEDURE — 97140 MANUAL THERAPY 1/> REGIONS: CPT

## 2020-06-19 PROCEDURE — 97110 THERAPEUTIC EXERCISES: CPT

## 2020-06-23 ENCOUNTER — OFFICE VISIT (OUTPATIENT)
Dept: PHYSICAL THERAPY | Facility: CLINIC | Age: 35
End: 2020-06-23
Payer: OTHER MISCELLANEOUS

## 2020-06-23 DIAGNOSIS — S82.301E TYPE I OR II OPEN FRACTURE OF DISTAL END OF RIGHT TIBIA WITH ROUTINE HEALING, UNSPECIFIED FRACTURE MORPHOLOGY, SUBSEQUENT ENCOUNTER: ICD-10-CM

## 2020-06-23 DIAGNOSIS — Z48.89 AFTERCARE FOLLOWING SURGERY: Primary | ICD-10-CM

## 2020-06-23 PROCEDURE — 97112 NEUROMUSCULAR REEDUCATION: CPT | Performed by: PHYSICAL THERAPIST

## 2020-06-23 PROCEDURE — 97140 MANUAL THERAPY 1/> REGIONS: CPT | Performed by: PHYSICAL THERAPIST

## 2020-06-23 PROCEDURE — 97110 THERAPEUTIC EXERCISES: CPT | Performed by: PHYSICAL THERAPIST

## 2020-06-24 ENCOUNTER — OFFICE VISIT (OUTPATIENT)
Dept: PHYSICAL THERAPY | Facility: CLINIC | Age: 35
End: 2020-06-24
Payer: OTHER MISCELLANEOUS

## 2020-06-24 DIAGNOSIS — Z48.89 AFTERCARE FOLLOWING SURGERY: Primary | ICD-10-CM

## 2020-06-24 DIAGNOSIS — S82.301B TYPE I OR II OPEN FRACTURE OF DISTAL END OF RIGHT TIBIA, UNSPECIFIED FRACTURE MORPHOLOGY, INITIAL ENCOUNTER: ICD-10-CM

## 2020-06-24 DIAGNOSIS — S82.301E TYPE I OR II OPEN FRACTURE OF DISTAL END OF RIGHT TIBIA WITH ROUTINE HEALING, UNSPECIFIED FRACTURE MORPHOLOGY, SUBSEQUENT ENCOUNTER: ICD-10-CM

## 2020-06-24 PROCEDURE — 97140 MANUAL THERAPY 1/> REGIONS: CPT | Performed by: PHYSICAL THERAPIST

## 2020-06-24 PROCEDURE — 97112 NEUROMUSCULAR REEDUCATION: CPT | Performed by: PHYSICAL THERAPIST

## 2020-06-24 PROCEDURE — 97110 THERAPEUTIC EXERCISES: CPT | Performed by: PHYSICAL THERAPIST

## 2020-06-26 ENCOUNTER — EVALUATION (OUTPATIENT)
Dept: PHYSICAL THERAPY | Facility: CLINIC | Age: 35
End: 2020-06-26
Payer: OTHER MISCELLANEOUS

## 2020-06-26 DIAGNOSIS — Z48.89 AFTERCARE FOLLOWING SURGERY: Primary | ICD-10-CM

## 2020-06-26 DIAGNOSIS — S82.301E TYPE I OR II OPEN FRACTURE OF DISTAL END OF RIGHT TIBIA WITH ROUTINE HEALING, UNSPECIFIED FRACTURE MORPHOLOGY, SUBSEQUENT ENCOUNTER: ICD-10-CM

## 2020-06-26 DIAGNOSIS — S82.301B TYPE I OR II OPEN FRACTURE OF DISTAL END OF RIGHT TIBIA, UNSPECIFIED FRACTURE MORPHOLOGY, INITIAL ENCOUNTER: ICD-10-CM

## 2020-06-26 PROCEDURE — 97110 THERAPEUTIC EXERCISES: CPT | Performed by: PHYSICAL THERAPIST

## 2020-06-26 PROCEDURE — 97530 THERAPEUTIC ACTIVITIES: CPT | Performed by: PHYSICAL THERAPIST

## 2020-06-26 PROCEDURE — 97140 MANUAL THERAPY 1/> REGIONS: CPT | Performed by: PHYSICAL THERAPIST

## 2020-06-29 ENCOUNTER — OFFICE VISIT (OUTPATIENT)
Dept: OBGYN CLINIC | Facility: HOSPITAL | Age: 35
End: 2020-06-29

## 2020-06-29 ENCOUNTER — HOSPITAL ENCOUNTER (OUTPATIENT)
Dept: RADIOLOGY | Facility: HOSPITAL | Age: 35
Discharge: HOME | End: 2020-06-29
Attending: ORTHOPAEDIC SURGERY
Payer: COMMERCIAL

## 2020-06-29 VITALS
HEART RATE: 98 BPM | HEIGHT: 68 IN | SYSTOLIC BLOOD PRESSURE: 99 MMHG | WEIGHT: 165 LBS | BODY MASS INDEX: 25.01 KG/M2 | DIASTOLIC BLOOD PRESSURE: 63 MMHG

## 2020-06-29 DIAGNOSIS — Z48.89 AFTERCARE FOLLOWING SURGERY: Primary | ICD-10-CM

## 2020-06-29 DIAGNOSIS — Z48.89 AFTERCARE FOLLOWING SURGERY: ICD-10-CM

## 2020-06-29 PROCEDURE — 99024 POSTOP FOLLOW-UP VISIT: CPT | Performed by: ORTHOPAEDIC SURGERY

## 2020-06-29 PROCEDURE — 73590 X-RAY EXAM OF LOWER LEG: CPT

## 2020-06-30 ENCOUNTER — OFFICE VISIT (OUTPATIENT)
Dept: PHYSICAL THERAPY | Facility: CLINIC | Age: 35
End: 2020-06-30
Payer: OTHER MISCELLANEOUS

## 2020-06-30 DIAGNOSIS — S82.301E TYPE I OR II OPEN FRACTURE OF DISTAL END OF RIGHT TIBIA WITH ROUTINE HEALING, UNSPECIFIED FRACTURE MORPHOLOGY, SUBSEQUENT ENCOUNTER: ICD-10-CM

## 2020-06-30 DIAGNOSIS — Z48.89 AFTERCARE FOLLOWING SURGERY: Primary | ICD-10-CM

## 2020-06-30 PROCEDURE — 97112 NEUROMUSCULAR REEDUCATION: CPT | Performed by: PHYSICAL THERAPIST

## 2020-06-30 PROCEDURE — 97140 MANUAL THERAPY 1/> REGIONS: CPT | Performed by: PHYSICAL THERAPIST

## 2020-06-30 PROCEDURE — 97110 THERAPEUTIC EXERCISES: CPT | Performed by: PHYSICAL THERAPIST

## 2020-07-01 ENCOUNTER — OFFICE VISIT (OUTPATIENT)
Dept: PHYSICAL THERAPY | Facility: CLINIC | Age: 35
End: 2020-07-01
Payer: OTHER MISCELLANEOUS

## 2020-07-01 DIAGNOSIS — S82.301E TYPE I OR II OPEN FRACTURE OF DISTAL END OF RIGHT TIBIA WITH ROUTINE HEALING, UNSPECIFIED FRACTURE MORPHOLOGY, SUBSEQUENT ENCOUNTER: ICD-10-CM

## 2020-07-01 DIAGNOSIS — Z48.89 AFTERCARE FOLLOWING SURGERY: Primary | ICD-10-CM

## 2020-07-01 PROCEDURE — 97110 THERAPEUTIC EXERCISES: CPT | Performed by: PHYSICAL THERAPIST

## 2020-07-01 PROCEDURE — 97112 NEUROMUSCULAR REEDUCATION: CPT | Performed by: PHYSICAL THERAPIST

## 2020-07-01 PROCEDURE — 97140 MANUAL THERAPY 1/> REGIONS: CPT | Performed by: PHYSICAL THERAPIST

## 2020-07-01 NOTE — PROGRESS NOTES
Daily Note     Today's date: 2020  Patient name: Dino Maldonado  : 1985  MRN: [id number]  Referring provider: Óscar Guardado MD  Dx:   Encounter Diagnosis     ICD-10-CM    1  Aftercare following surgery Z48 89    2  Type I or II open fracture of distal end of right tibia with routine healing, unspecified fracture morphology, subsequent encounter S82 301E        Start Time: 845  Stop Time: 930  Total time in clinic (min): 45 minutes    Subjective: No new complaints as per patient  Objective: See treatment diary below      Assessment: Mild increase in swelling noted at involved ankle as patient has been on feet all day yesterday  Initiated 4" step ups with RLE  Pt required cueing to decrease contralateral push off  Pt does note soreness in the leg after treatment  Instructed patient to ice and elevate after treatment and reduce WB on involved LE is soreness persists  Plan: Continue per plan of care        Precautions: DDD, Myofascial pain syndrome      Manuals    PROM of the right knee              PROM of the R ankle 7' 5'       8' 5' 5'   R PFJM              AP TC jt mobs 5' gr III 5' Gr III       NV Gr III 3' Gr III 5'   Neuro Re-Ed              Quad sets              Single leg stance 5x10" FT support RLE in CAM boot             WB rocking with walker  FF WB in shoe -pain free side to side / forward backward 4'             Bridges              TKE with tb [place] 12# 30x         30x BTB [place] 12# 30x     SLR flexion              Biodex WB %         4' -85% WB over RLE 4' -90%+ WB over RLE 4' - 95% WB over RLE   LAQ 5# 2x10  5# 2x10             Total gym squats 3x10 as vincent 3x10       To 90 deg x20 To 90 deg x20 To 90 deg x20   Ther Ex              Ankle pumps              Ankle alphabet               Gastroc stretch         4x30" Progressed    Hamstring stretch         10x10"     Heel prop              Soleus stretch              SLR abduction SL clam shells              Ankle TB 4 ways         30x RTB  30x gtb  30x gtb   Hamstring curl -seated              Heel slides DC        10x10" 10x10" 10x10"   Standing hip extension         RTB 3x10     Standing hip ABD         RTB 3x10     Standing hip flexion         RTB 3x10     NuStep 8' lvl 6  8' lvl 6        8' lv 4 8' lvl 5 8' lvl 5   SB gastroc stretch With RW 4x30" With RW 4x30"        With RW 4x30" With RW 4x30   SB soleus stretch With RW 4x30" With RW 4x30"        With RW 4x30"  With RW 4x30   Prone quad stretch 4x30" 4x30"        5x10"    Prone hamstring curls 2x10 3# 2x10 3#       nv 10x    Ther Activity              Stair negotiation              Lateral wallks         4x15 ft     Step up  4" 2x10 with cueing            Mini squats              Gait Training              With walker or crutches              1 crutch Gait training              Modalities              CP PRN                                            1:1 with PT from [phone]

## 2020-07-03 ENCOUNTER — OFFICE VISIT (OUTPATIENT)
Dept: PHYSICAL THERAPY | Facility: CLINIC | Age: 35
End: 2020-07-03
Payer: OTHER MISCELLANEOUS

## 2020-07-03 DIAGNOSIS — S82.301B TYPE I OR II OPEN FRACTURE OF DISTAL END OF RIGHT TIBIA, UNSPECIFIED FRACTURE MORPHOLOGY, INITIAL ENCOUNTER: ICD-10-CM

## 2020-07-03 DIAGNOSIS — Z48.89 AFTERCARE FOLLOWING SURGERY: Primary | ICD-10-CM

## 2020-07-03 DIAGNOSIS — S82.301E TYPE I OR II OPEN FRACTURE OF DISTAL END OF RIGHT TIBIA WITH ROUTINE HEALING, UNSPECIFIED FRACTURE MORPHOLOGY, SUBSEQUENT ENCOUNTER: ICD-10-CM

## 2020-07-03 PROCEDURE — 97112 NEUROMUSCULAR REEDUCATION: CPT | Performed by: PHYSICAL THERAPIST

## 2020-07-03 PROCEDURE — 97110 THERAPEUTIC EXERCISES: CPT | Performed by: PHYSICAL THERAPIST

## 2020-07-03 PROCEDURE — 97530 THERAPEUTIC ACTIVITIES: CPT | Performed by: PHYSICAL THERAPIST

## 2020-07-03 PROCEDURE — 97140 MANUAL THERAPY 1/> REGIONS: CPT | Performed by: PHYSICAL THERAPIST

## 2020-07-03 NOTE — PROGRESS NOTES
Daily Note     Today's date: 7/3/2020  Patient name: Serene Antunez  : 1985  MRN: [id number]  Referring provider: Bhavik Turner MD  Dx:   Encounter Diagnosis     ICD-10-CM    1  Aftercare following surgery Z48 89    2  Type I or II open fracture of distal end of right tibia with routine healing, unspecified fracture morphology, subsequent encounter S82 301E    3  Type I or II open fracture of distal end of right tibia, unspecified fracture morphology, initial encounter S82 301B        Start Time: 1005  Stop Time: 1100  Total time in clinic (min): 55 minutes    Subjective: Pt reports being pretty sore after the last treatment but it has subsided today and she is feeling good  Objective: See treatment diary below      Assessment: Pt demonstrates increase in quad contraction as demonstrated with LAQ exercises however still has difficulty maintaining straight leg at end range  Pt reported feeling more pressure in her ankle with forward/backward weight acceptance however no increase in symptoms  Pt was able to ascend a 6" step with verbal cueing to activate her glutes to help reduce the amount of contralateral foot push off  Pt is a good candidate for therapy in order to increase LE strength, joint mobility, and ROM  Plan: Continue per plan of care        Precautions: DDD, Myofascial pain syndrome      Manuals  7/3           PROM of the right knee              PROM of the R ankle 7' 5' 5'           R PFJM              AP TC jt mobs 5' gr III 5' Gr III 5' Gr III           Neuro Re-Ed              Quad sets              Single leg stance 5x10" FT support RLE in CAM boot             WB rocking with walker  FF WB in shoe -pain free side to side / forward backward 4'  FF WB in shoe -pain free side to side / forward backward 4'            Bridges              TKE with tb [place] 12# 30x              SLR flexion              Biodex WB %              LAQ 5# 2x10  5# 2x10  5# 2x10           Total gym squats 3x10 as vinecnt 3x10 3x10           Ther Ex              Ankle pumps              Ankle alphabet               Gastroc stretch              Hamstring stretch              Heel prop              Soleus stretch              SLR abduction              SL clam shells              Ankle TB 4 ways              Hamstring curl -seated              Heel slides DC             Standing hip extension              Standing hip ABD              Standing hip flexion              NuStep 8' lvl 6  8' lvl 6  8' lvl 7           SB gastroc stretch With RW 4x30" With RW 4x30" With RW 4x30"           SB soleus stretch With RW 4x30" With RW 4x30" With RW 4x30"           Prone quad stretch 4x30" 4x30" 4x30"           Prone hamstring curls 2x10 3# 2x10 3# 2x10 3#           Ther Activity              Stair negotiation              Lateral wallks              Step up  4" 2x10 with cueing 4" 1x10 w cueing, 6" 2x10 w cueing           Mini squats              Gait Training              With walker or crutches              1 crutch Gait training              Modalities              CP PRN                                            1:1 with PT from 1005 - 1100

## 2020-07-06 ENCOUNTER — TELEPHONE (OUTPATIENT)
Dept: OBGYN CLINIC | Facility: HOSPITAL | Age: 35
End: 2020-07-06

## 2020-07-07 ENCOUNTER — OFFICE VISIT (OUTPATIENT)
Dept: PHYSICAL THERAPY | Facility: CLINIC | Age: 35
End: 2020-07-07
Payer: OTHER MISCELLANEOUS

## 2020-07-07 DIAGNOSIS — Z48.89 AFTERCARE FOLLOWING SURGERY: Primary | ICD-10-CM

## 2020-07-07 DIAGNOSIS — S82.301E TYPE I OR II OPEN FRACTURE OF DISTAL END OF RIGHT TIBIA WITH ROUTINE HEALING, UNSPECIFIED FRACTURE MORPHOLOGY, SUBSEQUENT ENCOUNTER: ICD-10-CM

## 2020-07-07 PROCEDURE — 97110 THERAPEUTIC EXERCISES: CPT | Performed by: PHYSICAL THERAPIST

## 2020-07-07 PROCEDURE — 97112 NEUROMUSCULAR REEDUCATION: CPT | Performed by: PHYSICAL THERAPIST

## 2020-07-07 PROCEDURE — 97140 MANUAL THERAPY 1/> REGIONS: CPT | Performed by: PHYSICAL THERAPIST

## 2020-07-07 NOTE — PROGRESS NOTES
Daily Note     Today's date: 2020  Patient name: Lorie Clarke  : 1985  MRN: [id number]  Referring provider: Lilliam Rizvi MD  Dx:   Encounter Diagnosis     ICD-10-CM    1  Aftercare following surgery Z48 89    2  Type I or II open fracture of distal end of right tibia with routine healing, unspecified fracture morphology, subsequent encounter S82 301E        Start Time: 7948  Stop Time: 1530  Total time in clinic (min): 45 minutes    Subjective: Pt admits to walking around the house without the CAM boot; however, she has been using the RW when she does this  She denies any pain or discomfort with this  Objective: See treatment diary below      Assessment: Instructed patient to continue with the CAM walker during WB positions  Resumed TB inversion and eversion secondary to ankle weakness (4-/5 MMT in both planes)  Initiated the bicycle today to improve CV endurance and muscle performance in the quads  Increased weight with hamstring curls  Tolerated treatment well  Patient would benefit from continued PT  Plan: Continue per plan of care  Precautions: DDD, Myofascial pain syndrome      Manuals 6/30 7/1 7/3 7/7          PROM of the right knee              PROM of the R ankle 7' 5' 5' 5'          R PFJM              AP TC jt mobs 5' gr III 5' Gr III 5' Gr III 5' gr IV          Neuro Re-Ed              Quad sets              Single leg stance 5x10" FT support RLE in CAM boot             WB rocking with walker  FF WB in shoe -pain free side to side / forward backward 4'  FF WB in shoe -pain free side to side / forward backward 4'            Bridges              TKE with tb [place] 12# 30x    Jovanni 20# 30x          SLR flexion              Biodex WB %              LAQ 5# 2x10  5# 2x10  5# 2x10 5# 2x10          Total gym squats 3x10 as vincent 3x10 3x10 3x10          Standing BAPS board    Using RW for WB assit   30x AP, ML          Ther Ex              Ankle pumps              Ankle alphabet Gastroc stretch              Hamstring stretch              Heel prop              Soleus stretch              SLR abduction              SL clam shells              Ankle TB 4 ways    Inversion and eversion with BTB 30x          Hamstring curl -seated              Heel slides DC             Standing hip extension              Standing hip ABD              Standing hip flexion              NuStep 8' lvl 6  8' lvl 6  8' lvl 7 DC          SB gastroc stretch With RW 4x30" With RW 4x30" With RW 4x30" With RW 4x30"          SB soleus stretch With RW 4x30" With RW 4x30" With RW 4x30" With RW 4x30"          Prone quad stretch 4x30" 4x30" 4x30" 4x30"          Prone hamstring curls 2x10 3# 2x10 3# 2x10 3# 2x10 4#          Bicycle    8' lvl 5          Ther Activity              Stair negotiation              Lateral wallks              Step up  4" 2x10 with cueing 4" 1x10 w cueing, 6" 2x10 w cueing           Mini squats              Seated HR    30x          Gait Training              With walker or crutches              1 crutch Gait training              Modalities              CP PRN                                            1:1 with PT from [phone]

## 2020-07-08 ENCOUNTER — OFFICE VISIT (OUTPATIENT)
Dept: PHYSICAL THERAPY | Facility: CLINIC | Age: 35
End: 2020-07-08
Payer: OTHER MISCELLANEOUS

## 2020-07-08 DIAGNOSIS — Z48.89 AFTERCARE FOLLOWING SURGERY: Primary | ICD-10-CM

## 2020-07-08 DIAGNOSIS — S82.301E TYPE I OR II OPEN FRACTURE OF DISTAL END OF RIGHT TIBIA WITH ROUTINE HEALING, UNSPECIFIED FRACTURE MORPHOLOGY, SUBSEQUENT ENCOUNTER: ICD-10-CM

## 2020-07-08 PROCEDURE — 97530 THERAPEUTIC ACTIVITIES: CPT | Performed by: PHYSICAL THERAPIST

## 2020-07-08 PROCEDURE — 97110 THERAPEUTIC EXERCISES: CPT | Performed by: PHYSICAL THERAPIST

## 2020-07-08 PROCEDURE — 97112 NEUROMUSCULAR REEDUCATION: CPT | Performed by: PHYSICAL THERAPIST

## 2020-07-08 PROCEDURE — 97140 MANUAL THERAPY 1/> REGIONS: CPT | Performed by: PHYSICAL THERAPIST

## 2020-07-08 NOTE — PROGRESS NOTES
Daily Note     Today's date: 2020  Patient name: Candelaria Alvares  : 1985  MRN: [id number]  Referring provider: Elizabeth Garcia MD  Dx:   Encounter Diagnosis     ICD-10-CM    1  Aftercare following surgery Z48 89    2  Type I or II open fracture of distal end of right tibia with routine healing, unspecified fracture morphology, subsequent encounter S82 301E        Start Time:   Stop Time: 930  Total time in clinic (min): 52 minutes    Subjective: No new complaints as per patient  She is pleased with her progress  Objective: See treatment diary below      Assessment: Flexibility and gastroc/soleus complex is gradually improvement  Minimal right ankle pain or discomfort noted throughout treatment  Joint mobility of TC joint also improving  Pt is eager to progress her weight bearing without CAM boot  Tolerated treatment well  Patient would benefit from continued PT  Plan: Continue per plan of care  Precautions: DDD, Myofascial pain syndrome      Manuals  7/3 7/7 7/8         PROM of the right knee              PROM of the R ankle 7' 5' 5' 5' 5'         R PFJM              AP TC jt mobs 5' gr III 5' Gr III 5' Gr III 5' gr IV 5' gr IV         Neuro Re-Ed              Quad sets              Single leg stance 5x10" FT support RLE in CAM boot             WB rocking with walker  FF WB in shoe -pain free side to side / forward backward 4'  FF WB in shoe -pain free side to side / forward backward 4'            Bridges              TKE with tb [place] 12# 30x    [place] 20# 30x Jovanni 20# 30x         SLR flexion              Biodex WB %              LAQ 5# 2x10  5# 2x10  5# 2x10 5# 2x10 6# 2x10         Total gym squats 3x10 as vincent 3x10 3x10 3x10 3x10         Standing BAPS board    Using RW for WB assit  30x AP, ML Using RW for WB assit   30x AP, ML         Ther Ex              Ankle pumps              Ankle alphabet               Gastroc stretch              Hamstring stretch Heel prop              Soleus stretch              SLR abduction              SL clam shells              Ankle TB 4 ways    Inversion and eversion with BTB 30x Inversion and eversion with BTB 30x         Hamstring curl -seated              Heel slides DC             Standing hip extension              Standing hip ABD              Standing hip flexion              NuStep 8' lvl 6  8' lvl 6  8' lvl 7 DC          SB gastroc stretch With RW 4x30" With RW 4x30" With RW 4x30" With RW 4x30" With RW 4x30"         SB soleus stretch With RW 4x30" With RW 4x30" With RW 4x30" With RW 4x30" With RW 4x30"         Prone quad stretch 4x30" 4x30" 4x30" 4x30" 4x30"         Prone hamstring curls 2x10 3# 2x10 3# 2x10 3# 2x10 4# 3x10 4#         Bicycle    8' lvl 5 8' lvl 5         Ther Activity              Stair negotiation              Lateral wallks              Step up  4" 2x10 with cueing 4" 1x10 w cueing, 6" 2x10 w cueing           Mini squats              Seated HR    30x 30x with KB         Gait Training              With walker or crutches              1 crutch Gait training              Modalities              CP PRN                                            1:1 with PT from [phone]

## 2020-07-09 ENCOUNTER — TELEPHONE (OUTPATIENT)
Dept: OBGYN CLINIC | Facility: HOSPITAL | Age: 35
End: 2020-07-09

## 2020-07-09 NOTE — TELEPHONE ENCOUNTER
Patient sees Dr Alexander Santoro  Work comp is calling to have an updated work status note written and faxed to them from the visit on 06/29   Please fax to #[phone]

## 2020-07-10 ENCOUNTER — OFFICE VISIT (OUTPATIENT)
Dept: PHYSICAL THERAPY | Facility: CLINIC | Age: 35
End: 2020-07-10
Payer: OTHER MISCELLANEOUS

## 2020-07-10 DIAGNOSIS — Z48.89 AFTERCARE FOLLOWING SURGERY: Primary | ICD-10-CM

## 2020-07-10 DIAGNOSIS — S82.301E TYPE I OR II OPEN FRACTURE OF DISTAL END OF RIGHT TIBIA WITH ROUTINE HEALING, UNSPECIFIED FRACTURE MORPHOLOGY, SUBSEQUENT ENCOUNTER: ICD-10-CM

## 2020-07-10 DIAGNOSIS — S82.301B TYPE I OR II OPEN FRACTURE OF DISTAL END OF RIGHT TIBIA, UNSPECIFIED FRACTURE MORPHOLOGY, INITIAL ENCOUNTER: ICD-10-CM

## 2020-07-10 PROCEDURE — 97530 THERAPEUTIC ACTIVITIES: CPT | Performed by: PHYSICAL THERAPIST

## 2020-07-10 PROCEDURE — 97140 MANUAL THERAPY 1/> REGIONS: CPT | Performed by: PHYSICAL THERAPIST

## 2020-07-10 PROCEDURE — 97110 THERAPEUTIC EXERCISES: CPT | Performed by: PHYSICAL THERAPIST

## 2020-07-10 PROCEDURE — 97112 NEUROMUSCULAR REEDUCATION: CPT | Performed by: PHYSICAL THERAPIST

## 2020-07-10 NOTE — PROGRESS NOTES
Daily Note     Today's date: 7/10/2020  Patient name: Shonna Moeller  : 1985  MRN: [id number]  Referring provider: Rose Cleary MD  Dx:   Encounter Diagnosis     ICD-10-CM    1  Aftercare following surgery Z48 89    2  Type I or II open fracture of distal end of right tibia with routine healing, unspecified fracture morphology, subsequent encounter S82 301E    3  Type I or II open fracture of distal end of right tibia, unspecified fracture morphology, initial encounter S82 301B        Start Time: 1030  Stop Time: 1115  Total time in clinic (min): 45 minutes  The patient was treated by JE Choudhury under direct supervision of Geri Oviedo DPT    Subjective: Pt reports feeling good today and that she walked in the pool yesterday without her boot and felt fine  Objective: See treatment diary below      Assessment: Pt demonstrated improved talocrural joint mobility and ROM following manual treatment  Pt tolerated weight bearing through her R LE well during BAPS board exercises and calf stretch  Pt had adequate quadriceps contraction with LAQ with little fatigue towards the end of the exercise  Pt demonstrates improved knee extension AROM/quad strength during TKEs  Pt's gait without the boot with RW shows improved heel strike however still limited push off  Pt would benefit from continued therapy in order to improve joint mobility, strength, and functional mobility  Plan: Continue per plan of care   Increase weight for both quad extension and hamstring curls     Precautions: DDD, Myofascial pain syndrome      Manuals 6/30 7/1 7/3 7/7 7/8 7/10       PROM of the right knee             PROM of the R ankle 7' 5' 5' 5' 5' 5'       R PFJM             AP TC jt mobs 5' gr III 5' Gr III 5' Gr III 5' gr IV 5' gr IV 5' gr IV       Neuro Re-Ed             Quad sets             Single leg stance 5x10" FT support RLE in CAM boot            WB rocking with walker  FF WB in shoe -pain free side to side / forward backward 4'  FF WB in shoe -pain free side to side / forward backward 4'           Bridges             TKE with tb [place] 12# 30x    [place] 20# 30x [place] 20# 30x [place] 20# 30x       SLR flexion             Biodex WB %             LAQ 5# 2x10  5# 2x10  5# 2x10 5# 2x10 6# 2x10  6# 2x10       Total gym squats 3x10 as vincent 3x10 3x10 3x10 3x10 3x10       Standing BAPS board    Using RW for WB assit  30x AP, ML Using RW for WB assit  30x AP, ML Using RW for WB assit   30x AP, ML       Ther Ex             Ankle pumps             Ankle alphabet              Gastroc stretch             Hamstring stretch             Heel prop             Soleus stretch             SLR abduction             SL clam shells             Ankle TB 4 ways    Inversion and eversion with BTB 30x Inversion and eversion with BTB 30x Inversion and eversion with BTB 30x       Hamstring curl -seated             Heel slides DC            Standing hip extension             Standing hip ABD             Standing hip flexion             NuStep 8' lvl 6  8' lvl 6  8' lvl 7 DC         SB gastroc stretch With RW 4x30" With RW 4x30" With RW 4x30" With RW 4x30" With RW 4x30" With RW 4x30"       SB soleus stretch With RW 4x30" With RW 4x30" With RW 4x30" With RW 4x30" With RW 4x30" With RW 4x30"       Prone quad stretch 4x30" 4x30" 4x30" 4x30" 4x30" 4x30"       Prone hamstring curls 2x10 3# 2x10 3# 2x10 3# 2x10 4# 3x10 4# 3x10 4#       Bicycle    8' lvl 5 8' lvl 5 8' lvl 5       Ther Activity             Stair negotiation             Lateral wallks             Step up  4" 2x10 with cueing 4" 1x10 w cueing, 6" 2x10 w cueing          Mini squats             Seated HR    30x 30x with KB 30x with KB       Gait Training             With walker or crutches             1 crutch Gait training             Modalities             CP PRN                                         1:1 with PT from [id number]

## 2020-07-13 ENCOUNTER — OFFICE VISIT (OUTPATIENT)
Dept: PHYSICAL THERAPY | Facility: CLINIC | Age: 35
End: 2020-07-13
Payer: OTHER MISCELLANEOUS

## 2020-07-13 DIAGNOSIS — S82.301B TYPE I OR II OPEN FRACTURE OF DISTAL END OF RIGHT TIBIA, UNSPECIFIED FRACTURE MORPHOLOGY, INITIAL ENCOUNTER: ICD-10-CM

## 2020-07-13 DIAGNOSIS — S82.301E TYPE I OR II OPEN FRACTURE OF DISTAL END OF RIGHT TIBIA WITH ROUTINE HEALING, UNSPECIFIED FRACTURE MORPHOLOGY, SUBSEQUENT ENCOUNTER: ICD-10-CM

## 2020-07-13 DIAGNOSIS — Z48.89 AFTERCARE FOLLOWING SURGERY: Primary | ICD-10-CM

## 2020-07-13 PROCEDURE — 97110 THERAPEUTIC EXERCISES: CPT | Performed by: PHYSICAL THERAPIST

## 2020-07-13 PROCEDURE — 97140 MANUAL THERAPY 1/> REGIONS: CPT | Performed by: PHYSICAL THERAPIST

## 2020-07-13 NOTE — PROGRESS NOTES
Daily Note     Today's date: 2020  Patient name: Lashell Palmer  : 1985  MRN: [id number]  Referring provider: Swapna Begum MD  Dx:   Encounter Diagnosis     ICD-10-CM    1  Aftercare following surgery Z48 89    2  Type I or II open fracture of distal end of right tibia with routine healing, unspecified fracture morphology, subsequent encounter S82 301E    3  Type I or II open fracture of distal end of right tibia, unspecified fracture morphology, initial encounter S82 301B               Subjective: patient reports she didn't do much over the weekend  Using CAM boot without assistive device      Objective: See treatment diary below      Assessment: Tolerated treatment well  Patient exhibited good technique with therapeutic exercises and would benefit from continued PT  Dorsiflexion ROM still restricted on the right with a tendency to horizontally adduct will performing it  No pain in the lower leg with standing activities except for stiffness in the ankle  Plan: Progress treatment as tolerated  Precautions: DDD, Myofascial pain syndrome      Manuals 6/30 7/1 7/3 7/7 7/8 7/10 7/13      PROM of the right knee             PROM of the R ankle 7' 5' 5' 5' 5' 5' 9'      R PFJM             AP TC jt mobs 5' gr III 5' Gr III 5' Gr III 5' gr IV 5' gr IV 5' gr IV 6'      Neuro Re-Ed             Quad sets             Single leg stance 5x10" FT support RLE in CAM boot            WB rocking with walker  FF WB in shoe -pain free side to side / forward backward 4'  FF WB in shoe -pain free side to side / forward backward 4'           Bridges             TKE with tb [place] 12# 30x    [place] 20# 30x Jovanni 20# 30x [place] 20# 30x [place] 20# 30x      SLR flexion             Biodex WB %             LAQ 5# 2x10  5# 2x10  5# 2x10 5# 2x10 6# 2x10  6# 2x10 #6 3x10      Total gym squats 3x10 as vincent 3x10 3x10 3x10 3x10 3x10 3x10      Standing BAPS board    Using RW for WB assit   30x AP, ML Using RW for WB assit  30x AP, ML Using RW for WB assit  30x AP, ML Using RW for WB assit   30x AP, ML      Ther Ex             Ankle pumps             Ankle alphabet              Gastroc stretch             Hamstring stretch             Heel prop             Soleus stretch             SLR abduction             SL clam shells             Ankle TB 4 ways    Inversion and eversion with BTB 30x Inversion and eversion with BTB 30x Inversion and eversion with BTB 30x nversion and eversion with BTB 30x      Hamstring curl -seated             Heel slides DC            Standing hip extension             Standing hip ABD             Standing hip flexion             NuStep 8' lvl 6  8' lvl 6  8' lvl 7 DC         SB gastroc stretch With RW 4x30" With RW 4x30" With RW 4x30" With RW 4x30" With RW 4x30" With RW 4x30" With RW 4x30"      SB soleus stretch With RW 4x30" With RW 4x30" With RW 4x30" With RW 4x30" With RW 4x30" With RW 4x30" With RW 4x30"      Prone quad stretch 4x30" 4x30" 4x30" 4x30" 4x30" 4x30"       Prone hamstring curls 2x10 3# 2x10 3# 2x10 3# 2x10 4# 3x10 4# 3x10 4# standing #6 20      Bicycle    8' lvl 5 8' lvl 5 8' lvl 5 8' lvl 5      Ther Activity             Stair negotiation             Lateral wallks             Step up  4" 2x10 with cueing 4" 1x10 w cueing, 6" 2x10 w cueing          Mini squats             Seated HR    30x 30x with KB 30x with KB 30x with black KB      Gait Training             With walker or crutches             1 crutch Gait training             Modalities             CP PRN                                         1:1 with PT from [id number]

## 2020-07-14 ENCOUNTER — TELEPHONE (OUTPATIENT)
Dept: OBGYN CLINIC | Facility: HOSPITAL | Age: 35
End: 2020-07-14

## 2020-07-14 NOTE — PROGRESS NOTES
Daily Note     Today's date: 7/15/2020  Patient name: Zelalem Smart  : 1985  MRN: [id number]   Referring provider: Edgar Purvis MD  Dx:   Encounter Diagnosis     ICD-10-CM    1  Aftercare following surgery Z48 89    2  Type I or II open fracture of distal end of right tibia with routine healing, unspecified fracture morphology, subsequent encounter S82 301E    3  Type I or II open fracture of distal end of right tibia, unspecified fracture morphology, initial encounter S82 301B        Start Time: 845  Stop Time: 930  Total time in clinic (min): 45 minutes  The patient was treated by JE Choudhury under direct supervision of Luis A Bowman DPT    Subjective: Pt repots no new complaints and that her foot is feeling pretty good  Objective: See treatment diary below      Assessment: Tolerated treatment well and was able to complete all reps of interventions with increased resistance  Step up lunge pulses were added to pt's program to promote increased WB tolerance, ROM, and joint mobility  Pt continues to progress well with strengthening and should continue to increase resistance as tolerated  Patient demonstrated fatigue post treatment, exhibited good technique with therapeutic exercises and would benefit from continued PT      Plan: Continue per plan of care        Precautions: DDD, Myofascial pain syndrome      Manuals 6/30 7/1 7/3 7/7 7/8 7/10 7/13 7/15     PROM of the right knee             PROM of the R ankle 7' 5' 5' 5' 5' 5' 9' 5'     R PFJM             AP TC jt mobs 5' gr III 5' Gr III 5' Gr III 5' gr IV 5' gr IV 5' gr IV 6' 5'     Neuro Re-Ed             Quad sets             Single leg stance 5x10" FT support RLE in CAM boot            WB rocking with walker  FF WB in shoe -pain free side to side / forward backward 4'  FF WB in shoe -pain free side to side / forward backward 4'           Bridges             TKE with tb [place] 12# 30x    Jovanni 20# 30x [place] 20# 30x [place] 20# 30x [place] 20# 30x [place] 22# 30x     SLR flexion             Biodex WB %             LAQ 5# 2x10  5# 2x10  5# 2x10 5# 2x10 6# 2x10  6# 2x10 #6 3x10 #7 5 3x10     Total gym squats 3x10 as vincent 3x10 3x10 3x10 3x10 3x10 3x10 3x10 w 10# KB     Standing BAPS board    Using RW for WB assit  30x AP, ML Using RW for WB assit  30x AP, ML Using RW for WB assit  30x AP, ML Using RW for WB assit   30x AP, ML      Step up [place] pulses on step w RW        3x15 ea, 6" step     Ther Ex             Ankle pumps             Ankle alphabet              Gastroc stretch             Hamstring stretch             Heel prop             Soleus stretch             SLR abduction             SL clam shells             Ankle TB 4 ways    Inversion and eversion with BTB 30x Inversion and eversion with BTB 30x Inversion and eversion with BTB 30x nversion and eversion with BTB 30x inversion and eversion with BTB 30x     Hamstring curl -seated             Heel slides DC            Standing hip extension             Standing hip ABD             Standing hip flexion             NuStep 8' lvl 6  8' lvl 6  8' lvl 7 DC         SB gastroc stretch With RW 4x30" With RW 4x30" With RW 4x30" With RW 4x30" With RW 4x30" With RW 4x30" With RW 4x30" With RW 4x30"     SB soleus stretch With RW 4x30" With RW 4x30" With RW 4x30" With RW 4x30" With RW 4x30" With RW 4x30" With RW 4x30" With RW 4x30"     Prone quad stretch 4x30" 4x30" 4x30" 4x30" 4x30" 4x30"       Prone hamstring curls 2x10 3# 2x10 3# 2x10 3# 2x10 4# 3x10 4# 3x10 4# standing #6 20 standing #6 20     Bicycle    8' lvl 5 8' lvl 5 8' lvl 5 8' lvl 5 8' lvl 5     Ther Activity             Stair negotiation             Lateral wallks             Step up  4" 2x10 with cueing 4" 1x10 w cueing, 6" 2x10 w cueing          Mini squats             Seated HR    30x 30x with KB 30x with KB 30x with black KB 30x with black KB dumbbell     Gait Training             With walker or crutches             1 crutch Gait training Modalities             CP PRN                                         1:1 with PT from [id number]

## 2020-07-14 NOTE — TELEPHONE ENCOUNTER
Patient sees Dr Yaima Crespo  [employer] Manager is calling for office notes from 6/24 to be faxed to [phone]   Faxed per request

## 2020-07-15 ENCOUNTER — OFFICE VISIT (OUTPATIENT)
Dept: PHYSICAL THERAPY | Facility: CLINIC | Age: 35
End: 2020-07-15
Payer: OTHER MISCELLANEOUS

## 2020-07-15 DIAGNOSIS — S82.301E TYPE I OR II OPEN FRACTURE OF DISTAL END OF RIGHT TIBIA WITH ROUTINE HEALING, UNSPECIFIED FRACTURE MORPHOLOGY, SUBSEQUENT ENCOUNTER: ICD-10-CM

## 2020-07-15 DIAGNOSIS — Z48.89 AFTERCARE FOLLOWING SURGERY: Primary | ICD-10-CM

## 2020-07-15 DIAGNOSIS — S82.301B TYPE I OR II OPEN FRACTURE OF DISTAL END OF RIGHT TIBIA, UNSPECIFIED FRACTURE MORPHOLOGY, INITIAL ENCOUNTER: ICD-10-CM

## 2020-07-15 PROCEDURE — 97110 THERAPEUTIC EXERCISES: CPT | Performed by: PHYSICAL THERAPIST

## 2020-07-15 PROCEDURE — 97112 NEUROMUSCULAR REEDUCATION: CPT | Performed by: PHYSICAL THERAPIST

## 2020-07-15 PROCEDURE — 97140 MANUAL THERAPY 1/> REGIONS: CPT | Performed by: PHYSICAL THERAPIST

## 2020-07-17 ENCOUNTER — OFFICE VISIT (OUTPATIENT)
Dept: PHYSICAL THERAPY | Facility: CLINIC | Age: 35
End: 2020-07-17
Payer: OTHER MISCELLANEOUS

## 2020-07-17 DIAGNOSIS — S82.301B TYPE I OR II OPEN FRACTURE OF DISTAL END OF RIGHT TIBIA, UNSPECIFIED FRACTURE MORPHOLOGY, INITIAL ENCOUNTER: ICD-10-CM

## 2020-07-17 DIAGNOSIS — Z48.89 AFTERCARE FOLLOWING SURGERY: Primary | ICD-10-CM

## 2020-07-17 DIAGNOSIS — S82.301E TYPE I OR II OPEN FRACTURE OF DISTAL END OF RIGHT TIBIA WITH ROUTINE HEALING, UNSPECIFIED FRACTURE MORPHOLOGY, SUBSEQUENT ENCOUNTER: ICD-10-CM

## 2020-07-17 PROCEDURE — 97112 NEUROMUSCULAR REEDUCATION: CPT

## 2020-07-17 PROCEDURE — 97140 MANUAL THERAPY 1/> REGIONS: CPT

## 2020-07-17 PROCEDURE — 97110 THERAPEUTIC EXERCISES: CPT

## 2020-07-17 NOTE — PROGRESS NOTES
Daily Note     Today's date: 2020  Patient name: Chuck Huitron  : 1985  MRN: [id number]  Referring provider: Bryon Arrington MD  Dx:   Encounter Diagnosis     ICD-10-CM    1  Aftercare following surgery Z48 89    2  Type I or II open fracture of distal end of right tibia with routine healing, unspecified fracture morphology, subsequent encounter S82 301E    3  Type I or II open fracture of distal end of right tibia, unspecified fracture morphology, initial encounter S82 301B                   Subjective: Patient offers no new complaints and denies soreness after last session  Objective: See treatment diary below      Assessment: Tolerated treatment well  Good tolerance to manual therapy, continues to have restriction into DF  No pain reported throughout treatment  Patient exhibited good technique with therapeutic exercises and would benefit from continued PT      Plan: Progress treatment as tolerated  Precautions: DDD, Myofascial pain syndrome      Manuals 6/30 7/1 7/3 7/7 7/8 7/10 7/13 7/15 7/17    PROM of the right knee             PROM of the R ankle 7' 5' 5' 5' 5' 5' 9' 5' 6'    R PFJM             AP TC jt mobs 5' gr III 5' Gr III 5' Gr III 5' gr IV 5' gr IV 5' gr IV 6' 5' 4' SRB    Neuro Re-Ed             Quad sets             Single leg stance 5x10" FT support RLE in CAM boot            WB rocking with walker  FF WB in shoe -pain free side to side / forward backward 4'  FF WB in shoe -pain free side to side / forward backward 4'           Bridges             TKE with tb [place] 12# 30x    [place] 20# 30x [place] 20# 30x J[place] 20# 30x [place] 20# 30x [place] 22# 30x [place] 22# 30x    SLR flexion             Biodex WB %             LAQ 5# 2x10  5# 2x10  5# 2x10 5# 2x10 6# 2x10  6# 2x10 #6 3x10 #7 5 3x10 7 5# 3x10    Total gym squats 3x10 as vincent 3x10 3x10 3x10 3x10 3x10 3x10 3x10 w 10# KB  3x10     Standing BAPS board    Using RW for WB assit  30x AP, ML Using RW for WB assit   30x AP, ML Using RW for WB assit  30x AP, ML Using RW for WB assit   30x AP, ML      Step up Lunge pulses on step w RW        3x15 ea, 6" step 3x15 ea, 6" step    Ther Ex             Ankle pumps             Ankle alphabet              Gastroc stretch             Hamstring stretch             Heel prop             Soleus stretch             SLR abduction             SL clam shells             Ankle TB 4 ways    Inversion and eversion with BTB 30x Inversion and eversion with BTB 30x Inversion and eversion with BTB 30x nversion and eversion with BTB 30x inversion and eversion with BTB 30x inversion and eversion with BTB 30x    Hamstring curl -seated             Heel slides DC            Standing hip extension             Standing hip ABD             Standing hip flexion             NuStep 8' lvl 6  8' lvl 6  8' lvl 7 DC         SB gastroc stretch With RW 4x30" With RW 4x30" With RW 4x30" With RW 4x30" With RW 4x30" With RW 4x30" With RW 4x30" With RW 4x30" /c RW :30x4    SB soleus stretch With RW 4x30" With RW 4x30" With RW 4x30" With RW 4x30" With RW 4x30" With RW 4x30" With RW 4x30" With RW 4x30" /c RW :30x4    Prone quad stretch 4x30" 4x30" 4x30" 4x30" 4x30" 4x30"       Prone hamstring curls 2x10 3# 2x10 3# 2x10 3# 2x10 4# 3x10 4# 3x10 4# standing #6 20 standing #6 20 Standing 6# 30    Bicycle    8' lvl 5 8' lvl 5 8' lvl 5 8' lvl 5 8' lvl 5 8' L5    Ther Activity             Stair negotiation             Lateral wallks             Step up  4" 2x10 with cueing 4" 1x10 w cueing, 6" 2x10 w cueing          Mini squats             Seated HR    30x 30x with KB 30x with KB 30x with black KB 30x with black KB dumbbell 30x with black KB dumbbell    Gait Training             With walker or crutches             1 crutch Gait training             Modalities             CP PRN

## 2020-07-20 ENCOUNTER — APPOINTMENT (OUTPATIENT)
Dept: PHYSICAL THERAPY | Facility: CLINIC | Age: 35
End: 2020-07-20
Payer: OTHER MISCELLANEOUS

## 2020-07-21 ENCOUNTER — OFFICE VISIT (OUTPATIENT)
Dept: FAMILY MEDICINE CLINIC | Facility: CLINIC | Age: 35
End: 2020-07-21
Payer: COMMERCIAL

## 2020-07-21 VITALS
HEART RATE: 95 BPM | RESPIRATION RATE: 16 BRPM | OXYGEN SATURATION: 98 % | TEMPERATURE: 94.7 F | SYSTOLIC BLOOD PRESSURE: 100 MMHG | DIASTOLIC BLOOD PRESSURE: 60 MMHG

## 2020-07-21 DIAGNOSIS — F41.9 ANXIETY AND DEPRESSION: ICD-10-CM

## 2020-07-21 DIAGNOSIS — F32.A ANXIETY AND DEPRESSION: ICD-10-CM

## 2020-07-21 DIAGNOSIS — F43.10 PTSD (POST-TRAUMATIC STRESS DISORDER): ICD-10-CM

## 2020-07-21 DIAGNOSIS — E04.0 DIFFUSE NONTOXIC GOITER: ICD-10-CM

## 2020-07-21 DIAGNOSIS — N87.1 MODERATE DYSPLASIA OF CERVIX (CIN II): ICD-10-CM

## 2020-07-21 DIAGNOSIS — D50.8 OTHER IRON DEFICIENCY ANEMIA: ICD-10-CM

## 2020-07-21 DIAGNOSIS — J45.40 MODERATE PERSISTENT ASTHMA WITHOUT COMPLICATION: ICD-10-CM

## 2020-07-21 DIAGNOSIS — Z76.89 ENCOUNTER TO ESTABLISH CARE WITH NEW DOCTOR: Primary | ICD-10-CM

## 2020-07-21 DIAGNOSIS — E78.49 OTHER HYPERLIPIDEMIA: ICD-10-CM

## 2020-07-21 DIAGNOSIS — Z23 NEED FOR VACCINATION: ICD-10-CM

## 2020-07-21 PROBLEM — J45.909 ASTHMA: Status: ACTIVE | Noted: 2020-07-21

## 2020-07-21 PROBLEM — E04.9 ENLARGED THYROID: Status: RESOLVED | Noted: 2018-02-09 | Resolved: 2020-07-21

## 2020-07-21 PROBLEM — Z01.419 ENCOUNTER FOR GYNECOLOGICAL EXAMINATION (GENERAL) (ROUTINE) WITHOUT ABNORMAL FINDINGS: Status: RESOLVED | Noted: 2019-02-14 | Resolved: 2020-07-21

## 2020-07-21 PROBLEM — Z11.3 SCREENING EXAMINATION FOR STD (SEXUALLY TRANSMITTED DISEASE): Status: RESOLVED | Noted: 2019-05-15 | Resolved: 2020-07-21

## 2020-07-21 PROCEDURE — 90471 IMMUNIZATION ADMIN: CPT

## 2020-07-21 PROCEDURE — 99204 OFFICE O/P NEW MOD 45 MIN: CPT | Performed by: FAMILY MEDICINE

## 2020-07-21 PROCEDURE — 90732 PPSV23 VACC 2 YRS+ SUBQ/IM: CPT

## 2020-07-21 PROCEDURE — 1036F TOBACCO NON-USER: CPT | Performed by: FAMILY MEDICINE

## 2020-07-21 RX ORDER — BUDESONIDE AND FORMOTEROL FUMARATE DIHYDRATE 160; 4.5 UG/1; UG/1
2 AEROSOL RESPIRATORY (INHALATION) DAILY
Qty: 1 INHALER | Refills: 1 | Status: SHIPPED | OUTPATIENT
Start: 2020-07-21 | End: 2020-08-21

## 2020-07-21 RX ORDER — FLUTICASONE PROPIONATE 50 MCG
1 SPRAY, SUSPENSION (ML) NASAL DAILY PRN
Qty: 1 BOTTLE | Refills: 5 | Status: SHIPPED | OUTPATIENT
Start: 2020-07-21 | End: 2020-12-21 | Stop reason: SDUPTHER

## 2020-07-21 RX ORDER — VENLAFAXINE HYDROCHLORIDE 75 MG/1
75 CAPSULE, EXTENDED RELEASE ORAL
Qty: 30 CAPSULE | Refills: 5 | Status: SHIPPED | OUTPATIENT
Start: 2020-07-21 | End: 2020-08-27
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
## 2020-07-22 ENCOUNTER — OFFICE VISIT (OUTPATIENT)
Dept: PHYSICAL THERAPY | Facility: CLINIC | Age: 35
End: 2020-07-22
Payer: OTHER MISCELLANEOUS

## 2020-07-22 DIAGNOSIS — S82.301B TYPE I OR II OPEN FRACTURE OF DISTAL END OF RIGHT TIBIA, UNSPECIFIED FRACTURE MORPHOLOGY, INITIAL ENCOUNTER: ICD-10-CM

## 2020-07-22 DIAGNOSIS — S82.301E TYPE I OR II OPEN FRACTURE OF DISTAL END OF RIGHT TIBIA WITH ROUTINE HEALING, UNSPECIFIED FRACTURE MORPHOLOGY, SUBSEQUENT ENCOUNTER: ICD-10-CM

## 2020-07-22 DIAGNOSIS — Z48.89 AFTERCARE FOLLOWING SURGERY: Primary | ICD-10-CM

## 2020-07-22 PROCEDURE — 97110 THERAPEUTIC EXERCISES: CPT | Performed by: PHYSICAL THERAPIST

## 2020-07-22 PROCEDURE — 97140 MANUAL THERAPY 1/> REGIONS: CPT | Performed by: PHYSICAL THERAPIST

## 2020-07-22 NOTE — PROGRESS NOTES
Daily Note     Today's date: 2020  Patient name: Candelaria Alvares  : 1985  MRN: [id number]  Referring provider: Elizabeth Garcia MD  Dx:   Encounter Diagnosis     ICD-10-CM    1  Aftercare following surgery Z48 89    2  Type I or II open fracture of distal end of right tibia with routine healing, unspecified fracture morphology, subsequent encounter S82 301E    3  Type I or II open fracture of distal end of right tibia, unspecified fracture morphology, initial encounter S82 301B        Start Time: 933  Stop Time:   Total time in clinic (min): 42 minutes    Subjective: Pt reports mild "throbbing" discomfort at fracture site when boot is on  She also reports swelling over this region that occurred 3 days ago but as she iced it and and decreased her exercises the swelling subsided  Objective: See treatment diary below      Assessment: Mild to moderate tenderness present in posteromedial muscles which attach to the tibia  Performed STM to this region as I noted increased muscle tone and mild swelling in this region  MT helped reduce this tenderness  Focused on flexibility today and reduced exercise intensity  Encouraged patient to continue to keep CAM boot on as instructed by Dr Bess Morales  Overall, ankle mobility and soft tissue flexibility of gastroc/soleus is improving  Tolerated treatment well  Resume normal program next visit or as tolerated  Plan: Continue per plan of care        Precautions: DDD, Myofascial pain syndrome      Manuals 6/30 7/1 7/3 7/7 7/8 7/10 7/13 7/15 7/17 7/22   PROM of the right knee             PROM of the R ankle 7' 5' 5' 5' 5' 5' 9' 5' 6' 6'   R PFJM             AP TC jt mobs 5' gr III 5' Gr III 5' Gr III 5' gr IV 5' gr IV 5' gr IV 6' 5' 4' SRB 6' Gr III   STM to posteromedial gastroc/soleus          4'   Neuro Re-Ed             Quad sets             Single leg stance 5x10" FT support RLE in CAM boot            WB rocking with walker  FF WB in shoe -pain free side to side / forward backward 4'  FF WB in shoe -pain free side to side / forward backward 4'           Bridges             TKE with tb J[place] 12# 30x    Jovanni 20# 30x [place] 20# 30x Jovanni 20# 30x [place] 20# 30x [place] 22# 30x J[place] 22# 30x    SLR flexion             Biodex WB %             LAQ 5# 2x10  5# 2x10  5# 2x10 5# 2x10 6# 2x10  6# 2x10 #6 3x10 #7 5 3x10 7 5# 3x10    Total gym squats 3x10 as vincent 3x10 3x10 3x10 3x10 3x10 3x10 3x10 w 10# KB  3x10     Standing BAPS board    Using RW for WB assit  30x AP, ML Using RW for WB assit  30x AP, ML Using RW for WB assit  30x AP, ML Using RW for WB assit   30x AP, ML      Step up Lunge pulses on step w RW        3x15 ea, 6" step 3x15 ea, 6" step    Ther Ex             Ankle pumps             Ankle alphabet              Gastroc stretch             Hamstring stretch             Heel prop             Soleus stretch             SLR abduction             SL clam shells             Ankle TB 4 ways    Inversion and eversion with BTB 30x Inversion and eversion with BTB 30x Inversion and eversion with BTB 30x nversion and eversion with BTB 30x inversion and eversion with BTB 30x inversion and eversion with BTB 30x    Hamstring curl -seated             Heel slides DC            Standing hip extension             Standing hip ABD             Standing hip flexion             NuStep 8' lvl 6  8' lvl 6  8' lvl 7 DC         SB gastroc stretch With RW 4x30" With RW 4x30" With RW 4x30" With RW 4x30" With RW 4x30" With RW 4x30" With RW 4x30" With RW 4x30" /c RW :30x4 With RW 4x30"   SB soleus stretch With RW 4x30" With RW 4x30" With RW 4x30" With RW 4x30" With RW 4x30" With RW 4x30" With RW 4x30" With RW 4x30" /c RW :30x4 With RW 4x30"   Prone quad stretch 4x30" 4x30" 4x30" 4x30" 4x30" 4x30"    4x30"   Prone hamstring curls 2x10 3# 2x10 3# 2x10 3# 2x10 4# 3x10 4# 3x10 4# standing #6 20 standing #6 20 Standing 6# 30    Bicycle    8' lvl 5 8' lvl 5 8' lvl 5 8' lvl 5 8' lvl 5 8' L5 8' lvl 6    Ther Activity             Stair negotiation             Lateral wallks             Step up  4" 2x10 with cueing 4" 1x10 w cueing, 6" 2x10 w cueing          Mini squats             Seated HR    30x 30x with KB 30x with KB 30x with black KB 30x with black KB dumbbell 30x with black KB dumbbell 30x with blk KB   Gait Training             With walker or crutches             1 crutch Gait training             Modalities             CP PRN                                                      [phone]

## 2020-07-24 ENCOUNTER — OFFICE VISIT (OUTPATIENT)
Dept: PHYSICAL THERAPY | Facility: CLINIC | Age: 35
End: 2020-07-24
Payer: OTHER MISCELLANEOUS

## 2020-07-24 DIAGNOSIS — S82.301E TYPE I OR II OPEN FRACTURE OF DISTAL END OF RIGHT TIBIA WITH ROUTINE HEALING, UNSPECIFIED FRACTURE MORPHOLOGY, SUBSEQUENT ENCOUNTER: ICD-10-CM

## 2020-07-24 DIAGNOSIS — Z48.89 AFTERCARE FOLLOWING SURGERY: Primary | ICD-10-CM

## 2020-07-24 DIAGNOSIS — S82.301B TYPE I OR II OPEN FRACTURE OF DISTAL END OF RIGHT TIBIA, UNSPECIFIED FRACTURE MORPHOLOGY, INITIAL ENCOUNTER: ICD-10-CM

## 2020-07-24 PROCEDURE — 97140 MANUAL THERAPY 1/> REGIONS: CPT | Performed by: PHYSICAL THERAPIST

## 2020-07-24 PROCEDURE — 97110 THERAPEUTIC EXERCISES: CPT | Performed by: PHYSICAL THERAPIST

## 2020-07-24 PROCEDURE — 97112 NEUROMUSCULAR REEDUCATION: CPT | Performed by: PHYSICAL THERAPIST

## 2020-07-24 NOTE — PROGRESS NOTES
Daily Note     Today's date: 2020  Patient name: Keith Pena  : 1985  MRN: [id number]  Referring provider: Shelbi Anderson MD  Dx:   Encounter Diagnosis     ICD-10-CM    1  Aftercare following surgery Z48 89    2  Type I or II open fracture of distal end of right tibia with routine healing, unspecified fracture morphology, subsequent encounter S82 301E    3  Type I or II open fracture of distal end of right tibia, unspecified fracture morphology, initial encounter S82 301B        Start Time: 943  Stop Time:   Total time in clinic (min): 47 minutes    Subjective: Pt note decreased symptoms in RLE at fracture site since last session  Objective: See treatment diary below      Assessment: Minimal antaglic gait present in CAM boot  Reduced swelling and improved soft tissue extensibility/tone at posteromedial muscles of the RLE  Gradually increased resisted and WB exercises today  Tolerated treatment well  Pt eager to d/c CAM boot  Patient would benefit from continued PT  Plan: Continue per plan of care  Precautions: DDD, Myofascial pain syndrome      Manuals 7/24     7/10 7/13 7/15 7/17 7/22   PROM of the right knee             PROM of the R ankle 6'     5' 9' 5' 6' 6'   R PFJM             AP TC jt mobs 4' gr III+IV     5' gr IV 6' 5' 4' SRB 6' Gr III   STM to posteromedial gastroc/soleus 4'          4'   TC Dorsiflexion MWM  nv            Neuro Re-Ed             Quad sets             Single leg stance             WB rocking with walker             Bridges             TKE with tb      Jovanni 20# 30x Jovanni 20# 30x [place] 22# 30x [place] 22# 30x    SLR flexion             Biodex WB %             LAQ       6# 2x10 #6 3x10 #7 5 3x10 7 5# 3x10    Total gym squats      3x10 3x10 3x10 w 10# KB  3x10     Standing BAPS board      Using RW for WB assit  30x AP, ML Using RW for WB assit   30x AP, ML      Step up Lunge pulses on step w RW nv       3x15 ea, 6" step 3x15 ea, 6" step    Leg press 3x10 90# with boot             Ther Ex             Ankle pumps             Ankle alphabet              Gastroc stretch             Hamstring stretch             Heel prop             Soleus stretch             SLR abduction             SL clam shells             Ankle TB 4 ways inversion and eversion with BTB 30x     Inversion and eversion with BTB 30x nversion and eversion with BTB 30x inversion and eversion with BTB 30x inversion and eversion with BTB 30x    Hamstring curl -seated             Heel slides             Standing hip extension             Standing hip ABD             Standing hip flexion             NuStep             SB gastroc stretch With RW 4x30"     With RW 4x30" With RW 4x30" With RW 4x30" /c RW :30x4 With RW 4x30"   SB soleus stretch With RW 4x30"     With RW 4x30" With RW 4x30" With RW 4x30" /c RW :30x4 With RW 4x30"   Prone quad stretch nv     4x30"    4x30"   Prone hamstring curls      3x10 4# standing #6 20 standing #6 20 Standing 6# 30    Bicycle 8' lvl 5     8' lvl 5 8' lvl 5 8' lvl 5 8' L5 8' lvl 6    Ther Activity             Stair negotiation             Lateral wallks nv            Step up nv            Mini squats nv            Seated HR 30x with blk KB     30x with KB 30x with black KB 30x with black KB dumbbell 30x with black KB dumbbell 30x with blk KB   Gait Training             With walker or crutches             1 crutch Gait training             Modalities             CP PRN                                                      1:1 with PT from [phone]

## 2020-07-27 ENCOUNTER — APPOINTMENT (OUTPATIENT)
Dept: PHYSICAL THERAPY | Facility: CLINIC | Age: 35
End: 2020-07-27
Payer: OTHER MISCELLANEOUS

## 2020-07-28 ENCOUNTER — OFFICE VISIT (OUTPATIENT)
Dept: PHYSICAL THERAPY | Facility: CLINIC | Age: 35
End: 2020-07-28
Payer: OTHER MISCELLANEOUS

## 2020-07-28 DIAGNOSIS — S82.301B TYPE I OR II OPEN FRACTURE OF DISTAL END OF RIGHT TIBIA, UNSPECIFIED FRACTURE MORPHOLOGY, INITIAL ENCOUNTER: ICD-10-CM

## 2020-07-28 DIAGNOSIS — S82.301E TYPE I OR II OPEN FRACTURE OF DISTAL END OF RIGHT TIBIA WITH ROUTINE HEALING, UNSPECIFIED FRACTURE MORPHOLOGY, SUBSEQUENT ENCOUNTER: ICD-10-CM

## 2020-07-28 DIAGNOSIS — F41.9 ANXIETY: Primary | ICD-10-CM

## 2020-07-28 DIAGNOSIS — Z48.89 AFTERCARE FOLLOWING SURGERY: Primary | ICD-10-CM

## 2020-07-28 PROCEDURE — 97110 THERAPEUTIC EXERCISES: CPT | Performed by: PHYSICAL THERAPIST

## 2020-07-28 PROCEDURE — 97140 MANUAL THERAPY 1/> REGIONS: CPT | Performed by: PHYSICAL THERAPIST

## 2020-07-28 PROCEDURE — 97530 THERAPEUTIC ACTIVITIES: CPT | Performed by: PHYSICAL THERAPIST

## 2020-07-28 PROCEDURE — 97112 NEUROMUSCULAR REEDUCATION: CPT | Performed by: PHYSICAL THERAPIST

## 2020-07-28 RX ORDER — ALPRAZOLAM 0.5 MG/1
0.5 TABLET ORAL
Qty: 20 TABLET | Refills: 0 | Status: SHIPPED | OUTPATIENT
Start: 2020-07-28 | End: 2020-10-01 | Stop reason: SDUPTHER

## 2020-07-28 NOTE — PROGRESS NOTES
Daily Note     Today's date: 2020  Patient name: Nancy Quiroz  : 1985  MRN: [id number]  Referring provider: Heather London MD  Dx:   Encounter Diagnosis     ICD-10-CM    1  Aftercare following surgery Z48 89    2  Type I or II open fracture of distal end of right tibia with routine healing, unspecified fracture morphology, subsequent encounter S82 301E    3  Type I or II open fracture of distal end of right tibia, unspecified fracture morphology, initial encounter S82 301B        Start Time: 1015  Stop Time: 1100  Total time in clinic (min): 45 minutes    Subjective: Pt c/o mild discomfort a mid tibia secondary to CAM boot straps  She requested a heel lift in left shoe to help with walking secondary to CAM boot length      Objective: See treatment diary below      Assessment: Pt give heel lift which helped minimize gait compensation  Resumed WB exercises today with good tolerance  Pt able to perform a SLS in CAM boot and without discomfort, therefore, increased difficulty by adding soft yellow foam  Pt also admitted to walking a few steps without the CAM boot at home and she denied any pain  Pt should not do this often and should be very cautious as her fracture is still healing  Pt had difficulty with lateral step up secondary to ankle immobilized in CAM boot, which prevented appropriate mechanics  Overall, patient tolerated treatment well  Patient would benefit from continued PT  Plan: Continue per plan of care        Precautions: DDD, Myofascial pain syndrome      Manuals 7/24 7/28    7/10 7/13 7/15 7/17 7/22   PROM of the right knee             PROM of the R ankle 6' 5'    5' 9' 5' 6' 6'   R PFJM             AP TC jt mobs 4' gr III+IV 5' gr III+IV    5' gr IV 6' 5' 4' SRB 6' Gr III   STM to posteromedial gastroc/soleus 4'          4'   TC Dorsiflexion MWM  nv            Neuro Re-Ed             Quad sets             Single leg stance  C/ yellow foam 5x15" RLE            WB rocking with walker             Bridges             TKE with tb  [place] 25lbs 3x10     [place] 20# 30x Jovanni 20# 30x [place] 22# 30x [place] 22# 30x    SLR flexion             Biodex WB %             LAQ       6# 2x10 #6 3x10 #7 5 3x10 7 5# 3x10    Total gym squats      3x10 3x10 3x10 w 10# KB  3x10     Standing BAPS board      Using RW for WB assit  30x AP, ML Using RW for WB assit   30x AP, ML      Step up Lunge pulses on step w RW nv       3x15 ea, 6" step 3x15 ea, 6" step    Leg press 3x10 90# with boot  3x10 100# in boot            Ther Ex             Ankle pumps             Ankle alphabet              Gastroc stretch             Hamstring stretch             Heel prop             Soleus stretch             SLR abduction             SL clam shells             Ankle TB 4 ways inversion and eversion with BTB 30x     Inversion and eversion with BTB 30x nversion and eversion with BTB 30x inversion and eversion with BTB 30x inversion and eversion with BTB 30x    Hamstring curl -seated             Heel slides             Standing hip extension             Standing hip ABD             Standing hip flexion             NuStep             SB gastroc stretch With RW 4x30" With RW 4x30"    With RW 4x30" With RW 4x30" With RW 4x30" /c RW :30x4 With RW 4x30"   SB soleus stretch With RW 4x30" With RW 4x30"    With RW 4x30" With RW 4x30" With RW 4x30" /c RW :30x4 With RW 4x30"   Prone quad stretch nv     4x30"    4x30"   Prone hamstring curls      3x10 4# standing #6 20 standing #6 20 Standing 6# 30    Bicycle 8' lvl 5 8' lvl 5    8' lvl 5 8' lvl 5 8' lvl 5 8' L5 8' lvl 6    Ther Activity             Stair negotiation  2x           Lateral wallks nv            Step up nv 6" 2x10           Mini squats nv            Seated HR 30x with blk KB     30x with KB 30x with black KB 30x with black KB dumbbell 30x with black KB dumbbell 30x with blk KB   Lateral step ups  10x 4" -hold            Gait Training             With walker or crutches 1 crutch Gait training             Modalities             CP PRN                                                      1:1 with PT from [phone]

## 2020-07-31 ENCOUNTER — OFFICE VISIT (OUTPATIENT)
Dept: PHYSICAL THERAPY | Facility: CLINIC | Age: 35
End: 2020-07-31
Payer: OTHER MISCELLANEOUS

## 2020-07-31 DIAGNOSIS — Z48.89 AFTERCARE FOLLOWING SURGERY: ICD-10-CM

## 2020-07-31 DIAGNOSIS — S82.301B TYPE I OR II OPEN FRACTURE OF DISTAL END OF RIGHT TIBIA, UNSPECIFIED FRACTURE MORPHOLOGY, INITIAL ENCOUNTER: ICD-10-CM

## 2020-07-31 DIAGNOSIS — S82.301E TYPE I OR II OPEN FRACTURE OF DISTAL END OF RIGHT TIBIA WITH ROUTINE HEALING, UNSPECIFIED FRACTURE MORPHOLOGY, SUBSEQUENT ENCOUNTER: Primary | ICD-10-CM

## 2020-07-31 PROCEDURE — 97112 NEUROMUSCULAR REEDUCATION: CPT

## 2020-07-31 PROCEDURE — 97140 MANUAL THERAPY 1/> REGIONS: CPT

## 2020-07-31 PROCEDURE — 97110 THERAPEUTIC EXERCISES: CPT

## 2020-07-31 NOTE — PROGRESS NOTES
Daily Note     Today's date: 2020  Patient name: Dominique Granados  : 1985  MRN: [id number]  Referring provider: Juana Bird MD  Dx:   Encounter Diagnosis     ICD-10-CM    1  Type I or II open fracture of distal end of right tibia with routine healing, unspecified fracture morphology, subsequent encounter S82 301E    2  Type I or II open fracture of distal end of right tibia, unspecified fracture morphology, initial encounter S82 301B    3  Aftercare following surgery Z48 89                   Subjective: Patient reports follow up visit with ortho got moved up to 8/3 so that she can go away  She has not had significant pain since last visit and is eager to get out of CAM boot  Objective: See treatment diary below      Assessment: Tolerated treatment well  Performed all weight bearing activities in boot and initiated mini squats without difficulty or significant compensation  Continues to have significant PROM restrictions into all planes though good tolerance to MT  No pain reported throughout treatment  Patient exhibited good technique with therapeutic exercises and would benefit from continued PT      Plan: Progress treatment as tolerated         Precautions: DDD, Myofascial pain syndrome      Manuals 7/24 7/28 7/31   7/10 7/13 7/15 7/17 7/22   PROM of the right knee             PROM of the R ankle 6' 5' 8'   5' 9' 5' 6' 6'   R PFJM             AP TC jt mobs 4' gr III+IV 5' gr III+IV    5' gr IV 6' 5' 4' SRB 6' Gr III   STM to posteromedial gastroc/soleus 4'          4'   TC Dorsiflexion MWM  nv            Neuro Re-Ed             Quad sets             Single leg stance  C/ yellow foam 5x15" RLE  /c yellow foam :20x4 RLE          WB rocking with walker             Bridges             TKE with tb  [place] 25lbs 3x10  [place] 25# 3x10   [place] 20# 30x [place] 20# 30x [place] 22# 30x [place] 22# 30x    SLR flexion             Biodex WB %             LAQ       6# 2x10 #6 3x10 #7 5 3x10 7 5# 3x10    Total gym squats      3x10 3x10 3x10 w 10# KB  3x10     Standing BAPS board      Using RW for WB assit  30x AP, ML Using RW for WB assit   30x AP, ML      Step up Lunge pulses on step w RW nv       3x15 ea, 6" step 3x15 ea, 6" step    Leg press 3x10 90# with boot  3x10 100# in boot   100# 3x10 in boot          Ther Ex             Ankle pumps             Ankle alphabet              Gastroc stretch             Hamstring stretch             Heel prop             Soleus stretch             SLR abduction             SL clam shells             Ankle TB 4 ways inversion and eversion with BTB 30x     Inversion and eversion with BTB 30x nversion and eversion with BTB 30x inversion and eversion with BTB 30x inversion and eversion with BTB 30x    Hamstring curl -seated             Heel slides             Standing hip extension             Standing hip ABD             Standing hip flexion             NuStep             SB gastroc stretch With RW 4x30" With RW 4x30" With RW :30x4   With RW 4x30" With RW 4x30" With RW 4x30" /c RW :30x4 With RW 4x30"   SB soleus stretch With RW 4x30" With RW 4x30" With RW :30x4   With RW 4x30" With RW 4x30" With RW 4x30" /c RW :30x4 With RW 4x30"   Prone quad stretch nv     4x30"    4x30"   Prone hamstring curls      3x10 4# standing #6 20 standing #6 20 Standing 6# 30    Bicycle 8' lvl 5 8' lvl 5 8' L5   8' lvl 5 8' lvl 5 8' lvl 5 8' L5 8' lvl 6    Ther Activity             Stair negotiation  2x           Lateral wallks nv            Step up nv 6" 2x10 6" 2x10 in boot          Mini squats nv  20          Seated HR 30x with blk KB     30x with KB 30x with black KB 30x with black KB dumbbell 30x with black KB dumbbell 30x with blk KB   Lateral step ups  10x 4" -hold            Gait Training             With walker or crutches             1 crutch Gait training             Modalities             CP PRN

## 2020-08-02 NOTE — PROGRESS NOTES
Today's date: 8/3/2020  Patient name: Margareth Martínez  : 1985  MRN: [id number]  Referring provider: Melani Hernandez MD  Dx:   Encounter Diagnosis     ICD-10-CM    1  Aftercare following surgery Z48 89    2  Type I or II open fracture of distal end of right tibia with routine healing, unspecified fracture morphology, subsequent encounter S82 301E    3  Type I or II open fracture of distal end of right tibia, unspecified fracture morphology, initial encounter S82 301B                           Start Time: 1138am  Stop Time: 1235pm  Total time in clinic (min): 45 minutes        Assessment  Alfonso Yang is 11 weeks s/p R Tibial ORIF on 20  She has been ambulating in a CAM boot without an assisted device  Pt reports a perceived improvement of 65%  She does admit to intermittently taking boot off without any pain during WB (I e  to go to bathroom at night and walking around the house)  Functional status measure has improved to 56  Pt continues to present with intermittent pain that ranges from 0-6/10 in the anterior ankle, knee, and mid tibial region  ROM of the knee has improved to WNL (+3 of extension to 135 of flexion)  Ankle ROM is improving (10 deg of DF); although still slight limitation into DF  Strength throughout the LE is gradually improving  Pt demonstrates improved ability to ambulate, negotiate stairs, drive, and perform household ADLs  Overall, patient has made steady progress toward goals and would benefit from additional PT at this time  Pt will f/u with Dr Randa Nixon to determine WB status  ADLs  Understanding of Dx/Px/POC: good   Prognosis: good    Goals  Short Term Goals: to be achieved by 4 weeks  1) Patient to be independent with basic HEP  - MET  2) Decrease pain to 2/10 at its worst  - Partially Met  3) Increase right knee and right ankle ROM by 5-10 degrees - MET  4) Increase LE strength by 1/2 MMT grade in all deficient planes   - Partially Met  5) Decrease swelling in RLE by 1-2 cm - partially met    Long Term Goals: to be achieved by discharge  1) FOTO equal to or greater than 55   - MET  2) Ambulation to improve to maximal level of function with least restrictive assisted device - partially met  3) Stair negotiation will improve to reciprocal  - partially met  4) Pt will return to work without limitation - not met    Plan  Continue plan of care but focus on strengthening and ROM for R ankle  Pain  Current pain ratin  At best pain ratin  At worst pain ratin      Objective     Observations     Additional Observation Details  Incisions intact no sign of infection  and Incisions intact with no sign of infection    Active Range of Motion     Right Knee   Flexion: 135 degrees   Extension: 3+ degrees     Right Ankle/Foot   Dorsiflexion (ke): 10 degrees   Plantar flexion: 55 degrees   Inversion: 45 degrees   Eversion: 52 degrees     Strength/Myotome Testing     Right Knee   Quadriceps contraction: fair    Right Ankle/Foot   Dorsiflexion: 4+  Plantar flexion: 4+  Inversion: 4+  Eversion: 4+  Great toe flexion: 5  Great toe extension: 5    Right knee strength:  Flexion: 4/5  Extension: 4/5    Additional Strength Details  No quad lag present with SLR  Strength of knee NT at this time  Tests     Additional Tests Details  Intact neurovascular status with normal capillary refill RLE    Swelling     Right Ankle/Foot   Metatarsal heads: 19 cm  Figure 8: 50 cm  [place]: 23 5 cm    Ambulation     Observational Gait     Additional Observational Gait Details  Pt no longer using AD  Pt is currently ambulating with CAM boot and mild antalgic pattern     General Comments:    Stairs:     Precautions: DDD, Myofascial pain syndrome      Manuals  8/3    7/15 7/17 7/22   PROM of the right knee             PROM of the R ankle 6' 5' 8'     5' 6' 6'   R PFJM             AP TC jt mobs 4' gr III+IV 5' gr III+IV  8' gr III+IV    5' 4' SRB 6' Gr III   STM to posteromedial gastroc/soleus 4' 4'   TC Dorsiflexion MWM  nv            Neuro Re-Ed             Quad sets             Single leg stance  C/ yellow foam 5x15" RLE  /c yellow foam :20x4 RLE /c yellow foam :20x4 RLE         WB rocking with walker             Bridges             TKE with tb  Jovanni 25lbs 3x10  [place] 25# 3x10     [place] 22# 30x Jovanni 22# 30x    SLR flexion             Biodex WB %             LAQ        #7 5 3x10 7 5# 3x10    Total gym squats        3x10 w 10# KB  3x10     Standing BAPS board             Step up Lunge pulses on step w RW nv       3x15 ea, 6" step 3x15 ea, 6" step    Leg press 3x10 90# with boot  3x10 100# in boot   100# 3x10 in boot 100# 3x10 in boot         Ther Ex             Ankle pumps             Ankle alphabet              Gastroc stretch             Hamstring stretch             Heel prop             Soleus stretch             SLR abduction             SL clam shells             Ankle TB 4 ways inversion and eversion with BTB 30x       inversion and eversion with BTB 30x inversion and eversion with BTB 30x    Hamstring curl -seated             Heel slides             Standing hip extension             Standing hip ABD             Standing hip flexion             NuStep             SB gastroc stretch With RW 4x30" With RW 4x30" With RW :30x4 With RW :30x4    With RW 4x30" /c RW :30x4 With RW 4x30"   SB soleus stretch With RW 4x30" With RW 4x30" With RW :30x4 With RW :30x4    With RW 4x30" /c RW :30x4 With RW 4x30"   Prone quad stretch nv   4x30" 130 degrees      4x30"   Prone hamstring curls        standing #6 20 Standing 6# 30    Bicycle 8' lvl 5 8' lvl 5 8' L5 8' L5    8' lvl 5 8' L5 8' lvl 6    Knee extension machine    SL ecc   Control 1x10 22#         Leg curl     2x10 22@         Ther Activity             Stair negotiation  2x  2x         Lateral wallks nv            Step up nv 6" 2x10 6" 2x10 in boot          Mini squats nv  20          Seated HR 30x with blk KB   30x with Blk KB    30x with black KB dumbbell 30x with black KB dumbbell 30x with blk KB   Lateral step ups  10x 4" -hold   Up/down 2x10 with UE support         Gait Training             With walker or crutches             1 crutch Gait training             Stairs    2x         Modalities             CP PRN                                                    1:1 with PT from [id number]

## 2020-08-03 ENCOUNTER — OFFICE VISIT (OUTPATIENT)
Dept: OBGYN CLINIC | Facility: HOSPITAL | Age: 35
End: 2020-08-03

## 2020-08-03 ENCOUNTER — EVALUATION (OUTPATIENT)
Dept: PHYSICAL THERAPY | Facility: CLINIC | Age: 35
End: 2020-08-03
Payer: OTHER MISCELLANEOUS

## 2020-08-03 ENCOUNTER — HOSPITAL ENCOUNTER (OUTPATIENT)
Dept: RADIOLOGY | Facility: HOSPITAL | Age: 35
Discharge: HOME | End: 2020-08-03
Attending: ORTHOPAEDIC SURGERY
Payer: COMMERCIAL

## 2020-08-03 VITALS
WEIGHT: 168 LBS | HEART RATE: 91 BPM | SYSTOLIC BLOOD PRESSURE: 115 MMHG | DIASTOLIC BLOOD PRESSURE: 76 MMHG | HEIGHT: 68 IN | BODY MASS INDEX: 25.46 KG/M2

## 2020-08-03 DIAGNOSIS — Z48.89 AFTERCARE FOLLOWING SURGERY: Primary | ICD-10-CM

## 2020-08-03 DIAGNOSIS — Z48.89 AFTERCARE FOLLOWING SURGERY: ICD-10-CM

## 2020-08-03 DIAGNOSIS — S82.301B TYPE I OR II OPEN FRACTURE OF DISTAL END OF RIGHT TIBIA, UNSPECIFIED FRACTURE MORPHOLOGY, INITIAL ENCOUNTER: ICD-10-CM

## 2020-08-03 DIAGNOSIS — S82.301E TYPE I OR II OPEN FRACTURE OF DISTAL END OF RIGHT TIBIA WITH ROUTINE HEALING, UNSPECIFIED FRACTURE MORPHOLOGY, SUBSEQUENT ENCOUNTER: Primary | ICD-10-CM

## 2020-08-03 PROCEDURE — 97140 MANUAL THERAPY 1/> REGIONS: CPT | Performed by: PHYSICAL THERAPIST

## 2020-08-03 PROCEDURE — 97110 THERAPEUTIC EXERCISES: CPT | Performed by: PHYSICAL THERAPIST

## 2020-08-03 PROCEDURE — 99024 POSTOP FOLLOW-UP VISIT: CPT | Performed by: ORTHOPAEDIC SURGERY

## 2020-08-03 PROCEDURE — 97530 THERAPEUTIC ACTIVITIES: CPT | Performed by: PHYSICAL THERAPIST

## 2020-08-03 PROCEDURE — 3008F BODY MASS INDEX DOCD: CPT | Performed by: ORTHOPAEDIC SURGERY

## 2020-08-03 PROCEDURE — 73590 X-RAY EXAM OF LOWER LEG: CPT

## 2020-08-03 PROCEDURE — 97112 NEUROMUSCULAR REEDUCATION: CPT | Performed by: PHYSICAL THERAPIST

## 2020-08-03 NOTE — PROGRESS NOTES
Assessment:    3 months status post IM nail right tibia for open fracture  This was done on 5/18/2020  Overall doing well with evidence of healing  Plan:    Weightbearing as tolerated right lower extremity, she can now transition out of Cam boot to normal footwear  We will place an updated PT script to help her with this  Continue ankle range of motion exercises to prevent stiffness  Follow-up six weeks for re-evaluation and new x-rays of the right tibia  Problem List Items Addressed This Visit     None      Visit Diagnoses     Aftercare following surgery    -  Primary    Relevant Orders    XR tibia fibula 2 vw right                   Subjective:     Patient ID:  Shonna Moeller is a 37 y o  female    HPI    Almost three months status post IM nail right tibia for open fracture  This was done on 5/18/2020  Today, the patient states she is doing well  She has been compliant with weight-bearing status in boot and has had no real issues with this  She still has numbness about the right knee incision that is unchanged  She gets occasional soreness and stiffness around her right ankle however is not new  Overall she feels like she has progressed with outpatient physical therapy  She denies any fever chills or constitutional symptoms  No recent chest pain or shortness of breath  The following portions of the patient's history were reviewed and updated as appropriate: allergies, current medications, past family history, past medical history, past social history, past surgical history and problem list     Review of Systems     Objective:    Imaging:  X-rays right tibia demonstrate callus formation around the fracture with intact IM nail, in good alignment  Vitals:    08/03/20 1339   BP: 115/76   Pulse: 91           Physical Exam     No acute distress  Gait is without assistance  Right lower extremity:  Incisions are healed  There is no calf tenderness or pitting edema    Palpable posterior tibialis pulse  Full ankle range of motion with plantar dorsiflexion, inversion eversion  5/5 strength with right hip flexion, knee extension, EHL FHL  Sensation is equal intact  Toes are warm and well perfused

## 2020-08-03 NOTE — LETTER
August 3, 2020     Patient: Serene Antunez   YOB: 1985   Date of Visit: 8/3/2020       To Whom it May Concern:    Thuy Easley is under my professional care  She was seen in my office on 8/3/2020  She able to work desk sedentary duty only  No driving yet  No prolonged sitting or standing or repetitive lifting at this point  She will be re-evaluated at her next office visit  If you have any questions or concerns, please don't hesitate to call           Sincerely,          Tatiana Nayak MD        CC: No Recipients

## 2020-08-05 ENCOUNTER — OFFICE VISIT (OUTPATIENT)
Dept: PHYSICAL THERAPY | Facility: CLINIC | Age: 35
End: 2020-08-05
Payer: OTHER MISCELLANEOUS

## 2020-08-05 DIAGNOSIS — Z48.89 AFTERCARE FOLLOWING SURGERY: ICD-10-CM

## 2020-08-05 DIAGNOSIS — S82.301E TYPE I OR II OPEN FRACTURE OF DISTAL END OF RIGHT TIBIA WITH ROUTINE HEALING, UNSPECIFIED FRACTURE MORPHOLOGY, SUBSEQUENT ENCOUNTER: Primary | ICD-10-CM

## 2020-08-05 PROCEDURE — 97110 THERAPEUTIC EXERCISES: CPT | Performed by: PHYSICAL THERAPIST

## 2020-08-05 PROCEDURE — 97530 THERAPEUTIC ACTIVITIES: CPT | Performed by: PHYSICAL THERAPIST

## 2020-08-05 PROCEDURE — 97140 MANUAL THERAPY 1/> REGIONS: CPT | Performed by: PHYSICAL THERAPIST

## 2020-08-05 PROCEDURE — 97116 GAIT TRAINING THERAPY: CPT | Performed by: PHYSICAL THERAPIST

## 2020-08-05 NOTE — PROGRESS NOTES
Daily Note     Today's date: 2020  Patient name: Nancy Quiroz  : 1985  MRN: [id number]  Referring provider: Heather London MD  Dx:   Encounter Diagnosis     ICD-10-CM    1  Type I or II open fracture of distal end of right tibia with routine healing, unspecified fracture morphology, subsequent encounter  S82 301E    2  Aftercare following surgery  Z48 89        Start Time: 08  Stop Time: 930  Total time in clinic (min): 45 minutes    Subjective: Pt f/u with Dr Tangela Mario who now recommends WBAT and transition out of boot  Objective: See treatment diary below      Assessment: Focused on gait training without CAM boot today and tolerated very well  Encouraged appropriate heel contact, weight acceptance, and push off of the involved LE  Pt is please with her progress today  Tolerated treatment well  Patient would benefit from continued PT  Plan: Continue per plan of care        Precautions: DDD, Myofascial pain syndrome      Manuals  8         PROM of the right knee             PROM of the R ankle 6' 5' 8' 3'         R PFJM             AP TC jt mobs 4' gr III+IV 5' gr III+IV  Gr IV 4'         STM to posteromedial gastroc/soleus 4'             TC Dorsiflexion MWM  nv   2x15         Neuro Re-Ed             Single leg stance  C/ yellow foam 5x15" RLE  /c yellow foam :20x4 RLE          TKE with tb  [place] 25lbs 3x10  [place] 25# 3x10          SLR flexion             Biodex WB %    3' no CAM boot- 80%         LAQ             Total gym squats             Standing BAPS board             Step up Lunge pulses on step w RW nv            Leg press 3x10 90# with boot  3x10 100# in boot   100# 3x10 in boot          Ther Ex             Ankle TB 4 ways inversion and eversion with BTB 30x            Standing hip extension             Standing hip ABD             Standing hip flexion             NuStep             SB gastroc stretch With RW 4x30" With RW 4x30" With RW :30x4 4x30" SB soleus stretch With RW 4x30" With RW 4x30" With RW :30x4 4x30"         Prone quad stretch nv            Prone hamstring curls             Bicycle 8' lvl 5 8' lvl 5 8' L5 8' L5         Ther Activity             Stair negotiation  2x           Lateral wallks nv            Step up nv 6" 2x10 6" 2x10 in boot          Mini squats nv  20          Seated HR 30x with blk KB            Lateral step ups  10x 4" -hold            Standing HR    30x         Gait Training             With walker or crutches             1 crutch Gait training             H-A-T    No AD, no CAM boot 15'         Modalities             CP PRN                                                      1:1 with PT from [phone]

## 2020-08-06 ENCOUNTER — CLINICAL SUPPORT (OUTPATIENT)
Dept: OBGYN CLINIC | Facility: CLINIC | Age: 35
End: 2020-08-06
Payer: COMMERCIAL

## 2020-08-06 VITALS
DIASTOLIC BLOOD PRESSURE: 74 MMHG | TEMPERATURE: 97.8 F | HEIGHT: 68 IN | BODY MASS INDEX: 25.54 KG/M2 | SYSTOLIC BLOOD PRESSURE: 116 MMHG

## 2020-08-06 DIAGNOSIS — Z23 NEED FOR HPV VACCINATION: Primary | ICD-10-CM

## 2020-08-06 PROCEDURE — 90471 IMMUNIZATION ADMIN: CPT

## 2020-08-06 PROCEDURE — 90651 9VHPV VACCINE 2/3 DOSE IM: CPT

## 2020-08-06 NOTE — PROGRESS NOTES
Patient came in for her First Gardasil injection today  She tolerated it well with no adverse reactions  Patient sat for 15 mins after injection was given and stated she felt fine to leave  Patient told to schedule her next injection for 2 months from today

## 2020-08-07 ENCOUNTER — OFFICE VISIT (OUTPATIENT)
Dept: PHYSICAL THERAPY | Facility: CLINIC | Age: 35
End: 2020-08-07
Payer: OTHER MISCELLANEOUS

## 2020-08-07 DIAGNOSIS — Z48.89 AFTERCARE FOLLOWING SURGERY: ICD-10-CM

## 2020-08-07 DIAGNOSIS — S82.301E TYPE I OR II OPEN FRACTURE OF DISTAL END OF RIGHT TIBIA WITH ROUTINE HEALING, UNSPECIFIED FRACTURE MORPHOLOGY, SUBSEQUENT ENCOUNTER: Primary | ICD-10-CM

## 2020-08-07 PROCEDURE — 97110 THERAPEUTIC EXERCISES: CPT | Performed by: PHYSICAL THERAPIST

## 2020-08-07 PROCEDURE — 97112 NEUROMUSCULAR REEDUCATION: CPT | Performed by: PHYSICAL THERAPIST

## 2020-08-07 PROCEDURE — 97530 THERAPEUTIC ACTIVITIES: CPT | Performed by: PHYSICAL THERAPIST

## 2020-08-07 PROCEDURE — 97140 MANUAL THERAPY 1/> REGIONS: CPT | Performed by: PHYSICAL THERAPIST

## 2020-08-07 NOTE — PROGRESS NOTES
Daily Note     Today's date: 2020  Patient name: Shonna Moeller  : 1985  MRN: [id number]  Referring provider: Rose Cleary MD  Dx:   Encounter Diagnosis     ICD-10-CM    1  Type I or II open fracture of distal end of right tibia with routine healing, unspecified fracture morphology, subsequent encounter  S82 301E    2  Aftercare following surgery  Z48 89        Start Time: 0900  Stop Time: 0945  Total time in clinic (min): 45 minutes    Subjective: Pt c/o mild stiffness at anterior ankle  Objective: See treatment diary below      Assessment: Pt challenged with SLS on involved LE secondary to weakness in ankle stabilizers and decreased WB tolerance without CAM boot  Ankle ROM measured at 10 degrees of DF after stretching and manuals  Tolerated treatment well  Pt is going to [place] for 1 week  She will have to quarantine and not attend PT for 2 weeks afer arrival home; therefore, we plan to perform a virtual session when she returns from vacation  Patient would benefit from continued PT      Plan: Continue per plan of care        Precautions: DDD, Myofascial pain syndrome      Manuals         PROM of the right knee             PROM of the R ankle 6' 5' 8' 3' 3'        R PFJM             AP TC jt mobs 4' gr III+IV 5' gr III+IV  Gr IV 4' Gr IV 4'        STM to posteromedial gastroc/soleus 4'             TC Dorsiflexion MWM  nv   2x15 2x15        Neuro Re-Ed             Single leg stance  C/ yellow foam 5x15" RLE  /c yellow foam :20x4 RLE  5" hold x20         TKE with tb  [place] 25lbs 3x10  [place] 25# 3x10          SLR flexion             Biodex WB %    3' no CAM boot- 80%         LAQ             Total gym squats             Standing BAPS board             Step up Lunge pulses on step w RW nv            Leg press 3x10 90# with boot  3x10 100# in boot   100# 3x10 in boot  110# no boot         Ther Ex             Ankle TB 4 ways inversion and eversion with BTB 30x Standing hip extension             Standing hip ABD             Standing hip flexion             NuStep             SB gastroc stretch With RW 4x30" With RW 4x30" With RW :30x4 4x30"  4x30"        SB soleus stretch With RW 4x30" With RW 4x30" With RW :30x4 4x30" 4x30"         Prone quad stretch nv            Prone hamstring curls             Bicycle 8' lvl 5 8' lvl 5 8' L5 8' L5 8' L5        Ther Activity             Stair negotiation  2x           Lateral wallks nv            Step up nv 6" 2x10 6" 2x10 in boot  2" 10x        Mini squats nv  20          Seated HR 30x with blk KB            Lateral step ups  10x 4" -hold            Standing HR    30x 30x        Gait Training             With walker or crutches             1 crutch Gait training             H-A-T    No AD, no CAM boot 15'         Modalities             CP PRN                                                      1:1 with PT from [phone]

## 2020-08-11 ENCOUNTER — TELEPHONE (OUTPATIENT)
Dept: OBGYN CLINIC | Facility: HOSPITAL | Age: 35
End: 2020-08-11

## 2020-08-17 ENCOUNTER — APPOINTMENT (OUTPATIENT)
Dept: PHYSICAL THERAPY | Facility: CLINIC | Age: 35
End: 2020-08-17
Payer: OTHER MISCELLANEOUS

## 2020-08-17 ENCOUNTER — TELEPHONE (OUTPATIENT)
Dept: OBGYN CLINIC | Facility: HOSPITAL | Age: 35
End: 2020-08-17

## 2020-08-17 NOTE — TELEPHONE ENCOUNTER
Patient sees Dr Lanny Victor from [facility] the nurse  is calling in wanting to know when this patient is supposed to return as per office instructions to return in about 6 weeks (9/14/2020)

## 2020-08-19 ENCOUNTER — APPOINTMENT (OUTPATIENT)
Dept: PHYSICAL THERAPY | Facility: CLINIC | Age: 35
End: 2020-08-19
Payer: OTHER MISCELLANEOUS

## 2020-08-21 ENCOUNTER — APPOINTMENT (OUTPATIENT)
Dept: PHYSICAL THERAPY | Facility: CLINIC | Age: 35
End: 2020-08-21
Payer: OTHER MISCELLANEOUS

## 2020-08-21 ENCOUNTER — TELEPHONE (OUTPATIENT)
Dept: FAMILY MEDICINE CLINIC | Facility: CLINIC | Age: 35
End: 2020-08-21

## 2020-08-21 DIAGNOSIS — J45.40 MODERATE PERSISTENT ASTHMA WITHOUT COMPLICATION: ICD-10-CM

## 2020-08-21 RX ORDER — BUDESONIDE AND FORMOTEROL FUMARATE DIHYDRATE 160; 4.5 UG/1; UG/1
2 AEROSOL RESPIRATORY (INHALATION) 2 TIMES DAILY
Qty: 1 INHALER | Refills: 1 | Status: SHIPPED | OUTPATIENT
Start: 2020-08-21 | End: 2020-10-01 | Stop reason: SDUPTHER

## 2020-08-21 NOTE — TELEPHONE ENCOUNTER
Call patient, received fax that generic for Symbicort was rejected by her insurance and would likely not be approved if prior authorization was attempted    There preferred agent listed was brand name Symbicort, so will try brand name Symbicort instead

## 2020-08-27 ENCOUNTER — OFFICE VISIT (OUTPATIENT)
Dept: FAMILY MEDICINE CLINIC | Facility: CLINIC | Age: 35
End: 2020-08-27
Payer: COMMERCIAL

## 2020-08-27 VITALS
TEMPERATURE: 97.3 F | WEIGHT: 169 LBS | DIASTOLIC BLOOD PRESSURE: 42 MMHG | BODY MASS INDEX: 25.61 KG/M2 | HEART RATE: 104 BPM | RESPIRATION RATE: 16 BRPM | HEIGHT: 68 IN | SYSTOLIC BLOOD PRESSURE: 90 MMHG | OXYGEN SATURATION: 97 %

## 2020-08-27 DIAGNOSIS — F41.9 ANXIETY AND DEPRESSION: Primary | ICD-10-CM

## 2020-08-27 DIAGNOSIS — F32.A ANXIETY AND DEPRESSION: Primary | ICD-10-CM

## 2020-08-27 PROCEDURE — 1036F TOBACCO NON-USER: CPT | Performed by: FAMILY MEDICINE

## 2020-08-27 PROCEDURE — 99213 OFFICE O/P EST LOW 20 MIN: CPT | Performed by: FAMILY MEDICINE

## 2020-08-27 PROCEDURE — 3008F BODY MASS INDEX DOCD: CPT | Performed by: FAMILY MEDICINE

## 2020-08-27 RX ORDER — VENLAFAXINE HYDROCHLORIDE 150 MG/1
150 CAPSULE, EXTENDED RELEASE ORAL
Qty: 30 CAPSULE | Refills: 1 | Status: SHIPPED | OUTPATIENT
Start: 2020-08-27 | End: 2020-11-16

## 2020-08-27 NOTE — PROGRESS NOTES
FAMILY MEDICINE PROGRESS NOTE  Michael Sanches 37 y o  female   DATE: August 27, 2020     ASSESSMENT and PLAN:  Michael Sanches is a 37 y o  female with:     Problem List Items Addressed This Visit     None      Visit Diagnoses     Anxiety and depression    -  Primary    Relevant Medications    venlafaxine (EFFEXOR-XR) 150 mg 24 hr capsule        Given patient tolerated change in medication without adverse effects, increase venlafaxine to 150 mg daily and titrate up to effective dose  Advised patient it will take approximately 4-6 weeks to see and affect  Advised patient to get previously ordered labs completed to further evaluate for any underlying causes of her fatigue  Will follow-up after lab results or sooner p r n  SUBJECTIVE:  Michael Sanches is a 37 y o  female who presents today with a chief complaint of Follow-up  Patient presents for one-month follow-up  Labs were ordered for evaluation of fatigue, patient has not yet completed those labs because she just returned from a vacation to [place] in completed her mandatory quarantine  Patient states she will try to get blood work done this week  Also she has been struggling with anxiety and depression and her Lexapro 10 mg was switched to Effexor 75 mg daily  Patient states that she did not have any side effects and tolerated the medication okay, thinks overall that her depression is about the same  She does not think it has gotten worse or has gotten better with the change in medications  Review of Systems   Constitutional: Positive for fatigue  Psychiatric/Behavioral: Positive for dysphoric mood  The patient is nervous/anxious  I have reviewed the patient's Past Medical History      Current Outpatient Medications:     acetaminophen (TYLENOL) 325 mg tablet, Take 3 tablets (975 mg total) by mouth every 8 (eight) hours, Disp: 30 tablet, Rfl: 0    albuterol (PROVENTIL HFA,VENTOLIN HFA) 90 mcg/act inhaler, Inhale 2 puffs every 6 (six) hours as needed for wheezing, Disp: , Rfl:     ALPRAZolam (XANAX) 0 5 mg tablet, Take 1 tablet (0 5 mg total) by mouth daily at bedtime as needed for anxiety, Disp: 20 tablet, Rfl: 0    fluticasone (FLONASE) 50 mcg/act nasal spray, 1 spray into each nostril daily as needed for allergies, Disp: 1 Bottle, Rfl: 5    oxyCODONE (ROXICODONE) 5 mg immediate release tablet, 5 mg to 10 mg PO every 4 hours as needed for moderate to severe pain , Disp: 30 tablet, Rfl: 0    senna-docusate sodium (SENOKOT S) 8 6-50 mg per tablet, Take 2 tablets by mouth daily, Disp: 30 tablet, Rfl: 0    Symbicort 160-4 5 MCG/ACT inhaler, Inhale 2 puffs 2 (two) times a day Rinse mouth after use , Disp: 1 Inhaler, Rfl: 1    venlafaxine (EFFEXOR-XR) 75 mg 24 hr capsule, Take 1 capsule (75 mg total) by mouth daily with breakfast, Disp: 30 capsule, Rfl: 5    OBJECTIVE:  BP (!) 90/42   Pulse 104   Temp (!) 97 3 °F (36 3 °C)   Resp 16   Ht 5' 8" (1 727 m)   Wt 76 7 kg (169 lb)   LMP 05/01/2019   SpO2 97%   BMI 25 70 kg/m²    Physical Exam  Vitals signs reviewed  Constitutional:       Appearance: Normal appearance  Neurological:      Mental Status: She is alert  Psychiatric:         Attention and Perception: Attention normal          Mood and Affect: Mood is depressed  Affect is blunt  Speech: Speech normal          Behavior: Behavior normal          Thought Content: Thought content normal          Cognition and Memory: Cognition normal               Mercedes Adorno MD    Note: Portions of the record have been created with voice recognition software  Occasional wrong word or "sound a like" substitutions may have occurred due to the inherent limitations of voice recognition software  Read the chart carefully and recognize, using context, where substitutions have occurred

## 2020-08-28 ENCOUNTER — OFFICE VISIT (OUTPATIENT)
Dept: PHYSICAL THERAPY | Facility: CLINIC | Age: 35
End: 2020-08-28
Payer: OTHER MISCELLANEOUS

## 2020-08-28 ENCOUNTER — APPOINTMENT (OUTPATIENT)
Dept: LAB | Facility: CLINIC | Age: 35
End: 2020-08-28
Payer: COMMERCIAL

## 2020-08-28 DIAGNOSIS — Z48.89 AFTERCARE FOLLOWING SURGERY: ICD-10-CM

## 2020-08-28 DIAGNOSIS — D50.8 OTHER IRON DEFICIENCY ANEMIA: ICD-10-CM

## 2020-08-28 DIAGNOSIS — S82.301E TYPE I OR II OPEN FRACTURE OF DISTAL END OF RIGHT TIBIA WITH ROUTINE HEALING, UNSPECIFIED FRACTURE MORPHOLOGY, SUBSEQUENT ENCOUNTER: Primary | ICD-10-CM

## 2020-08-28 DIAGNOSIS — E04.0 DIFFUSE NONTOXIC GOITER: ICD-10-CM

## 2020-08-28 DIAGNOSIS — Z11.3 SCREENING FOR STD (SEXUALLY TRANSMITTED DISEASE): ICD-10-CM

## 2020-08-28 DIAGNOSIS — E78.49 OTHER HYPERLIPIDEMIA: ICD-10-CM

## 2020-08-28 LAB
ALBUMIN SERPL BCP-MCNC: 4.1 G/DL (ref 3.5–5)
ALP SERPL-CCNC: 71 U/L (ref 46–116)
ALT SERPL W P-5'-P-CCNC: 18 U/L (ref 12–78)
ANION GAP SERPL CALCULATED.3IONS-SCNC: 6 MMOL/L (ref 4–13)
AST SERPL W P-5'-P-CCNC: 14 U/L (ref 5–45)
BILIRUB SERPL-MCNC: 0.28 MG/DL (ref 0.2–1)
BUN SERPL-MCNC: 9 MG/DL (ref 5–25)
CALCIUM SERPL-MCNC: 9.4 MG/DL (ref 8.3–10.1)
CHLORIDE SERPL-SCNC: 108 MMOL/L (ref 100–108)
CHOLEST SERPL-MCNC: 226 MG/DL (ref 50–200)
CO2 SERPL-SCNC: 23 MMOL/L (ref 21–32)
CREAT SERPL-MCNC: 0.7 MG/DL (ref 0.6–1.3)
ERYTHROCYTE [DISTWIDTH] IN BLOOD BY AUTOMATED COUNT: 13.1 % (ref 11.6–15.1)
GFR SERPL CREATININE-BSD FRML MDRD: 114 ML/MIN/1.73SQ M
GLUCOSE P FAST SERPL-MCNC: 82 MG/DL (ref 65–99)
HBV SURFACE AG SER QL: NORMAL
HCT VFR BLD AUTO: 42.9 % (ref 34.8–46.1)
HCV AB SER QL: NORMAL
HDLC SERPL-MCNC: 79 MG/DL
HGB BLD-MCNC: 13.7 G/DL (ref 11.5–15.4)
LDLC SERPL CALC-MCNC: 125 MG/DL (ref 0–100)
MCH RBC QN AUTO: 29.7 PG (ref 26.8–34.3)
MCHC RBC AUTO-ENTMCNC: 31.9 G/DL (ref 31.4–37.4)
MCV RBC AUTO: 93 FL (ref 82–98)
PLATELET # BLD AUTO: 320 THOUSANDS/UL (ref 149–390)
PMV BLD AUTO: 10.1 FL (ref 8.9–12.7)
POTASSIUM SERPL-SCNC: 4.3 MMOL/L (ref 3.5–5.3)
PROT SERPL-MCNC: 7.8 G/DL (ref 6.4–8.2)
RBC # BLD AUTO: 4.61 MILLION/UL (ref 3.81–5.12)
SODIUM SERPL-SCNC: 137 MMOL/L (ref 136–145)
TRIGL SERPL-MCNC: 111 MG/DL
TSH SERPL DL<=0.05 MIU/L-ACNC: 0.98 UIU/ML (ref 0.36–3.74)
WBC # BLD AUTO: 10.55 THOUSAND/UL (ref 4.31–10.16)

## 2020-08-28 PROCEDURE — 97530 THERAPEUTIC ACTIVITIES: CPT | Performed by: PHYSICAL THERAPIST

## 2020-08-28 PROCEDURE — 86592 SYPHILIS TEST NON-TREP QUAL: CPT

## 2020-08-28 PROCEDURE — 86803 HEPATITIS C AB TEST: CPT

## 2020-08-28 PROCEDURE — 97110 THERAPEUTIC EXERCISES: CPT | Performed by: PHYSICAL THERAPIST

## 2020-08-28 PROCEDURE — 86376 MICROSOMAL ANTIBODY EACH: CPT

## 2020-08-28 PROCEDURE — 87340 HEPATITIS B SURFACE AG IA: CPT

## 2020-08-28 PROCEDURE — 36415 COLL VENOUS BLD VENIPUNCTURE: CPT

## 2020-08-28 PROCEDURE — 80053 COMPREHEN METABOLIC PANEL: CPT

## 2020-08-28 PROCEDURE — 97112 NEUROMUSCULAR REEDUCATION: CPT | Performed by: PHYSICAL THERAPIST

## 2020-08-28 PROCEDURE — 87389 HIV-1 AG W/HIV-1&-2 AB AG IA: CPT

## 2020-08-28 PROCEDURE — 85027 COMPLETE CBC AUTOMATED: CPT

## 2020-08-28 PROCEDURE — 86800 THYROGLOBULIN ANTIBODY: CPT

## 2020-08-28 PROCEDURE — 84443 ASSAY THYROID STIM HORMONE: CPT

## 2020-08-28 PROCEDURE — 80061 LIPID PANEL: CPT

## 2020-08-28 PROCEDURE — 97140 MANUAL THERAPY 1/> REGIONS: CPT | Performed by: PHYSICAL THERAPIST

## 2020-08-28 NOTE — PROGRESS NOTES
Daily Note     Today's date: 2020  Patient name: Chris Jose  : 1985  MRN: [id number]  Referring provider: Mar December, MD  Dx:   Encounter Diagnosis     ICD-10-CM    1  Type I or II open fracture of distal end of right tibia with routine healing, unspecified fracture morphology, subsequent encounter  S82 301E    2  Aftercare following surgery  Z48 89        Start Time: 1025  Stop Time: 1115  Total time in clinic (min): 50 minutes    Subjective: Pt returns for vacation  She complains of soreness in ankle and flexibility restrictions  She reports difficulty with trying to mow her lawn  She states her leg was "killing her" at the half way point  Objective: See treatment diary below  Ankle DF: 12  Ankle PF: 52    Assessment: Ankle ROM remains unchanged from august re-evaluation  Pt challenged with WB exercises at involved ankle  She is using TENS unit at home to reduce the discomfort  Today I discussed pain neuroscience principle of sensitization and healing pattern  Pt is aware that pain is not a true reflection of the state of the health of the tissue  Pt does have limited tolerance of weight bearing today secondary to pain  Tolerated treatment fair  Patient would benefit from continued PT  Plan: Continue per plan of care        Precautions: DDD, Myofascial pain syndrome      Manuals        PROM of the right knee             PROM of the R ankle 6' 5' 8' 3' 3' 3'       R PFJM             AP TC jt mobs 4' gr III+IV 5' gr III+IV  Gr IV 4' Gr IV 4' Gr IV 4'       STM to posteromedial gastroc/soleus 4'             TC Dorsiflexion MWM  nv   2x15 2x15 2x15       Neuro Re-Ed             Single leg stance  C/ yellow foam 5x15" RLE  /c yellow foam :20x4 RLE  5" hold x20  3" x 10        TKE with tb  [place] 25lbs 3x10  [place] 25# 3x10          SLR flexion             Biodex WB %    3' no CAM boot- 80%         LAQ             Total gym squats             Standing BAPS board             Step up Lunge pulses on step w RW nv            Leg press 3x10 90# with boot  3x10 100# in boot   100# 3x10 in boot  110# no boot  110# 3x10        Ther Ex             Ankle TB 4 ways inversion and eversion with BTB 30x            Standing hip extension             Standing hip ABD             Standing hip flexion             NuStep             SB gastroc stretch With RW 4x30" With RW 4x30" With RW :30x4 4x30"  4x30" 4x30"       SB soleus stretch With RW 4x30" With RW 4x30" With RW :30x4 4x30" 4x30"  4x30"       Prone quad stretch nv            Prone hamstring curls             Bicycle 8' lvl 5 8' lvl 5 8' L5 8' L5 8' L5 8' L5       Ther Activity             Stair negotiation  2x           Lateral wallks nv            Step up nv 6" 2x10 6" 2x10 in boot  2" 10x 4" 10x        Mini squats nv  20          Seated HR 30x with blk KB            Lateral step ups  10x 4" -hold            Standing HR    30x 30x 30x       Gait Training             With walker or crutches             1 crutch Gait training             H-A-T    No AD, no CAM boot 15'         Modalities             CP PRN                                                      1:1 with PT from [id number]

## 2020-08-29 LAB
HIV 1+2 AB+HIV1 P24 AG SERPL QL IA: NORMAL
THYROGLOB AB SERPL-ACNC: <1 IU/ML (ref 0–0.9)
THYROPEROXIDASE AB SERPL-ACNC: <9 IU/ML (ref 0–34)

## 2020-08-31 ENCOUNTER — OFFICE VISIT (OUTPATIENT)
Dept: PHYSICAL THERAPY | Facility: CLINIC | Age: 35
End: 2020-08-31
Payer: OTHER MISCELLANEOUS

## 2020-08-31 DIAGNOSIS — S82.301E TYPE I OR II OPEN FRACTURE OF DISTAL END OF RIGHT TIBIA WITH ROUTINE HEALING, UNSPECIFIED FRACTURE MORPHOLOGY, SUBSEQUENT ENCOUNTER: Primary | ICD-10-CM

## 2020-08-31 DIAGNOSIS — Z48.89 AFTERCARE FOLLOWING SURGERY: ICD-10-CM

## 2020-08-31 LAB — RPR SER QL: NORMAL

## 2020-08-31 PROCEDURE — 97110 THERAPEUTIC EXERCISES: CPT | Performed by: PHYSICAL THERAPIST

## 2020-08-31 PROCEDURE — 97112 NEUROMUSCULAR REEDUCATION: CPT | Performed by: PHYSICAL THERAPIST

## 2020-08-31 PROCEDURE — 97140 MANUAL THERAPY 1/> REGIONS: CPT | Performed by: PHYSICAL THERAPIST

## 2020-08-31 NOTE — PROGRESS NOTES
Daily Note     Today's date: 2020  Patient name: Beltran Beltran  : 1985  MRN: [id number]  Referring provider: Angus Pina MD  Dx:   Encounter Diagnosis     ICD-10-CM    1  Type I or II open fracture of distal end of right tibia with routine healing, unspecified fracture morphology, subsequent encounter  S82 301E    2  Aftercare following surgery  Z48 89        Start Time: 261  Stop Time: 226  Total time in clinic (min): 50 minutes    Subjective: Pt reports feeling "stiff today " Pt is concerned about her workers comp insurance  She is also concerned that she can't walk 6 hours per day at work, a job requirement to work at [employer] according to patient  Objective: See treatment diary below      Assessment: Tolerated treatment fair  Mild swelling present at distal ankle which is expected  Updated program to help accommodated patients' areas of discomfort and impairments  Patient challenged with proprioception exercises  Pt is still having difficulty with WB secondary to discomfort and ankle weakness  Pt encouraged to work on this at home as we cannot increase program to SLS activities until pt demonstrates proficiency with this activity  Patient would benefit from continued PT  Plan: Continue per plan of care        Precautions: DDD, Myofascial pain syndrome      Manuals       PROM of the right knee             PROM of the R ankle 6' 5' 8' 3' 3' 3' 3'      R PFJM             AP TC jt mobs 4' gr III+IV 5' gr III+IV  Gr IV 4' Gr IV 4' Gr IV 4' Gr IV 4'      STM to posteromedial gastroc/soleus 4'             TC Dorsiflexion MWM  nv   2x15 2x15 2x15 30x      Neuro Re-Ed             Single leg stance  C/ yellow foam 5x15" RLE  /c yellow foam :20x4 RLE  5" hold x20  3" x 10  5" x 20       TKE with tb  [place] 25lbs 3x10  J[place] 25# 3x10          SLR flexion             Biodex WB %    3' no CAM boot- 80%         LAQ             Total gym squats Standing BAPS board             Step up Lunge pulses on step w RW nv            Leg press 3x10 90# with boot  3x10 100# in boot   100# 3x10 in boot  110# no boot  110# 3x10        Wobble board       S/s, f/b 3' each      Squat on BOSU       3x10      Ther Ex             Ankle TB 4 ways inversion and eversion with BTB 30x            Standing hip extension             Standing hip ABD             Standing hip flexion             NuStep             SB gastroc stretch With RW 4x30" With RW 4x30" With RW :30x4 4x30"  4x30" 4x30" 4x30"      SB soleus stretch With RW 4x30" With RW 4x30" With RW :30x4 4x30" 4x30"  4x30" 4x30"      Prone quad stretch nv            Prone hamstring curls             Bicycle 8' lvl 5 8' lvl 5 8' L5 8' L5 8' L5 8' L5 8' L 5      Self knee/ankle PF mobilization in WB kneeling position       10x5"RLE       Anterior tib stretch        10x10" RLE      Ther Activity             Stair negotiation  2x           Lateral wallks nv            Step up nv 6" 2x10 6" 2x10 in boot  2" 10x 4" 10x        Mini squats nv  20          Seated HR 30x with blk KB            Lateral step ups  10x 4" -hold            Standing HR    30x 30x 30x       Gait Training             With walker or crutches             1 crutch Gait training             H-A-T    No AD, no CAM boot 15'         Modalities             CP PRN                                                      1:1 with PT from 1145-1235pm

## 2020-09-03 ENCOUNTER — OFFICE VISIT (OUTPATIENT)
Dept: PHYSICAL THERAPY | Facility: CLINIC | Age: 35
End: 2020-09-03
Payer: OTHER MISCELLANEOUS

## 2020-09-03 DIAGNOSIS — S82.301E TYPE I OR II OPEN FRACTURE OF DISTAL END OF RIGHT TIBIA WITH ROUTINE HEALING, UNSPECIFIED FRACTURE MORPHOLOGY, SUBSEQUENT ENCOUNTER: Primary | ICD-10-CM

## 2020-09-03 PROCEDURE — 97112 NEUROMUSCULAR REEDUCATION: CPT | Performed by: PHYSICAL THERAPIST

## 2020-09-03 PROCEDURE — 97140 MANUAL THERAPY 1/> REGIONS: CPT | Performed by: PHYSICAL THERAPIST

## 2020-09-03 PROCEDURE — 97110 THERAPEUTIC EXERCISES: CPT | Performed by: PHYSICAL THERAPIST

## 2020-09-03 NOTE — PROGRESS NOTES
Daily Note     Today's date: 9/3/2020  Patient name: Ayush Alfonso  : 1985  MRN: [id number]  Referring provider: Mable Hector MD  Dx:   Encounter Diagnosis     ICD-10-CM    1  Type I or II open fracture of distal end of right tibia with routine healing, unspecified fracture morphology, subsequent encounter  S82 465E                   Subjective: Patient states she is still sore from trying to walk on her foot more  She always has the hardest time when she first gets up and begins to walk on it  She started to drive over the weekend and it feels good  Objective: See treatment diary below      Assessment: Tolerated treatment fair  Minimal swelling present today  Patient is challenged by kneeling PF stretch due to discomfort at knee  She has good mobility and tolerance to PROM and mobes to ankle  She has mild difficulty with anterior translation of tibia over TC joint while ambulating, and slight antalgic gait  Patient would benefit from continued PT  Plan: Continue per plan of care        Precautions: DDD, Myofascial pain syndrome      Manuals 7/24 7/28 7/31 8/5 8/7 8/28 8/31 9/3      PROM of the right knee             PROM of the R ankle 6' 5' 8' 3' 3' 3' 3' 3'     R PFJM             AP TC jt mobs 4' gr III+IV 5' gr III+IV  Gr IV 4' Gr IV 4' Gr IV 4' Gr IV 4' Gr 4 4'     STM to posteromedial gastroc/soleus 4'             TC Dorsiflexion MWM  nv   2x15 2x15 2x15 30x 30x     Neuro Re-Ed             Single leg stance  C/ yellow foam 5x15" RLE  /c yellow foam :20x4 RLE  5" hold x20  3" x 10  5" x 20  5" x20      TKE with tb  [place] 25lbs 3x10  Jovanni 25# 3x10          SLR flexion             Biodex WB %    3' no CAM boot- 80%         LAQ             Total gym squats             Standing BAPS board             Step up Lunge pulses on step w RW nv            Leg press 3x10 90# with boot  3x10 100# in boot   100# 3x10 in boot  110# no boot  110# 3x10        Wobble board       S/s, f/b 3' each 3' sides, f/b     Squat on BOSU       3x10 3x10      Ther Ex             Ankle TB 4 ways inversion and eversion with BTB 30x            Standing hip extension             Standing hip ABD             Standing hip flexion             NuStep             SB gastroc stretch With RW 4x30" With RW 4x30" With RW :30x4 4x30"  4x30" 4x30" 4x30" 4x30"     SB soleus stretch With RW 4x30" With RW 4x30" With RW :30x4 4x30" 4x30"  4x30" 4x30" 4x30"     Prone quad stretch nv            Prone hamstring curls             Bicycle 8' lvl 5 8' lvl 5 8' L5 8' L5 8' L5 8' L5 8' L 5 8' L5      Self knee/ankle PF mobilization in WB kneeling position       10x5"RLE       Anterior tib stretch        10x10" RLE 10x10"      Ther Activity             Stair negotiation  2x           Lateral wallks nv            Step up nv 6" 2x10 6" 2x10 in boot  2" 10x 4" 10x        Mini squats nv  20          Seated HR 30x with blk KB            Lateral step ups  10x 4" -hold            Standing HR    30x 30x 30x       Gait Training             With walker or crutches             1 crutch Gait training             H-A-T    No AD, no CAM boot 15'         Modalities             CP PRN

## 2020-09-08 ENCOUNTER — TELEPHONE (OUTPATIENT)
Dept: FAMILY MEDICINE CLINIC | Facility: CLINIC | Age: 35
End: 2020-09-08

## 2020-09-08 ENCOUNTER — OFFICE VISIT (OUTPATIENT)
Dept: PHYSICAL THERAPY | Facility: CLINIC | Age: 35
End: 2020-09-08
Payer: OTHER MISCELLANEOUS

## 2020-09-08 DIAGNOSIS — Z48.89 AFTERCARE FOLLOWING SURGERY: ICD-10-CM

## 2020-09-08 DIAGNOSIS — S82.301E TYPE I OR II OPEN FRACTURE OF DISTAL END OF RIGHT TIBIA WITH ROUTINE HEALING, UNSPECIFIED FRACTURE MORPHOLOGY, SUBSEQUENT ENCOUNTER: Primary | ICD-10-CM

## 2020-09-08 PROCEDURE — 97112 NEUROMUSCULAR REEDUCATION: CPT | Performed by: PHYSICAL THERAPIST

## 2020-09-08 PROCEDURE — 97140 MANUAL THERAPY 1/> REGIONS: CPT | Performed by: PHYSICAL THERAPIST

## 2020-09-08 PROCEDURE — 97110 THERAPEUTIC EXERCISES: CPT | Performed by: PHYSICAL THERAPIST

## 2020-09-08 NOTE — PROGRESS NOTES
Daily Note     Today's date: 2020  Patient name: Nancy Quiroz  : 1985  MRN: [id number]  Referring provider: Heather London MD  Dx:   Encounter Diagnosis     ICD-10-CM    1  Type I or II open fracture of distal end of right tibia with routine healing, unspecified fracture morphology, subsequent encounter  S82 301E    2  Aftercare following surgery  Z48 89                   Subjective: Patient stated moderate soreness prior to treatment session of insidious onset  Objective: See treatment diary below      Assessment: Patient performed exercises without c/o pain; positive response to manual intervention  Plan: Continue per plan of care        Precautions: DDD, Myofascial pain syndrome      Manuals 7/24 7/28 7/31 8/5 8/7 8/28 8/31 9/3  9/8    PROM of the right knee             PROM of the R ankle 6' 5' 8' 3' 3' 3' 3' 3' 3'    R PFJM             AP TC jt mobs 4' gr III+IV 5' gr III+IV  Gr IV 4' Gr IV 4' Gr IV 4' Gr IV 4' Gr 4 4' Gr IV 4'    STM to posteromedial gastroc/soleus 4'             TC Dorsiflexion MWM  nv   2x15 2x15 2x15 30x 30x 30x    Neuro Re-Ed             Single leg stance  C/ yellow foam 5x15" RLE  /c yellow foam :20x4 RLE  5" hold x20  3" x 10  5" x 20  5" x20  10"x10    TKE with tb  [place] 25lbs 3x10  [place] 25# 3x10          SLR flexion             Biodex WB %    3' no CAM boot- 80%         LAQ             Total gym squats             Standing BAPS board             Step up Lunge pulses on step w RW nv            Leg press 3x10 90# with boot  3x10 100# in boot   100# 3x10 in boot  110# no boot  110# 3x10        Wobble board       S/s, f/b 3' each 3' sides, f/b 3' sides, f/b    Squat on BOSU       3x10 3x10  3x10    Ther Ex             Ankle TB 4 ways inversion and eversion with BTB 30x            Standing hip extension             Standing hip ABD             Standing hip flexion             NuStep             SB gastroc stretch With RW 4x30" With RW 4x30" With RW :30x4 4x30"  4x30" 4x30" 4x30" 4x30" 4x30"    SB soleus stretch With RW 4x30" With RW 4x30" With RW :30x4 4x30" 4x30"  4x30" 4x30" 4x30" 4x30"    Prone quad stretch nv            Prone hamstring curls             Bicycle 8' lvl 5 8' lvl 5 8' L5 8' L5 8' L5 8' L5 8' L 5 8' L5  8' L5    Self knee/ankle PF mobilization in WB kneeling position       10x5"RLE       Anterior tib stretch        10x10" RLE 10x10"  10x10"    Ther Activity             Stair negotiation  2x           Lateral wallks nv            Step up nv 6" 2x10 6" 2x10 in boot  2" 10x 4" 10x        Mini squats nv  20          Seated HR 30x with blk KB            Lateral step ups  10x 4" -hold            Standing HR    30x 30x 30x       Gait Training             With walker or crutches             1 crutch Gait training             H-A-T    No AD, no CAM boot 15'         Modalities             CP PRN

## 2020-09-08 NOTE — TELEPHONE ENCOUNTER
Patient called asking if there is any updates on her lab work results   Patient would like a return call [phone]

## 2020-09-10 ENCOUNTER — APPOINTMENT (OUTPATIENT)
Dept: PHYSICAL THERAPY | Facility: CLINIC | Age: 35
End: 2020-09-10
Payer: OTHER MISCELLANEOUS

## 2020-09-14 ENCOUNTER — OFFICE VISIT (OUTPATIENT)
Dept: OBGYN CLINIC | Facility: HOSPITAL | Age: 35
End: 2020-09-14
Payer: OTHER MISCELLANEOUS

## 2020-09-14 ENCOUNTER — HOSPITAL ENCOUNTER (OUTPATIENT)
Dept: RADIOLOGY | Facility: HOSPITAL | Age: 35
Discharge: HOME | End: 2020-09-14
Attending: ORTHOPAEDIC SURGERY
Payer: COMMERCIAL

## 2020-09-14 VITALS — HEART RATE: 102 BPM | SYSTOLIC BLOOD PRESSURE: 95 MMHG | DIASTOLIC BLOOD PRESSURE: 58 MMHG

## 2020-09-14 DIAGNOSIS — Z48.89 AFTERCARE FOLLOWING SURGERY: Primary | ICD-10-CM

## 2020-09-14 DIAGNOSIS — Z48.89 AFTERCARE FOLLOWING SURGERY: ICD-10-CM

## 2020-09-14 PROCEDURE — 73590 X-RAY EXAM OF LOWER LEG: CPT

## 2020-09-14 PROCEDURE — 99213 OFFICE O/P EST LOW 20 MIN: CPT | Performed by: ORTHOPAEDIC SURGERY

## 2020-09-14 NOTE — LETTER
September 14, 2020     Patient: Chris Jose   YOB: 1985   Date of Visit: 9/14/2020       To Whom it May Concern:    Terrance Love is under my professional care  She was seen in my office on 9/14/2020  The patient should continue same restrictions  If you have any questions or concerns, please don't hesitate to call           Sincerely,          Karon Baca MD        CC: No Recipients

## 2020-09-14 NOTE — PROGRESS NOTES
Assessment/Plan:    No problem-specific Assessment & Plan notes found for this encounter  4 months s/p right tibia IM nail 5/18/2020  · Patient to continue physical therapy as needed  · Patient to remain on same restrictions  · Follow up in 6 weeks  Problem List Items Addressed This Visit     None      Visit Diagnoses     Aftercare following surgery    -  Primary    Relevant Orders    XR tibia fibula 2 vw right            Subjective:      Patient ID: Zelalem Smart is a 37 y o  female  HPI   The patient presents 4 months s/p right tibia IM nail, 5/18/2020  She is doing well  Today she complains of generalized right ankle soreness increased with walking activities  She does wear the CAM boot on occasion  She does participate in physical therapy along with her HEP  She will use tylenol with limited benefit  The following portions of the patient's history were reviewed and updated as appropriate: allergies, current medications, past family history, past medical history, past social history, past surgical history and problem list     Review of Systems   Constitutional: Negative for chills, fever and unexpected weight change  HENT: Negative for hearing loss, nosebleeds and sore throat  Eyes: Negative for pain, redness and visual disturbance  Respiratory: Negative for cough, shortness of breath and wheezing  Cardiovascular: Negative for chest pain, palpitations and leg swelling  Gastrointestinal: Negative for abdominal pain, nausea and vomiting  Genitourinary: Negative for dyspareunia, dysuria and frequency  Skin: Negative for rash and wound  Neurological: Negative for dizziness, numbness and headaches  Psychiatric/Behavioral: Negative for decreased concentration and suicidal ideas  The patient is not nervous/anxious  Objective:      BP 95/58   Pulse 102   LMP 05/01/2019          Physical Exam  Constitutional:       Appearance: She is well-developed     HENT: Head: Normocephalic  Eyes:      Conjunctiva/sclera: Conjunctivae normal    Neck:      Musculoskeletal: Normal range of motion  Cardiovascular:      Rate and Rhythm: Normal rate  Pulmonary:      Effort: Pulmonary effort is normal    Skin:     General: Skin is warm and dry  Neurological:      Mental Status: She is alert and oriented to person, place, and time  Right lower extremity:  Well healed incisions  No erythema or ecchymosis  Good DF, PF eversion and inversion  CAM boot removed for exam    Imaging:  Right tibia/fibula:  Healed fracture site with no evidence of hardware failure          Scribe Attestation    I,:   Leti Keita am acting as a scribe while in the presence of the attending physician :        I,:   Judit Corrales MD personally performed the services described in this documentation    as scribed in my presence :

## 2020-09-15 ENCOUNTER — APPOINTMENT (OUTPATIENT)
Dept: PHYSICAL THERAPY | Facility: CLINIC | Age: 35
End: 2020-09-15
Payer: OTHER MISCELLANEOUS

## 2020-09-17 ENCOUNTER — EVALUATION (OUTPATIENT)
Dept: PHYSICAL THERAPY | Facility: CLINIC | Age: 35
End: 2020-09-17
Payer: OTHER MISCELLANEOUS

## 2020-09-17 DIAGNOSIS — S82.301E TYPE I OR II OPEN FRACTURE OF DISTAL END OF RIGHT TIBIA WITH ROUTINE HEALING, UNSPECIFIED FRACTURE MORPHOLOGY, SUBSEQUENT ENCOUNTER: Primary | ICD-10-CM

## 2020-09-17 DIAGNOSIS — Z48.89 AFTERCARE FOLLOWING SURGERY: ICD-10-CM

## 2020-09-17 PROCEDURE — 97112 NEUROMUSCULAR REEDUCATION: CPT | Performed by: PHYSICAL THERAPIST

## 2020-09-17 PROCEDURE — 97110 THERAPEUTIC EXERCISES: CPT | Performed by: PHYSICAL THERAPIST

## 2020-09-17 PROCEDURE — 97140 MANUAL THERAPY 1/> REGIONS: CPT | Performed by: PHYSICAL THERAPIST

## 2020-09-17 NOTE — PROGRESS NOTES
[id number]       Today's date: 8/3/2020    Patient name: Chris Jose  : 1985  MRN: [id number]  Referring provider: Mar December, MD  Dx:   Encounter Diagnosis     ICD-10-CM    1  Aftercare following surgery Z48 89    2  Type I or II open fracture of distal end of right tibia with routine healing, unspecified fracture morphology, subsequent encounter S82 301E    3  Type I or II open fracture of distal end of right tibia, unspecified fracture morphology, initial encounter S82 301B                           Start Time: 1138am  Stop Time: 1235pm  Total time in clinic (min): 45 minutes        Assessment  Ayden Villeda is 5 months s/p R Tibial ORIF on 20  Pt followed up with Dr Kelsey Blanchard early this week  XR demonstrates good healing with no evidence of hardware failure according to last surgeon note  Pt is to remain as same restrictions as previously prescribed  Pt continues to complain of pain at involved distal LE during prolonged weight bearing and ambulation  She still has an antalgic gait pattern with decreased push off and short stance phase  ROM of the ankle has improved with both dorsiflexion and plantar flexion  Pt has 15 degrees of active DF with knee extended  Pt continues to demonstrate difficulty with stair negotiation, single leg stance, and single leg heel raise test  She is to remain off of work according to patient  Right knee weakness continues along with ankle inversion and eversion  Overall patient has made steady progress with PT intervention and will benefit from continued PT to improve above limitations and return to premorbid status  ADLs  Understanding of Dx/Px/POC: good   Prognosis: good    Goals  Short Term Goals: to be achieved by 4 weeks  1) Patient to be independent with basic HEP  - MET  2) Decrease pain to 2/10 at its worst  - Partially Met  3) Increase right knee and right ankle ROM by 5-10 degrees - MET  4) Increase LE strength by 1/2 MMT grade in all deficient planes  - Partially Met  5) Decrease swelling in RLE by 1-2 cm - partially met    Long Term Goals: to be achieved by discharge  1) FOTO equal to or greater than 55   - MET  2) Ambulation to improve to maximal level of function with least restrictive assisted device - partially met  3) Stair negotiation will improve to reciprocal  - partially met  4) Pt will return to work without limitation - not met    Plan  Continue plan of care but focus on strengthening and ROM for R ankle  Pain  Current pain ratin  At best pain ratin  At worst pain ratin      Objective     Observations     Additional Observation Details  Incisions intact no sign of infection  and Incisions intact with no sign of infection    Active Range of Motion     Right Knee   Flexion: 140 degrees supine/ 130 Prone knee bending  Extension: 3+ degrees     Right Ankle/Foot   Dorsiflexion (ke): 15 degrees   Plantar flexion: 64 degrees   Inversion: 54 degrees   Eversion: 30 degrees     Strength/Myotome Testing     Right Knee   Quadriceps contraction: fair    Right Ankle/Foot   Dorsiflexion: 5  Plantar flexion: 5  Inversion: 4+  Eversion: 4+  Great toe flexion: 5  Great toe extension: 5    Right knee strength:  Flexion: 4/5  Extension: 4/5    Additional Strength Details  No quad lag present with SLR      Tests     Additional Tests Details  Intact neurovascular status with normal capillary refill RLE    Swelling     Right Ankle/Foot   Metatarsal heads: 19 cm  Figure 8: 49 cm  Malleoli: 23 0 cm    Ambulation     Observational Gait     Additional Observational Gait Details  Pt no longer using AD  Pt is currently ambulating with CAM boot and mild antalgic pattern with decreased stance phase secondary to decreased push off  General Comments:    Stairs:     Single leg stance:   8 seconds prior to LOB on the R    Single leg heel raise:   Pt able to perform half of a heel raise on the R but unable to do so without UE support  Precautions: DDD, Myofascial pain syndrome        Manuals 7/24 7/28 7/31 8/5 8/7 8/28 8/31 9/3  9/8 9/17   PROM of the right knee             PROM of the R ankle 6' 5' 8' 3' 3' 3' 3' 3' 3' 4'   R PFJM             AP TC jt mobs 4' gr III+IV 5' gr III+IV  Gr IV 4' Gr IV 4' Gr IV 4' Gr IV 4' Gr 4 4' Gr IV 4' Gr IV 4'   STM to posteromedial gastroc/soleus 4'             TC Dorsiflexion MWM  nv   2x15 2x15 2x15 30x 30x 30x    Neuro Re-Ed             Single leg stance  C/ yellow foam 5x15" RLE  /c yellow foam :20x4 RLE  5" hold x20  3" x 10  5" x 20  5" x20  10"x10 10x10"   TKE with tb  Bangs 25lbs 3x10  Bangs 25# 3x10          SLR flexion             Biodex WB %    3' no CAM boot- 80%         LAQ             Total gym squats             Standing BAPS board             Step up Lunge pulses on step w RW nv            Leg press 3x10 90# with boot  3x10 100# in boot   100# 3x10 in boot  110# no boot  110# 3x10        Wobble board       S/s, f/b 3' each 3' sides, f/b 3' sides, f/b    Squat on BOSU       3x10 3x10  3x10 3x10   Ther Ex             Ankle TB 4 ways inversion and eversion with BTB 30x            Standing hip extension             Standing hip ABD             Standing hip flexion             NuStep             SB gastroc stretch With RW 4x30" With RW 4x30" With RW :30x4 4x30"  4x30" 4x30" 4x30" 4x30" 4x30" 4x30"   SB soleus stretch With RW 4x30" With RW 4x30" With RW :30x4 4x30" 4x30"  4x30" 4x30" 4x30" 4x30" 4x30"   Prone quad stretch nv         3x30"   Prone hamstring curls             Bicycle 8' lvl 5 8' lvl 5 8' L5 8' L5 8' L5 8' L5 8' L 5 8' L5  8' L5 8' L5   Self knee/ankle PF mobilization in WB kneeling position       10x5"RLE       Anterior tib stretch        10x10" RLE 10x10"  10x10"    Ther Activity             Stair negotiation  2x           Lateral wallks nv            Step up nv 6" 2x10 6" 2x10 in boot  2" 10x 4" 10x        Mini squats nv  20          Seated HR 30x with blk KB Lateral step ups  10x 4" -hold            Standing HR    30x 30x 30x       Gait Training             With walker or crutches             1 crutch Gait training             H-A-T    No AD, no CAM boot 15'         Modalities             CP PRN                                                      1:1 with PT from [id number]  Pt was re-evaluated today x 15 mintues

## 2020-09-21 ENCOUNTER — OFFICE VISIT (OUTPATIENT)
Dept: PHYSICAL THERAPY | Facility: CLINIC | Age: 35
End: 2020-09-21
Payer: OTHER MISCELLANEOUS

## 2020-09-21 DIAGNOSIS — Z48.89 AFTERCARE FOLLOWING SURGERY: ICD-10-CM

## 2020-09-21 DIAGNOSIS — S82.301E TYPE I OR II OPEN FRACTURE OF DISTAL END OF RIGHT TIBIA WITH ROUTINE HEALING, UNSPECIFIED FRACTURE MORPHOLOGY, SUBSEQUENT ENCOUNTER: Primary | ICD-10-CM

## 2020-09-21 PROCEDURE — 97140 MANUAL THERAPY 1/> REGIONS: CPT | Performed by: PHYSICAL THERAPIST

## 2020-09-21 PROCEDURE — 97110 THERAPEUTIC EXERCISES: CPT | Performed by: PHYSICAL THERAPIST

## 2020-09-21 PROCEDURE — 97112 NEUROMUSCULAR REEDUCATION: CPT | Performed by: PHYSICAL THERAPIST

## 2020-09-21 NOTE — PROGRESS NOTES
Daily Note     Today's date: 2020  Patient name: Ayush Alfonso  : 1985  MRN: [id number]  Referring provider: Mable Hector MD  Dx:   Encounter Diagnosis     ICD-10-CM    1  Type I or II open fracture of distal end of right tibia with routine healing, unspecified fracture morphology, subsequent encounter  S82 301E    2  Aftercare following surgery  Z48 89        Start Time: 478  Stop Time: 1230  Total time in clinic (min): 45 minutes    Subjective: Pt reports "doing alright " No changes as per patient  Objective: See treatment diary below      Assessment: Tolerated treatment well  Balance and proprioception improving  Ankle ROM improves after manual treatment and self stretching  Good control with BOSU squats  Patient would benefit from continued PT  Plan: Continue per plan of care        Precautions: DDD, Myofascial pain syndrome      Manuals 7/24 7/28 7/31 8/5 8/7 8/28 8/31 9/3  9/8 9/21   PROM of the right knee             Manual stretching of R ankle 6' 5' 8' 3' 3' 3' 3' 3' 3' 3'   R PFJM             AP TC jt mobs 4' gr III+IV 5' gr III+IV  Gr IV 4' Gr IV 4' Gr IV 4' Gr IV 4' Gr 4 4' Gr IV 4' Gr IV 4'   STM to posteromedial gastroc/soleus 4'             TC Dorsiflexion MWM  nv   2x15 2x15 2x15 30x 30x 30x 30x   Neuro Re-Ed             Single leg stance  C/ yellow foam 5x15" RLE  /c yellow foam :20x4 RLE  5" hold x20  3" x 10  5" x 20  5" x20  10"x10 10x10" foam    TKE with tb  Thornton 25lbs 3x10  Jovanni 25# 3x10          SLR flexion             Biodex WB %    3' no CAM boot- 80%         LAQ             Total gym squats             Standing BAPS board             Step up Lunge pulses on step w RW nv            Leg press 3x10 90# with boot  3x10 100# in boot   100# 3x10 in boot  110# no boot  110# 3x10        Wobble board       S/s, f/b 3' each 3' sides, f/b 3' sides, f/b 2' each    Squat on BOSU       3x10 3x10  3x10 3x10   Ther Ex             Ankle TB 4 ways inversion and eversion with BTB 30x            Standing hip extension             Standing hip ABD             Standing hip flexion             NuStep             SB gastroc stretch With RW 4x30" With RW 4x30" With RW :30x4 4x30"  4x30" 4x30" 4x30" 4x30" 4x30" 4x30" PRO stretch   SB soleus stretch With RW 4x30" With RW 4x30" With RW :30x4 4x30" 4x30"  4x30" 4x30" 4x30" 4x30" 4x30" PRO stretch   Prone quad stretch nv            Prone hamstring curls             Bicycle 8' lvl 5 8' lvl 5 8' L5 8' L5 8' L5 8' L5 8' L 5 8' L5  8' L5 8' L5   Self knee/ankle PF mobilization in WB kneeling position       10x5"RLE       Anterior tib stretch        10x10" RLE 10x10"  10x10"    Ther Activity             Stair negotiation  2x           Lateral wallks nv            Step up nv 6" 2x10 6" 2x10 in boot  2" 10x 4" 10x        Mini squats nv  20          Seated HR 30x with blk KB            Lateral step ups  10x 4" -hold            Standing HR    30x 30x 30x    With yellow MB, 30x    Gait Training             With walker or crutches             1 crutch Gait training             H-A-T    No AD, no CAM boot 15'         Modalities             CP PRN                                                      1:1 with PT from 1145-1230PM

## 2020-09-25 ENCOUNTER — OFFICE VISIT (OUTPATIENT)
Dept: PHYSICAL THERAPY | Facility: CLINIC | Age: 35
End: 2020-09-25
Payer: OTHER MISCELLANEOUS

## 2020-09-25 ENCOUNTER — TELEPHONE (OUTPATIENT)
Dept: OBGYN CLINIC | Facility: HOSPITAL | Age: 35
End: 2020-09-25

## 2020-09-25 DIAGNOSIS — Z48.89 AFTERCARE FOLLOWING SURGERY: ICD-10-CM

## 2020-09-25 DIAGNOSIS — S82.301E TYPE I OR II OPEN FRACTURE OF DISTAL END OF RIGHT TIBIA WITH ROUTINE HEALING, UNSPECIFIED FRACTURE MORPHOLOGY, SUBSEQUENT ENCOUNTER: Primary | ICD-10-CM

## 2020-09-25 PROCEDURE — 97140 MANUAL THERAPY 1/> REGIONS: CPT | Performed by: PHYSICAL THERAPIST

## 2020-09-25 PROCEDURE — 97110 THERAPEUTIC EXERCISES: CPT | Performed by: PHYSICAL THERAPIST

## 2020-09-25 PROCEDURE — 97530 THERAPEUTIC ACTIVITIES: CPT | Performed by: PHYSICAL THERAPIST

## 2020-09-25 NOTE — TELEPHONE ENCOUNTER
Patients WC  Tommy Huff is calling for work status letter and office note from 9/14 faxed to [phone]        Faxed per request

## 2020-09-25 NOTE — PROGRESS NOTES
Daily Note     Today's date: 2020  Patient name: Donovan Mckeon  : 1985  MRN: [id number]  Referring provider: Deidra Chavarria MD  Dx:   Encounter Diagnosis     ICD-10-CM    1  Type I or II open fracture of distal end of right tibia with routine healing, unspecified fracture morphology, subsequent encounter  S82 301E    2  Aftercare following surgery  Z48 89        Start Time: 09  Stop Time: 1030  Total time in clinic (min): 45 minutes    Subjective: Pt reports new onset left lateral hip pain  She believes it's do to increased walking and activities  Objective: See treatment diary below  +FADIR, +Piriformis stretch, +ttp to greater trochanteric region, - SLR, -Scour, - hip quadrant, - BEAN    Assessment: Hip pain seems secondary to compensatory motion and more so muscular in nature rather than intra-articular or lumbar involvement  Addressed hip with several stretches  Tolerated treatment for the ankle  well  ROM and stiffness at [facility] joint seems to be improving, especially after MT and exercise  Mild swelling persists  Patient would benefit from continued PT  Plan: Continue per plan of care        Precautions: DDD, Myofascial pain syndrome      Manuals 9/25     8/28 8/31 9/3  9/8 9/21   PROM of the right knee             Manual stretching of R ankle 3'     3' 3' 3' 3' 3'   R PFJM             AP TC jt mobs Gr IV 4'     Gr IV 4' Gr IV 4' Gr 4 4' Gr IV 4' Gr IV 4'   STM to posteromedial gastroc/soleus             TC Dorsiflexion MWM  2x15     2x15 30x 30x 30x 30x   Neuro Re-Ed             Single leg stance      3" x 10  5" x 20  5" x20  10"x10 10x10" foam    TKE with tb             SLR flexion             Biodex WB %             LAQ             Total gym squats             Standing BAPS board             Step up Lunge pulses on step w RW             Leg press 110# 3x10     110# 3x10        Wobble board       S/s, f/b 3' each 3' sides, f/b 3' sides, f/b 2' each    Squat on BOSU       3x10 3x10  3x10 3x10   Ther Ex             Ankle TB 4 ways             Standing hip extension             Standing hip ABD             Standing hip flexion             NuStep             SB gastroc stretch 4x30"     4x30" 4x30" 4x30" 4x30" 4x30" PRO stretch   SB soleus stretch 4x30"     4x30" 4x30" 4x30" 4x30" 4x30" PRO stretch   Piriformis stretch R side 4x20  "            ITB stretch  R side 4x20  "            Prone quad stretch             Prone hamstring curls             Bicycle 8' LVL 5     8' L5 8' L 5 8' L5  8' L5 8' L5   Self knee/ankle PF mobilization in WB kneeling position       10x5"RLE       Anterior tib stretch        10x10" RLE 10x10"  10x10"    Ther Activity             Stair negotiation             Lateral wallks             Step up      4" 10x        Mini squats             Seated HR             Lateral step ups             R hip hike over step 3x5            Standing HR With yellow MB, 30x      30x    With yellow MB, 30x    Gait Training             With walker or crutches             1 crutch Gait training             H-A-T             Modalities             CP PRN                                                      1:1 with PT from 945-1030PM

## 2020-09-28 ENCOUNTER — OFFICE VISIT (OUTPATIENT)
Dept: PHYSICAL THERAPY | Facility: CLINIC | Age: 35
End: 2020-09-28
Payer: OTHER MISCELLANEOUS

## 2020-09-28 DIAGNOSIS — Z48.89 AFTERCARE FOLLOWING SURGERY: ICD-10-CM

## 2020-09-28 DIAGNOSIS — S82.301E TYPE I OR II OPEN FRACTURE OF DISTAL END OF RIGHT TIBIA WITH ROUTINE HEALING, UNSPECIFIED FRACTURE MORPHOLOGY, SUBSEQUENT ENCOUNTER: Primary | ICD-10-CM

## 2020-09-28 PROCEDURE — 97110 THERAPEUTIC EXERCISES: CPT | Performed by: PHYSICAL THERAPIST

## 2020-09-28 PROCEDURE — 97140 MANUAL THERAPY 1/> REGIONS: CPT | Performed by: PHYSICAL THERAPIST

## 2020-09-28 PROCEDURE — 97112 NEUROMUSCULAR REEDUCATION: CPT | Performed by: PHYSICAL THERAPIST

## 2020-09-28 PROCEDURE — 97530 THERAPEUTIC ACTIVITIES: CPT | Performed by: PHYSICAL THERAPIST

## 2020-09-28 NOTE — PROGRESS NOTES
Daily Note     Today's date: 2020  Patient name: Julio Mercer  : 1985  MRN: [id number]  Referring provider: Sam Jc MD  Dx:   Encounter Diagnosis     ICD-10-CM    1  Type I or II open fracture of distal end of right tibia with routine healing, unspecified fracture morphology, subsequent encounter  S82 301E    2  Aftercare following surgery  Z48 89        Start Time: 1025  Stop Time: 1115  Total time in clinic (min): 50 minutes    Subjective: No new complaints today at right ankle  She notes mild knee stiffness  Objective: See treatment diary below      Assessment: Ankle stiffness and restriction improves with manual therapy and stretching  Performed squats on wobble board for strength and proprioception  Tolerated treatment well  R hip improving with prescribed stretching from last session  Pt demonstrating good SLS control on soft foam (biodex) without UE support  Ankle stability and control is improving  Patient demonstrated fatigue post treatment and would benefit from continued PT  Plan: Continue per plan of care        Precautions: DDD, Myofascial pain syndrome      Manuals 9/25 9/28    8/28 8/31 9/3  9/8 9/21   PROM of the right knee             Manual stretching of R ankle 3' 3'    3' 3' 3' 3' 3'   R PFJM             AP TC jt mobs Gr IV 4' Gr IV 5'    Gr IV 4' Gr IV 4' Gr 4 4' Gr IV 4' Gr IV 4'   STM to posteromedial gastroc/soleus             TC Dorsiflexion MWM  2x15 2x15    2x15 30x 30x 30x 30x   Neuro Re-Ed             Single leg stance  BD foam 10x10"     3" x 10  5" x 20  5" x20  10"x10 10x10" foam    TKE with tb             SLR flexion             Biodex WB %             LAQ             Total gym squats             Standing BAPS board             Step up Lunge pulses on step w RW             Leg press 110# 3x10     110# 3x10        Wobble board       S/s, f/b 3' each 3' sides, f/b 3' sides, f/b 2' each    Squat on BOSU  3x10 on wobble board     3x10 3x10  3x10 3x10   Ther Ex             Ankle TB 4 ways             Standing hip extension             Standing hip ABD             Standing hip flexion             NuStep             SB gastroc stretch 4x30" 4x30" PRO stretch    4x30" 4x30" 4x30" 4x30" 4x30" PRO stretch   SB soleus stretch 4x30" 4x30" SB    4x30" 4x30" 4x30" 4x30" 4x30" PRO stretch   Piriformis stretch R side 4x20  "            ITB stretch  R side 4x20  "            Prone quad stretch             Prone hamstring curls             Bicycle 8' LVL 5 8' LVL 5    8' L5 8' L 5 8' L5  8' L5 8' L5   Self knee/ankle PF mobilization in WB kneeling position       10x5"RLE       Anterior tib stretch        10x10" RLE 10x10"  10x10"    Ther Activity             Stair negotiation             Lateral wallks             Step up      4" 10x        Mini squats             Seated HR             Lateral step ups             R hip hike over step 3x5 3x5           Standing HR With yellow MB, 30x  With yellow MB, 30x     30x    With yellow MB, 30x    Gait Training             With walker or crutches             1 crutch Gait training             H-A-T             Modalities             CP PRN                                                      1:1 with PT from [id number]

## 2020-09-30 ENCOUNTER — TELEPHONE (OUTPATIENT)
Dept: FAMILY MEDICINE CLINIC | Facility: CLINIC | Age: 35
End: 2020-09-30

## 2020-10-01 DIAGNOSIS — J45.40 MODERATE PERSISTENT ASTHMA WITHOUT COMPLICATION: ICD-10-CM

## 2020-10-01 DIAGNOSIS — F41.9 ANXIETY: ICD-10-CM

## 2020-10-01 RX ORDER — BUDESONIDE AND FORMOTEROL FUMARATE DIHYDRATE 160; 4.5 UG/1; UG/1
2 AEROSOL RESPIRATORY (INHALATION) 2 TIMES DAILY
Qty: 3 INHALER | Refills: 1 | Status: SHIPPED | OUTPATIENT
Start: 2020-10-01 | End: 2020-11-05 | Stop reason: SDUPTHER

## 2020-10-01 RX ORDER — ALPRAZOLAM 0.5 MG/1
0.5 TABLET ORAL
Qty: 20 TABLET | Refills: 0 | Status: SHIPPED | OUTPATIENT
Start: 2020-10-01 | End: 2020-11-16 | Stop reason: SDUPTHER

## 2020-10-02 ENCOUNTER — OFFICE VISIT (OUTPATIENT)
Dept: PHYSICAL THERAPY | Facility: CLINIC | Age: 35
End: 2020-10-02
Payer: OTHER MISCELLANEOUS

## 2020-10-02 DIAGNOSIS — Z48.89 AFTERCARE FOLLOWING SURGERY: ICD-10-CM

## 2020-10-02 DIAGNOSIS — S82.301E TYPE I OR II OPEN FRACTURE OF DISTAL END OF RIGHT TIBIA WITH ROUTINE HEALING, UNSPECIFIED FRACTURE MORPHOLOGY, SUBSEQUENT ENCOUNTER: Primary | ICD-10-CM

## 2020-10-02 PROCEDURE — 97110 THERAPEUTIC EXERCISES: CPT | Performed by: PHYSICAL THERAPIST

## 2020-10-02 PROCEDURE — 97140 MANUAL THERAPY 1/> REGIONS: CPT | Performed by: PHYSICAL THERAPIST

## 2020-10-02 PROCEDURE — 97112 NEUROMUSCULAR REEDUCATION: CPT | Performed by: PHYSICAL THERAPIST

## 2020-10-02 PROCEDURE — 97530 THERAPEUTIC ACTIVITIES: CPT | Performed by: PHYSICAL THERAPIST

## 2020-10-07 ENCOUNTER — CLINICAL SUPPORT (OUTPATIENT)
Dept: OBGYN CLINIC | Facility: CLINIC | Age: 35
End: 2020-10-07
Payer: COMMERCIAL

## 2020-10-07 VITALS
HEIGHT: 68 IN | BODY MASS INDEX: 25.46 KG/M2 | SYSTOLIC BLOOD PRESSURE: 108 MMHG | DIASTOLIC BLOOD PRESSURE: 72 MMHG | TEMPERATURE: 98.2 F | WEIGHT: 168 LBS

## 2020-10-07 DIAGNOSIS — Z23 NEED FOR HPV VACCINATION: Primary | ICD-10-CM

## 2020-10-07 PROCEDURE — 90651 9VHPV VACCINE 2/3 DOSE IM: CPT | Performed by: PHYSICIAN ASSISTANT

## 2020-10-07 PROCEDURE — 90471 IMMUNIZATION ADMIN: CPT | Performed by: PHYSICIAN ASSISTANT

## 2020-10-08 ENCOUNTER — OFFICE VISIT (OUTPATIENT)
Dept: PHYSICAL THERAPY | Facility: CLINIC | Age: 35
End: 2020-10-08
Payer: OTHER MISCELLANEOUS

## 2020-10-08 DIAGNOSIS — Z48.89 AFTERCARE FOLLOWING SURGERY: ICD-10-CM

## 2020-10-08 DIAGNOSIS — S82.301E TYPE I OR II OPEN FRACTURE OF DISTAL END OF RIGHT TIBIA WITH ROUTINE HEALING, UNSPECIFIED FRACTURE MORPHOLOGY, SUBSEQUENT ENCOUNTER: Primary | ICD-10-CM

## 2020-10-08 PROCEDURE — 97530 THERAPEUTIC ACTIVITIES: CPT | Performed by: PHYSICAL THERAPIST

## 2020-10-08 PROCEDURE — 97112 NEUROMUSCULAR REEDUCATION: CPT | Performed by: PHYSICAL THERAPIST

## 2020-10-08 PROCEDURE — 97110 THERAPEUTIC EXERCISES: CPT | Performed by: PHYSICAL THERAPIST

## 2020-10-08 PROCEDURE — 97140 MANUAL THERAPY 1/> REGIONS: CPT | Performed by: PHYSICAL THERAPIST

## 2020-10-09 ENCOUNTER — OFFICE VISIT (OUTPATIENT)
Dept: PHYSICAL THERAPY | Facility: CLINIC | Age: 35
End: 2020-10-09
Payer: OTHER MISCELLANEOUS

## 2020-10-09 DIAGNOSIS — S82.301E TYPE I OR II OPEN FRACTURE OF DISTAL END OF RIGHT TIBIA WITH ROUTINE HEALING, UNSPECIFIED FRACTURE MORPHOLOGY, SUBSEQUENT ENCOUNTER: Primary | ICD-10-CM

## 2020-10-09 DIAGNOSIS — Z48.89 AFTERCARE FOLLOWING SURGERY: ICD-10-CM

## 2020-10-09 PROCEDURE — 97110 THERAPEUTIC EXERCISES: CPT | Performed by: PHYSICAL THERAPIST

## 2020-10-09 PROCEDURE — 97112 NEUROMUSCULAR REEDUCATION: CPT | Performed by: PHYSICAL THERAPIST

## 2020-10-09 PROCEDURE — 97140 MANUAL THERAPY 1/> REGIONS: CPT | Performed by: PHYSICAL THERAPIST

## 2020-10-09 PROCEDURE — 97530 THERAPEUTIC ACTIVITIES: CPT | Performed by: PHYSICAL THERAPIST

## 2020-10-12 ENCOUNTER — OFFICE VISIT (OUTPATIENT)
Dept: PHYSICAL THERAPY | Facility: CLINIC | Age: 35
End: 2020-10-12
Payer: OTHER MISCELLANEOUS

## 2020-10-12 DIAGNOSIS — S82.301E TYPE I OR II OPEN FRACTURE OF DISTAL END OF RIGHT TIBIA WITH ROUTINE HEALING, UNSPECIFIED FRACTURE MORPHOLOGY, SUBSEQUENT ENCOUNTER: Primary | ICD-10-CM

## 2020-10-12 DIAGNOSIS — Z48.89 AFTERCARE FOLLOWING SURGERY: ICD-10-CM

## 2020-10-12 PROCEDURE — 97530 THERAPEUTIC ACTIVITIES: CPT | Performed by: PHYSICAL THERAPIST

## 2020-10-12 PROCEDURE — 97110 THERAPEUTIC EXERCISES: CPT | Performed by: PHYSICAL THERAPIST

## 2020-10-12 PROCEDURE — 97140 MANUAL THERAPY 1/> REGIONS: CPT | Performed by: PHYSICAL THERAPIST

## 2020-10-12 PROCEDURE — 97112 NEUROMUSCULAR REEDUCATION: CPT | Performed by: PHYSICAL THERAPIST

## 2020-10-16 ENCOUNTER — OFFICE VISIT (OUTPATIENT)
Dept: PHYSICAL THERAPY | Facility: CLINIC | Age: 35
End: 2020-10-16
Payer: OTHER MISCELLANEOUS

## 2020-10-16 DIAGNOSIS — S82.301E TYPE I OR II OPEN FRACTURE OF DISTAL END OF RIGHT TIBIA WITH ROUTINE HEALING, UNSPECIFIED FRACTURE MORPHOLOGY, SUBSEQUENT ENCOUNTER: Primary | ICD-10-CM

## 2020-10-16 DIAGNOSIS — Z48.89 AFTERCARE FOLLOWING SURGERY: ICD-10-CM

## 2020-10-16 PROCEDURE — 97140 MANUAL THERAPY 1/> REGIONS: CPT | Performed by: PHYSICAL THERAPIST

## 2020-10-16 PROCEDURE — 97530 THERAPEUTIC ACTIVITIES: CPT | Performed by: PHYSICAL THERAPIST

## 2020-10-16 PROCEDURE — 97112 NEUROMUSCULAR REEDUCATION: CPT | Performed by: PHYSICAL THERAPIST

## 2020-10-16 PROCEDURE — 97110 THERAPEUTIC EXERCISES: CPT | Performed by: PHYSICAL THERAPIST

## 2020-10-19 ENCOUNTER — OFFICE VISIT (OUTPATIENT)
Dept: PHYSICAL THERAPY | Facility: CLINIC | Age: 35
End: 2020-10-19
Payer: OTHER MISCELLANEOUS

## 2020-10-19 DIAGNOSIS — Z48.89 AFTERCARE FOLLOWING SURGERY: ICD-10-CM

## 2020-10-19 DIAGNOSIS — S82.301E TYPE I OR II OPEN FRACTURE OF DISTAL END OF RIGHT TIBIA WITH ROUTINE HEALING, UNSPECIFIED FRACTURE MORPHOLOGY, SUBSEQUENT ENCOUNTER: Primary | ICD-10-CM

## 2020-10-19 PROCEDURE — 97112 NEUROMUSCULAR REEDUCATION: CPT | Performed by: PHYSICAL THERAPIST

## 2020-10-19 PROCEDURE — 97140 MANUAL THERAPY 1/> REGIONS: CPT | Performed by: PHYSICAL THERAPIST

## 2020-10-19 PROCEDURE — 97110 THERAPEUTIC EXERCISES: CPT | Performed by: PHYSICAL THERAPIST

## 2020-10-19 PROCEDURE — 97530 THERAPEUTIC ACTIVITIES: CPT | Performed by: PHYSICAL THERAPIST

## 2020-10-23 ENCOUNTER — EVALUATION (OUTPATIENT)
Dept: PHYSICAL THERAPY | Facility: CLINIC | Age: 35
End: 2020-10-23
Payer: OTHER MISCELLANEOUS

## 2020-10-23 DIAGNOSIS — S82.301E TYPE I OR II OPEN FRACTURE OF DISTAL END OF RIGHT TIBIA WITH ROUTINE HEALING, UNSPECIFIED FRACTURE MORPHOLOGY, SUBSEQUENT ENCOUNTER: Primary | ICD-10-CM

## 2020-10-23 DIAGNOSIS — Z48.89 AFTERCARE FOLLOWING SURGERY: ICD-10-CM

## 2020-10-23 PROCEDURE — 97530 THERAPEUTIC ACTIVITIES: CPT | Performed by: PHYSICAL THERAPIST

## 2020-10-23 PROCEDURE — 97112 NEUROMUSCULAR REEDUCATION: CPT | Performed by: PHYSICAL THERAPIST

## 2020-10-23 PROCEDURE — 97110 THERAPEUTIC EXERCISES: CPT | Performed by: PHYSICAL THERAPIST

## 2020-10-23 PROCEDURE — 97140 MANUAL THERAPY 1/> REGIONS: CPT | Performed by: PHYSICAL THERAPIST

## 2020-10-28 ENCOUNTER — OFFICE VISIT (OUTPATIENT)
Dept: PHYSICAL THERAPY | Facility: CLINIC | Age: 35
End: 2020-10-28
Payer: OTHER MISCELLANEOUS

## 2020-10-28 DIAGNOSIS — S82.301E TYPE I OR II OPEN FRACTURE OF DISTAL END OF RIGHT TIBIA WITH ROUTINE HEALING, UNSPECIFIED FRACTURE MORPHOLOGY, SUBSEQUENT ENCOUNTER: Primary | ICD-10-CM

## 2020-10-28 DIAGNOSIS — Z48.89 AFTERCARE FOLLOWING SURGERY: ICD-10-CM

## 2020-10-28 PROCEDURE — 97110 THERAPEUTIC EXERCISES: CPT | Performed by: PHYSICAL THERAPIST

## 2020-10-28 PROCEDURE — 97530 THERAPEUTIC ACTIVITIES: CPT | Performed by: PHYSICAL THERAPIST

## 2020-10-28 PROCEDURE — 97140 MANUAL THERAPY 1/> REGIONS: CPT | Performed by: PHYSICAL THERAPIST

## 2020-10-28 PROCEDURE — 97112 NEUROMUSCULAR REEDUCATION: CPT | Performed by: PHYSICAL THERAPIST

## 2020-10-29 ENCOUNTER — OFFICE VISIT (OUTPATIENT)
Dept: PHYSICAL THERAPY | Facility: CLINIC | Age: 35
End: 2020-10-29
Payer: OTHER MISCELLANEOUS

## 2020-10-29 DIAGNOSIS — S82.301E TYPE I OR II OPEN FRACTURE OF DISTAL END OF RIGHT TIBIA WITH ROUTINE HEALING, UNSPECIFIED FRACTURE MORPHOLOGY, SUBSEQUENT ENCOUNTER: Primary | ICD-10-CM

## 2020-10-29 DIAGNOSIS — Z48.89 AFTERCARE FOLLOWING SURGERY: ICD-10-CM

## 2020-10-29 PROCEDURE — 97110 THERAPEUTIC EXERCISES: CPT | Performed by: PHYSICAL THERAPIST

## 2020-10-29 PROCEDURE — 97112 NEUROMUSCULAR REEDUCATION: CPT | Performed by: PHYSICAL THERAPIST

## 2020-10-29 PROCEDURE — 97140 MANUAL THERAPY 1/> REGIONS: CPT | Performed by: PHYSICAL THERAPIST

## 2020-11-04 ENCOUNTER — OFFICE VISIT (OUTPATIENT)
Dept: PHYSICAL THERAPY | Facility: CLINIC | Age: 35
End: 2020-11-04
Payer: OTHER MISCELLANEOUS

## 2020-11-04 DIAGNOSIS — S82.301E TYPE I OR II OPEN FRACTURE OF DISTAL END OF RIGHT TIBIA WITH ROUTINE HEALING, UNSPECIFIED FRACTURE MORPHOLOGY, SUBSEQUENT ENCOUNTER: Primary | ICD-10-CM

## 2020-11-04 DIAGNOSIS — Z48.89 AFTERCARE FOLLOWING SURGERY: ICD-10-CM

## 2020-11-04 PROCEDURE — 97140 MANUAL THERAPY 1/> REGIONS: CPT | Performed by: PHYSICAL THERAPIST

## 2020-11-04 PROCEDURE — 97110 THERAPEUTIC EXERCISES: CPT | Performed by: PHYSICAL THERAPIST

## 2020-11-04 PROCEDURE — 97112 NEUROMUSCULAR REEDUCATION: CPT | Performed by: PHYSICAL THERAPIST

## 2020-11-05 DIAGNOSIS — J45.40 MODERATE PERSISTENT ASTHMA WITHOUT COMPLICATION: ICD-10-CM

## 2020-11-05 RX ORDER — BUDESONIDE AND FORMOTEROL FUMARATE DIHYDRATE 160; 4.5 UG/1; UG/1
2 AEROSOL RESPIRATORY (INHALATION) 2 TIMES DAILY
Qty: 3 INHALER | Refills: 1 | Status: SHIPPED | OUTPATIENT
Start: 2020-11-05 | End: 2021-11-26 | Stop reason: SDUPTHER

## 2020-11-06 ENCOUNTER — OFFICE VISIT (OUTPATIENT)
Dept: PHYSICAL THERAPY | Facility: CLINIC | Age: 35
End: 2020-11-06
Payer: OTHER MISCELLANEOUS

## 2020-11-06 DIAGNOSIS — Z48.89 AFTERCARE FOLLOWING SURGERY: ICD-10-CM

## 2020-11-06 DIAGNOSIS — S82.301E TYPE I OR II OPEN FRACTURE OF DISTAL END OF RIGHT TIBIA WITH ROUTINE HEALING, UNSPECIFIED FRACTURE MORPHOLOGY, SUBSEQUENT ENCOUNTER: Primary | ICD-10-CM

## 2020-11-06 PROCEDURE — 97110 THERAPEUTIC EXERCISES: CPT | Performed by: PHYSICAL THERAPIST

## 2020-11-06 PROCEDURE — 97140 MANUAL THERAPY 1/> REGIONS: CPT | Performed by: PHYSICAL THERAPIST

## 2020-11-06 PROCEDURE — 97112 NEUROMUSCULAR REEDUCATION: CPT | Performed by: PHYSICAL THERAPIST

## 2020-11-06 PROCEDURE — 97530 THERAPEUTIC ACTIVITIES: CPT | Performed by: PHYSICAL THERAPIST

## 2020-11-11 ENCOUNTER — OFFICE VISIT (OUTPATIENT)
Dept: PHYSICAL THERAPY | Facility: CLINIC | Age: 35
End: 2020-11-11
Payer: OTHER MISCELLANEOUS

## 2020-11-11 DIAGNOSIS — S82.301E TYPE I OR II OPEN FRACTURE OF DISTAL END OF RIGHT TIBIA WITH ROUTINE HEALING, UNSPECIFIED FRACTURE MORPHOLOGY, SUBSEQUENT ENCOUNTER: Primary | ICD-10-CM

## 2020-11-11 DIAGNOSIS — Z48.89 AFTERCARE FOLLOWING SURGERY: ICD-10-CM

## 2020-11-11 PROCEDURE — 97140 MANUAL THERAPY 1/> REGIONS: CPT | Performed by: PHYSICAL THERAPIST

## 2020-11-11 PROCEDURE — 97110 THERAPEUTIC EXERCISES: CPT | Performed by: PHYSICAL THERAPIST

## 2020-11-11 PROCEDURE — 97112 NEUROMUSCULAR REEDUCATION: CPT | Performed by: PHYSICAL THERAPIST

## 2020-11-11 PROCEDURE — 97530 THERAPEUTIC ACTIVITIES: CPT | Performed by: PHYSICAL THERAPIST

## 2020-11-13 ENCOUNTER — OFFICE VISIT (OUTPATIENT)
Dept: PHYSICAL THERAPY | Facility: CLINIC | Age: 35
End: 2020-11-13
Payer: OTHER MISCELLANEOUS

## 2020-11-13 DIAGNOSIS — S82.301E TYPE I OR II OPEN FRACTURE OF DISTAL END OF RIGHT TIBIA WITH ROUTINE HEALING, UNSPECIFIED FRACTURE MORPHOLOGY, SUBSEQUENT ENCOUNTER: Primary | ICD-10-CM

## 2020-11-13 DIAGNOSIS — Z48.89 AFTERCARE FOLLOWING SURGERY: ICD-10-CM

## 2020-11-13 PROCEDURE — 97110 THERAPEUTIC EXERCISES: CPT | Performed by: PHYSICAL THERAPIST

## 2020-11-13 PROCEDURE — 97530 THERAPEUTIC ACTIVITIES: CPT | Performed by: PHYSICAL THERAPIST

## 2020-11-13 PROCEDURE — 97140 MANUAL THERAPY 1/> REGIONS: CPT | Performed by: PHYSICAL THERAPIST

## 2020-11-16 ENCOUNTER — OFFICE VISIT (OUTPATIENT)
Dept: PHYSICAL THERAPY | Facility: CLINIC | Age: 35
End: 2020-11-16
Payer: OTHER MISCELLANEOUS

## 2020-11-16 ENCOUNTER — OFFICE VISIT (OUTPATIENT)
Dept: FAMILY MEDICINE CLINIC | Facility: CLINIC | Age: 35
End: 2020-11-16
Payer: COMMERCIAL

## 2020-11-16 VITALS
TEMPERATURE: 98.4 F | HEIGHT: 68 IN | DIASTOLIC BLOOD PRESSURE: 68 MMHG | SYSTOLIC BLOOD PRESSURE: 112 MMHG | WEIGHT: 176 LBS | HEART RATE: 118 BPM | RESPIRATION RATE: 18 BRPM | OXYGEN SATURATION: 97 % | BODY MASS INDEX: 26.67 KG/M2

## 2020-11-16 DIAGNOSIS — J45.40 MODERATE PERSISTENT ASTHMA WITHOUT COMPLICATION: ICD-10-CM

## 2020-11-16 DIAGNOSIS — F41.0 PANIC DISORDER WITHOUT AGORAPHOBIA: ICD-10-CM

## 2020-11-16 DIAGNOSIS — Z48.89 AFTERCARE FOLLOWING SURGERY: ICD-10-CM

## 2020-11-16 DIAGNOSIS — F41.9 ANXIETY: ICD-10-CM

## 2020-11-16 DIAGNOSIS — S82.301E TYPE I OR II OPEN FRACTURE OF DISTAL END OF RIGHT TIBIA WITH ROUTINE HEALING, UNSPECIFIED FRACTURE MORPHOLOGY, SUBSEQUENT ENCOUNTER: Primary | ICD-10-CM

## 2020-11-16 DIAGNOSIS — F43.10 PTSD (POST-TRAUMATIC STRESS DISORDER): Primary | ICD-10-CM

## 2020-11-16 PROCEDURE — 97112 NEUROMUSCULAR REEDUCATION: CPT | Performed by: PHYSICAL THERAPIST

## 2020-11-16 PROCEDURE — 97110 THERAPEUTIC EXERCISES: CPT | Performed by: PHYSICAL THERAPIST

## 2020-11-16 PROCEDURE — 97530 THERAPEUTIC ACTIVITIES: CPT | Performed by: PHYSICAL THERAPIST

## 2020-11-16 PROCEDURE — 99214 OFFICE O/P EST MOD 30 MIN: CPT | Performed by: FAMILY MEDICINE

## 2020-11-16 PROCEDURE — 1036F TOBACCO NON-USER: CPT | Performed by: FAMILY MEDICINE

## 2020-11-16 PROCEDURE — 97140 MANUAL THERAPY 1/> REGIONS: CPT | Performed by: PHYSICAL THERAPIST

## 2020-11-16 RX ORDER — FLUOXETINE 10 MG/1
10 CAPSULE ORAL DAILY
Qty: 30 CAPSULE | Refills: 1 | Status: SHIPPED | OUTPATIENT
Start: 2020-11-16 | End: 2020-12-21

## 2020-11-16 RX ORDER — ALPRAZOLAM 0.5 MG/1
0.5 TABLET ORAL
Qty: 20 TABLET | Refills: 0 | Status: SHIPPED | OUTPATIENT
Start: 2020-11-16 | End: 2021-01-26 | Stop reason: SDUPTHER

## 2020-11-19 ENCOUNTER — OFFICE VISIT (OUTPATIENT)
Dept: PHYSICAL THERAPY | Facility: CLINIC | Age: 35
End: 2020-11-19
Payer: OTHER MISCELLANEOUS

## 2020-11-19 DIAGNOSIS — S82.301E TYPE I OR II OPEN FRACTURE OF DISTAL END OF RIGHT TIBIA WITH ROUTINE HEALING, UNSPECIFIED FRACTURE MORPHOLOGY, SUBSEQUENT ENCOUNTER: Primary | ICD-10-CM

## 2020-11-19 DIAGNOSIS — Z48.89 AFTERCARE FOLLOWING SURGERY: ICD-10-CM

## 2020-11-19 PROCEDURE — 97110 THERAPEUTIC EXERCISES: CPT | Performed by: PHYSICAL THERAPIST

## 2020-11-19 PROCEDURE — 97530 THERAPEUTIC ACTIVITIES: CPT | Performed by: PHYSICAL THERAPIST

## 2020-11-19 PROCEDURE — 97140 MANUAL THERAPY 1/> REGIONS: CPT | Performed by: PHYSICAL THERAPIST

## 2020-11-23 ENCOUNTER — HOSPITAL ENCOUNTER (OUTPATIENT)
Dept: RADIOLOGY | Facility: HOSPITAL | Age: 35
Discharge: HOME | End: 2020-11-23
Attending: ORTHOPAEDIC SURGERY
Payer: COMMERCIAL

## 2020-11-23 ENCOUNTER — OFFICE VISIT (OUTPATIENT)
Dept: OBGYN CLINIC | Facility: HOSPITAL | Age: 35
End: 2020-11-23
Payer: OTHER MISCELLANEOUS

## 2020-11-23 VITALS
WEIGHT: 178 LBS | DIASTOLIC BLOOD PRESSURE: 82 MMHG | BODY MASS INDEX: 26.98 KG/M2 | HEART RATE: 106 BPM | HEIGHT: 68 IN | SYSTOLIC BLOOD PRESSURE: 115 MMHG

## 2020-11-23 DIAGNOSIS — Z48.89 AFTERCARE FOLLOWING SURGERY: ICD-10-CM

## 2020-11-23 DIAGNOSIS — Z48.89 AFTERCARE FOLLOWING SURGERY: Primary | ICD-10-CM

## 2020-11-23 PROCEDURE — 73590 X-RAY EXAM OF LOWER LEG: CPT

## 2020-11-23 PROCEDURE — 3008F BODY MASS INDEX DOCD: CPT | Performed by: FAMILY MEDICINE

## 2020-11-23 PROCEDURE — 99214 OFFICE O/P EST MOD 30 MIN: CPT | Performed by: ORTHOPAEDIC SURGERY

## 2020-12-08 ENCOUNTER — TELEPHONE (OUTPATIENT)
Dept: OBGYN CLINIC | Facility: HOSPITAL | Age: 35
End: 2020-12-08

## 2020-12-08 ENCOUNTER — TELEMEDICINE (OUTPATIENT)
Dept: FAMILY MEDICINE CLINIC | Facility: CLINIC | Age: 35
End: 2020-12-08
Payer: COMMERCIAL

## 2020-12-08 DIAGNOSIS — R11.0 NAUSEA: Primary | ICD-10-CM

## 2020-12-08 DIAGNOSIS — R09.82 POSTNASAL DISCHARGE: ICD-10-CM

## 2020-12-08 PROCEDURE — 99213 OFFICE O/P EST LOW 20 MIN: CPT | Performed by: FAMILY MEDICINE

## 2020-12-08 PROCEDURE — 1036F TOBACCO NON-USER: CPT | Performed by: FAMILY MEDICINE

## 2020-12-08 RX ORDER — PROMETHAZINE HYDROCHLORIDE 25 MG/1
25 TABLET ORAL EVERY 6 HOURS PRN
Qty: 10 TABLET | Refills: 1 | Status: SHIPPED | OUTPATIENT
Start: 2020-12-08 | End: 2020-12-21 | Stop reason: ALTCHOICE

## 2020-12-10 ENCOUNTER — TELEPHONE (OUTPATIENT)
Dept: OBGYN CLINIC | Facility: HOSPITAL | Age: 35
End: 2020-12-10

## 2020-12-21 ENCOUNTER — OFFICE VISIT (OUTPATIENT)
Dept: FAMILY MEDICINE CLINIC | Facility: CLINIC | Age: 35
End: 2020-12-21
Payer: COMMERCIAL

## 2020-12-21 VITALS
DIASTOLIC BLOOD PRESSURE: 66 MMHG | HEART RATE: 88 BPM | WEIGHT: 172 LBS | HEIGHT: 68 IN | SYSTOLIC BLOOD PRESSURE: 120 MMHG | TEMPERATURE: 97.9 F | BODY MASS INDEX: 26.07 KG/M2

## 2020-12-21 DIAGNOSIS — J30.89 NON-SEASONAL ALLERGIC RHINITIS DUE TO OTHER ALLERGIC TRIGGER: Primary | ICD-10-CM

## 2020-12-21 DIAGNOSIS — F41.9 ANXIETY: ICD-10-CM

## 2020-12-21 DIAGNOSIS — F43.10 PTSD (POST-TRAUMATIC STRESS DISORDER): ICD-10-CM

## 2020-12-21 DIAGNOSIS — J45.40 MODERATE PERSISTENT ASTHMA WITHOUT COMPLICATION: ICD-10-CM

## 2020-12-21 PROCEDURE — 99213 OFFICE O/P EST LOW 20 MIN: CPT | Performed by: FAMILY MEDICINE

## 2020-12-21 PROCEDURE — 1036F TOBACCO NON-USER: CPT | Performed by: FAMILY MEDICINE

## 2020-12-21 PROCEDURE — 3008F BODY MASS INDEX DOCD: CPT | Performed by: FAMILY MEDICINE

## 2020-12-21 RX ORDER — FLUOXETINE HYDROCHLORIDE 20 MG/1
20 CAPSULE ORAL DAILY
Qty: 30 CAPSULE | Refills: 5 | Status: SHIPPED | OUTPATIENT
Start: 2020-12-21 | End: 2021-09-13 | Stop reason: ALTCHOICE

## 2020-12-21 RX ORDER — FLUTICASONE PROPIONATE 50 MCG
1 SPRAY, SUSPENSION (ML) NASAL DAILY PRN
Qty: 1 BOTTLE | Refills: 5 | Status: SHIPPED | OUTPATIENT
Start: 2020-12-21 | End: 2022-04-22 | Stop reason: SDUPTHER

## 2021-01-04 ENCOUNTER — TELEPHONE (OUTPATIENT)
Dept: OBGYN CLINIC | Facility: CLINIC | Age: 36
End: 2021-01-04

## 2021-01-04 ENCOUNTER — OFFICE VISIT (OUTPATIENT)
Dept: OBGYN CLINIC | Facility: CLINIC | Age: 36
End: 2021-01-04
Payer: COMMERCIAL

## 2021-01-04 VITALS
BODY MASS INDEX: 25.91 KG/M2 | DIASTOLIC BLOOD PRESSURE: 70 MMHG | WEIGHT: 171 LBS | HEIGHT: 68 IN | SYSTOLIC BLOOD PRESSURE: 120 MMHG

## 2021-01-04 DIAGNOSIS — N89.8 VAGINAL DISCHARGE: ICD-10-CM

## 2021-01-04 DIAGNOSIS — R35.0 URINARY FREQUENCY: ICD-10-CM

## 2021-01-04 DIAGNOSIS — Z72.51 HIGH RISK HETEROSEXUAL BEHAVIOR: ICD-10-CM

## 2021-01-04 DIAGNOSIS — B96.89 BV (BACTERIAL VAGINOSIS): Primary | ICD-10-CM

## 2021-01-04 DIAGNOSIS — N76.0 BV (BACTERIAL VAGINOSIS): Primary | ICD-10-CM

## 2021-01-04 LAB
BV WHIFF TEST VAG QL: POSITIVE
CLUE CELLS SPEC QL WET PREP: ABNORMAL
PH SMN: 5.5 [PH]
SL AMB  POCT GLUCOSE, UA: 1
SL AMB LEUKOCYTE ESTERASE,UA: NEGATIVE
SL AMB POCT BILIRUBIN,UA: NEGATIVE
SL AMB POCT BLOOD,UA: NEGATIVE
SL AMB POCT CLARITY,UA: CLEAR
SL AMB POCT COLOR,UA: YELLOW
SL AMB POCT KETONES,UA: NEGATIVE
SL AMB POCT NITRITE,UA: NEGATIVE
SL AMB POCT PH,UA: 7
SL AMB POCT SPECIFIC GRAVITY,UA: 1.01
SL AMB POCT URINE PROTEIN: NEGATIVE
SL AMB POCT UROBILINOGEN: 0.2
SL AMB POCT WET MOUNT: POSITIVE
T VAGINALIS VAG QL WET PREP: ABNORMAL
YEAST VAG QL WET PREP: NEGATIVE

## 2021-01-04 PROCEDURE — 1036F TOBACCO NON-USER: CPT | Performed by: NURSE PRACTITIONER

## 2021-01-04 PROCEDURE — 3008F BODY MASS INDEX DOCD: CPT | Performed by: NURSE PRACTITIONER

## 2021-01-04 PROCEDURE — 81002 URINALYSIS NONAUTO W/O SCOPE: CPT | Performed by: NURSE PRACTITIONER

## 2021-01-04 PROCEDURE — 87491 CHLMYD TRACH DNA AMP PROBE: CPT | Performed by: NURSE PRACTITIONER

## 2021-01-04 PROCEDURE — 99213 OFFICE O/P EST LOW 20 MIN: CPT | Performed by: NURSE PRACTITIONER

## 2021-01-04 PROCEDURE — 87210 SMEAR WET MOUNT SALINE/INK: CPT | Performed by: NURSE PRACTITIONER

## 2021-01-04 PROCEDURE — 87591 N.GONORRHOEAE DNA AMP PROB: CPT | Performed by: NURSE PRACTITIONER

## 2021-01-04 RX ORDER — METRONIDAZOLE 500 MG/1
500 TABLET ORAL EVERY 12 HOURS SCHEDULED
Qty: 14 TABLET | Refills: 0 | Status: SHIPPED | OUTPATIENT
Start: 2021-01-04 | End: 2021-01-11

## 2021-01-04 NOTE — PATIENT INSTRUCTIONS
Metronidazole (By mouth)   Metronidazole (met-jennifer-ZAYRA-da-zole)  Treats bacterial infections  Brand Name(s): Flagyl, Flagyl 375   There may be other brand names for this medicine  When This Medicine Should Not Be Used: This medicine is not right for everyone  Do not use if you had an allergic reaction to metronidazole or similar medicines  Do not use this medicine to treat trichomoniasis if you are in the first 3 months of pregnancy  How to Use This Medicine:   Capsule, Tablet, Long Acting Tablet  · Take your medicine as directed  Your dose may need to be changed several times to find what works best for you  · Capsule: Take with food or milk to avoid upset stomach  · Extended-release tablet: Take it on an empty stomach, 1 hour before or 2 hours after a meal   · Swallow the extended-release tablet whole  Do not crush, break, or chew it  · Take all of the medicine in your prescription to clear up your infection, even if you feel better after the first few doses  · Missed dose: Take a dose as soon as you remember  If it is almost time for your next dose, wait until then and take a regular dose  Do not take extra medicine to make up for a missed dose  · Store the medicine in a closed container at room temperature, away from heat, moisture, and direct light  Drugs and Foods to Avoid:   Ask your doctor or pharmacist before using any other medicine, including over-the-counter medicines, vitamins, and herbal products  · Do not use this medicine if you have taken disulfiram within the last 2 weeks  Do not drink alcohol or use medicine that contains alcohol or propylene glycol while using this medicine and for at least 3 days after treatment  · Some foods and medicines can affect how metronidazole works  Tell your doctor if you are using busulfan, cimetidine, lithium, phenobarbital, phenytoin, or a blood thinner (including warfarin)    Warnings While Using This Medicine:   · Tell your doctor if you are pregnant or breastfeeding, or if you have kidney disease, liver disease, a yeast infection (including oral thrush), Cockayne syndrome (genetic disorder), or a history of blood or bone marrow problems or seizures  · This medicine may cause the following problems:  ? Brain or nervous system problems, including peripheral neuropathy, encephalopathy, seizures, meningitis, or vision problems  ? Liver problems, which may be life-threatening  ? Serious skin reactions  · Trichomoniasis treatment:  Your doctor may want to also treat your sexual partner, even if he or she has no symptoms  Also, you may want to use a condom during sexual intercourse  These measures will help keep you from getting the infection back again from your partner  If you have any questions, ask your doctor  · Call your doctor if your symptoms do not improve or if they get worse  · Your doctor will do lab tests at regular visits to check on the effects of this medicine  Keep all appointments  · Tell any doctor or dentist who treats you that you are using this medicine  This medicine may affect certain medical test results  · Keep all medicine out of the reach of children  Never share your medicine with anyone    Possible Side Effects While Using This Medicine:   Call your doctor right away if you notice any of these side effects:  · Allergic reaction: Itching or hives, swelling in your face or hands, swelling or tingling in your mouth or throat, chest tightness, trouble breathing  · Blistering, peeling, red skin rash  · Confusion, drowsiness, headache, stiff neck or back  · Dark urine or pale stools, nausea, vomiting, loss of appetite, stomach pain, yellow skin or eyes  · Dizziness, problems with muscle control, clumsiness, shakiness, trouble talking  · Fever, chills, cough, sore throat, body aches  · Numbness, tingling, or burning pain in your hands, arms, legs, or feet  · Seizures  If you notice these less serious side effects, talk with your doctor: · Unusual or unpleasant taste in your mouth  If you notice other side effects that you think are caused by this medicine, tell your doctor  Call your doctor for medical advice about side effects  You may report side effects to FDA at 5-166-GLS-7985  © Copyright [address] Information is for End User's use only and may not be sold, redistributed or otherwise used for commercial purposes  The above information is an  only  It is not intended as medical advice for individual conditions or treatments  Talk to your doctor, nurse or pharmacist before following any medical regimen to see if it is safe and effective for you  Bacterial Vaginosis   WHAT YOU NEED TO KNOW:   What is bacterial vaginosis? Bacterial vaginosis is an infection in the vagina  It may cause vaginitis (irritation and inflammation of the vagina)  The cause is not known  Bacteria normally found in the vagina are imbalanced  Your risk increases if you are sexually active, you use a douche, or you have an intrauterine device (IUD)  What are common signs and symptoms of bacterial vaginosis? · White, gray, or yellow vaginal discharge    · Vaginal discharge that smells like fish    · Itching or burning around the outside of your vagina    How is bacterial vaginosis diagnosed? Your healthcare provider will examine you and ask if you have other health conditions  He or she may need to take a sample of fluid from your vagina  This will be tested for the bacteria that causes vaginosis  How is bacterial vaginosis treated? Antibiotics are given to kill the bacteria  They may be given as a pill or as a cream to put in your vagina  What do I need to know about bacterial vaginosis and pregnancy? If you have bacterial vaginosis during pregnancy, your baby may be born early or have a low birth weight  Your healthcare provider may recommend testing for bacterial vaginosis before or during your pregnancy   He or she will talk to you about your risk for premature delivery, and make sure you know the benefits and risks of testing  How can I prevent bacterial vaginosis? · Keep your vaginal area clean and dry  Wear underwear and pantyhose with a cotton crotch  Wipe from front to back after you urinate or have a bowel movement  After you bathe, rinse soap from your vaginal area to decrease your risk for irritation  · Do not use products that cause irritation  Always use unscented tampons or sanitary pads  Do not use feminine sprays, powders, or scented tampons  They may cause irritation and increase your risk for vaginosis  Detergents and fabric softeners may also cause irritation  · Do not use a douche  This can cause an imbalance in healthy vaginal bacteria  · Use latex condoms during sex  This helps prevent another infection and keeps your partner from getting the infection  When should I call my doctor or gynecologist?   · Your symptoms come back or do not improve with treatment  · You have vaginal bleeding that is not your monthly period  · You have questions or concerns about your condition or care  CARE AGREEMENT:   You have the right to help plan your care  Learn about your health condition and how it may be treated  Discuss treatment options with your healthcare providers to decide what care you want to receive  You always have the right to refuse treatment  The above information is an  only  It is not intended as medical advice for individual conditions or treatments  Talk to your doctor, nurse or pharmacist before following any medical regimen to see if it is safe and effective for you  © Copyright [address] Information is for End User's use only and may not be sold, redistributed or otherwise used for commercial purposes   All illustrations and images included in CareNotes® are the copyrighted property of [employer]  or [address] [place], [place] [address]VBOX

## 2021-01-04 NOTE — TELEPHONE ENCOUNTER
Vaginal discharge started about two weeks ago  Has not tried anything OTC  Discharge is white  Odor stronger - denies fishy  Experiencing pelvic pain during intercourse  Pt has seen two tiny spots after intercourse  Pt coming in for apt today

## 2021-01-04 NOTE — PROGRESS NOTES
Assessment/Plan:      Diagnoses and all orders for this visit:    BV (bacterial vaginosis)  -     metroNIDAZOLE (FLAGYL) 500 mg tablet; Take 1 tablet (500 mg total) by mouth every 12 (twelve) hours for 7 days    Vaginal discharge  -     POCT wet mount  -     POCT urine dip    High risk heterosexual behavior  -     Chlamydia/GC amplified DNA by PCR    Urinary frequency  -     POCT urine dip      -reviewed diagnosis of bacterial vaginosis and treatment with metronidazole  Written information provided  Patient verbalized understanding to avoid all use while taking medication  -urine dip negative for nitrites, blood and leukocytes  -hygiene reviewed including avoid douching, scented soaps, lotions, lubricants, tight/restrictive clothing  -safe sexual practices including consistent condom use encouraged  -signs and symptoms report reviewed  -will call with results    RTO 3/2021 for annual exam or sooner as needed    Subjective:     Patient ID: Adia Cowan is a 37 y o  female  HPI here with complaints of vaginal discharge for approximately 3 weeks  Vaginal discharge is intermittent, thick, white with a foul odor  Denies itching or external irritation  Associated symptoms include frequent urination  Denies urgency, pain with urination and incontinence  Denies alleviating or aggravating factors  Has not used any over-the-counter remedies  Is sexually active status post hysterectomy  Admits to new partner and desires chlamydia/gonorrhea testing today  Denies pelvic pain, fever, chills bowel concerns  Last Pap smear 3/18/20 resulted NILM/ HR HPV negative    Review of Systems   Constitutional: Negative for chills, fatigue and fever  Respiratory: Negative  Cardiovascular: Negative  Genitourinary: Positive for frequency and vaginal discharge   Negative for decreased urine volume, difficulty urinating, dysuria, flank pain, genital sores, menstrual problem, pelvic pain, vaginal bleeding and vaginal pain    Neurological: Negative for headaches  Objective:     Physical Exam  Constitutional:       Appearance: Normal appearance  Cardiovascular:      Rate and Rhythm: Normal rate and regular rhythm  Pulmonary:      Effort: Pulmonary effort is normal       Breath sounds: Normal breath sounds  Abdominal:      Hernia: There is no hernia in the left inguinal area or right inguinal area  Genitourinary:     Exam position: Lithotomy position  Labia:         Right: No rash, tenderness, lesion or injury  Left: No rash, tenderness, lesion or injury  Vagina: No signs of injury and foreign body  Vaginal discharge present  No erythema, tenderness, bleeding, lesions or prolapsed vaginal walls  Cervix: Normal       Uterus: Absent  Lymphadenopathy:      Lower Body: No right inguinal adenopathy  No left inguinal adenopathy  Neurological:      Mental Status: She is alert and oriented to person, place, and time     Psychiatric:         Mood and Affect: Mood normal          Behavior: Behavior normal

## 2021-01-06 LAB
C TRACH DNA SPEC QL NAA+PROBE: NEGATIVE
N GONORRHOEA DNA SPEC QL NAA+PROBE: NEGATIVE

## 2021-01-25 ENCOUNTER — TELEPHONE (OUTPATIENT)
Dept: FAMILY MEDICINE CLINIC | Facility: CLINIC | Age: 36
End: 2021-01-25

## 2021-01-25 DIAGNOSIS — F41.9 ANXIETY: ICD-10-CM
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
## 2021-01-26 RX ORDER — ALPRAZOLAM 0.5 MG/1
0.5 TABLET ORAL
Qty: 10 TABLET | Refills: 0 | Status: SHIPPED | OUTPATIENT
Start: 2021-01-26 | End: 2021-09-13 | Stop reason: ALTCHOICE

## 2021-02-03 ENCOUNTER — CLINICAL SUPPORT (OUTPATIENT)
Dept: OBGYN CLINIC | Facility: CLINIC | Age: 36
End: 2021-02-03
Payer: COMMERCIAL

## 2021-02-03 DIAGNOSIS — Z23 NEED FOR HPV VACCINE: Primary | ICD-10-CM

## 2021-02-03 PROCEDURE — 90471 IMMUNIZATION ADMIN: CPT

## 2021-02-03 PROCEDURE — 90651 9VHPV VACCINE 2/3 DOSE IM: CPT

## 2021-02-18 ENCOUNTER — TRANSCRIBE ORDERS (OUTPATIENT)
Dept: LAB | Facility: CLINIC | Age: 36
End: 2021-02-18

## 2021-03-23 ENCOUNTER — ANNUAL EXAM (OUTPATIENT)
Dept: OBGYN CLINIC | Facility: CLINIC | Age: 36
End: 2021-03-23
Payer: COMMERCIAL

## 2021-03-23 VITALS
HEIGHT: 68 IN | DIASTOLIC BLOOD PRESSURE: 70 MMHG | BODY MASS INDEX: 26.89 KG/M2 | SYSTOLIC BLOOD PRESSURE: 106 MMHG | WEIGHT: 177.4 LBS

## 2021-03-23 DIAGNOSIS — N64.4 MASTODYNIA OF RIGHT BREAST: ICD-10-CM

## 2021-03-23 DIAGNOSIS — Z01.419 WELL WOMAN EXAM: Primary | ICD-10-CM

## 2021-03-23 PROCEDURE — 0503F POSTPARTUM CARE VISIT: CPT | Performed by: PHYSICIAN ASSISTANT

## 2021-03-23 PROCEDURE — 99395 PREV VISIT EST AGE 18-39: CPT | Performed by: PHYSICIAN ASSISTANT

## 2021-03-23 PROCEDURE — G0145 SCR C/V CYTO,THINLAYER,RESCR: HCPCS | Performed by: PHYSICIAN ASSISTANT

## 2021-03-23 PROCEDURE — 87624 HPV HI-RISK TYP POOLED RSLT: CPT | Performed by: PHYSICIAN ASSISTANT

## 2021-03-23 PROCEDURE — 3008F BODY MASS INDEX DOCD: CPT | Performed by: NURSE PRACTITIONER

## 2021-03-23 NOTE — PROGRESS NOTES
Assessment/Plan   Diagnoses and all orders for this visit:    Well woman exam    Mastodynia of right breast  -     Mammo diagnostic bilateral w 3d & cad; Future        Discussion    All questions have been answered to her satisfaction  RTO for APE or sooner if needed      Subjective     HPI   Pierce Buitrago is a 37 y o  female who presents for annual well woman exam    No periods since hysterectomy    No vulvar itch/burn; No vaginal itch/burn; No abn discharge or odor; No urinary sx - burning/pain/frequency/hematuria    No abd/pelvic pain or HAs;     Pt notes increased discomfort under her right breast  Has been off and on for a few years  She saw a breast specialist, had negative US  Pain resolved but now has been back more recently  No lumps that she is able to palpate but pt anxious about it  Pt is sexually active in a mutually monog/ sexual relationship x 1 year; No issues with intercourse; She declines std/hiv/hep testing; Feels safe at home        (+) PCP for routine Bw/care; Last Pap - 3/18/20 WNL   History of abnormal Pap smear: h/o ERNIE 2    Review of Systems   Constitutional: Negative  Respiratory: Negative  Gastrointestinal: Negative  Endocrine: Negative  Genitourinary: Negative          The following portions of the patient's history were reviewed and updated as appropriate: allergies, current medications, past family history, past medical history, past social history, past surgical history and problem list          OB History        0    Para   0    Term   0       0    AB   0    Living   0       SAB   0    TAB   0    Ectopic   0    Multiple   0    Live Births   0                 Past Medical History:   Diagnosis Date    Abnormal Pap smear of cervix 2018    [facility]    Anxiety     Asthma     Cervical lesion     Degeneration, intervertebral disc, lumbar     Depression     HPV (human papilloma virus) infection 2018    (+) type 16; negative type 18 and other HRHPV       Past Surgical History:   Procedure Laterality Date    CERVICAL BIOPSY  W/ LOOP ELECTRODE EXCISION  03/2018    [facility] [address]  [place] N/A 6/21/2019    Procedure: Meera May;  Surgeon: Josh Cyr MD;  [facility];  Service: Gynecology    LAPAROSCOPIC VAGINAL HYSTERECTOMY      ME CONIZATION CERVIX,LOOP ELECTRD N/A 3/23/2018    Procedure: BIOPSY LEEP CERVIX;  Surgeon: Rodo Balderas MD;  [facility];  Service: Gynecology    ME LAP,RMV  ADNEXAL STRUCTURE Bilateral 6/21/2019    Procedure: SALPINGECTOMY, LAPAROSCOPIC;  Surgeon: Josh Cyr MD;  [facility];  Service: Gynecology    ME LAP,VAG HYST,UTERUS 250GMS/<,SALP-OOPH N/A 6/21/2019    Procedure: HYSTERECTOMY LAPAROSCOPIC ASSISTED VAGINAL (LAVH); Surgeon: Josh Cyr MD;  [facility];  Service: Gynecology    ME TREAT TIBIAL SHAFT FX, INTRAMED IMPLANT Right 5/18/2020    Procedure: INSERTION NAIL IM TIBIA;  Surgeon: Woodrow Howe MD;  [facility];  Service: Orthopedics    RHINOPLASTY      SALPINGECTOMY      SHOULDER SURGERY Right     anchor inserted    TOOTH EXTRACTION      WOUND DEBRIDEMENT Right 5/18/2020    Procedure: DEBRIDEMENT LOWER EXTREMITY [facility]);   Surgeon: Woodrow Howe MD;  [facility];  Service: Orthopedics       Family History   Problem Relation Age of Onset    Cancer Mother     Melanoma Mother     Diabetes Father     Hypertension Father     Hyperlipidemia Brother     Ovarian cancer Paternal Grandmother     Cancer Paternal Uncle     Diabetes Family     Hypertension Family        Social History     Socioeconomic History    Marital status:      Spouse name: Not on file    Number of children: Not on file    Years of education: Not on file    Highest education level: Not on file   Occupational History    Not on file   Social Needs    Financial resource strain: Not on file    Food insecurity     Worry: Not on file Inability: Not on file    Transportation needs     Medical: Not on file     Non-medical: Not on file   Tobacco Use    Smoking status: Never Smoker    Smokeless tobacco: Never Used   Substance and Sexual Activity    Alcohol use: Yes     Frequency: 2-4 times a month     Drinks per session: 1 or 2     Binge frequency: Never    Drug use: Never    Sexual activity: Yes     Partners: Male     Birth control/protection: None     Comment: declines STD and HIV testing   Lifestyle    Physical activity     Days per week: Not on file     Minutes per session: Not on file    Stress: Not on file   Relationships    Social connections     Talks on phone: Not on file     Gets together: Not on file     Attends Church service: Not on file     Active member of club or organization: Not on file     Attends meetings of clubs or organizations: Not on file     Relationship status: Not on file    Intimate partner violence     Fear of current or ex partner: Not on file     Emotionally abused: Not on file     Physically abused: Not on file     Forced sexual activity: Not on file   Other Topics Concern    Not on file   Social History Narrative    ** Merged History Encounter **              Current Outpatient Medications:     acetaminophen (TYLENOL) 325 mg tablet, Take 3 tablets (975 mg total) by mouth every 8 (eight) hours, Disp: 30 tablet, Rfl: 0    albuterol (PROVENTIL HFA,VENTOLIN HFA) 90 mcg/act inhaler, Inhale 2 puffs every 6 (six) hours as needed for wheezing, Disp: , Rfl:     ALPRAZolam (XANAX) 0 5 mg tablet, Take 1 tablet (0 5 mg total) by mouth daily at bedtime as needed for anxiety, Disp: 10 tablet, Rfl: 0    fluticasone (FLONASE) 50 mcg/act nasal spray, 1 spray into each nostril daily as needed for allergies, Disp: 1 Bottle, Rfl: 5    Symbicort 160-4 5 MCG/ACT inhaler, Inhale 2 puffs 2 (two) times a day Rinse mouth after use , Disp: 3 Inhaler, Rfl: 1    FLUoxetine (PROzac) 20 mg capsule, Take 1 capsule (20 mg total) by mouth daily (Patient not taking: Reported on 3/23/2021), Disp: 30 capsule, Rfl: 5    Allergies   Allergen Reactions    Cat Hair Extract Allergic Rhinitis    Pollen Extract Allergic Rhinitis       Objective   Vitals:    03/23/21 1007   BP: 106/70   BP Location: Left arm   Patient Position: Sitting   Cuff Size: Standard   Weight: 80 5 kg (177 lb 6 4 oz)   Height: 5' 8" (1 727 m)     Physical Exam  Constitutional:       Appearance: She is well-developed  HENT:      Head: Normocephalic and atraumatic  Neck:      Comments: Diffuse thyromegaly-had normal US done 2018 no f/u advised  Cardiovascular:      Rate and Rhythm: Normal rate and regular rhythm  Pulmonary:      Effort: Pulmonary effort is normal       Breath sounds: Normal breath sounds  Chest:      Breasts: Breasts are symmetrical          Right: No inverted nipple, mass, nipple discharge, skin change or tenderness  Left: No inverted nipple, mass, nipple discharge, skin change or tenderness  Abdominal:      General: There is no distension  Palpations: Abdomen is soft  There is no mass  Tenderness: There is no guarding or rebound  Genitourinary:     General: Normal vulva  Exam position: Supine  Labia:         Right: No rash  Left: No rash  Vagina: No signs of injury and foreign body  No vaginal discharge or erythema  Uterus: Absent  Adnexa:         Right: No mass  Left: No mass  Skin:     General: Skin is warm and dry  Neurological:      Mental Status: She is alert and oriented to person, place, and time  Patient Instructions   Pap done due to h/o abnormal paps  Mammo ordered due to mastodynia  F/u 1 year earlier as needed

## 2021-03-25 LAB
HPV HR 12 DNA CVX QL NAA+PROBE: NEGATIVE
HPV16 DNA CVX QL NAA+PROBE: NEGATIVE
HPV18 DNA CVX QL NAA+PROBE: NEGATIVE

## 2021-03-27 LAB
LAB AP GYN PRIMARY INTERPRETATION: NORMAL
Lab: NORMAL
PATH INTERP SPEC-IMP: NORMAL

## 2021-03-29 DIAGNOSIS — B96.89 BV (BACTERIAL VAGINOSIS): Primary | ICD-10-CM

## 2021-03-29 DIAGNOSIS — N76.0 BV (BACTERIAL VAGINOSIS): Primary | ICD-10-CM

## 2021-03-29 RX ORDER — METRONIDAZOLE 500 MG/1
500 TABLET ORAL 2 TIMES DAILY
Qty: 14 TABLET | Refills: 0 | Status: SHIPPED | OUTPATIENT
Start: 2021-03-29 | End: 2021-04-05

## 2021-03-30 ENCOUNTER — HOSPITAL ENCOUNTER (OUTPATIENT)
Dept: MAMMOGRAPHY | Facility: CLINIC | Age: 36
Discharge: HOME | End: 2021-03-30
Payer: COMMERCIAL

## 2021-03-30 ENCOUNTER — HOSPITAL ENCOUNTER (OUTPATIENT)
Dept: ULTRASOUND IMAGING | Facility: CLINIC | Age: 36
Discharge: HOME | End: 2021-03-30
Payer: COMMERCIAL

## 2021-03-30 VITALS — BODY MASS INDEX: 26.83 KG/M2 | WEIGHT: 177 LBS | HEIGHT: 68 IN

## 2021-03-30 DIAGNOSIS — N64.4 MASTODYNIA: ICD-10-CM

## 2021-03-30 DIAGNOSIS — N64.4 MASTODYNIA OF RIGHT BREAST: ICD-10-CM

## 2021-03-30 PROCEDURE — 76642 ULTRASOUND BREAST LIMITED: CPT

## 2021-03-30 PROCEDURE — 77066 DX MAMMO INCL CAD BI: CPT

## 2021-03-30 PROCEDURE — G0279 TOMOSYNTHESIS, MAMMO: HCPCS

## 2021-04-28 ENCOUNTER — TRANSCRIBE ORDERS (OUTPATIENT)
Dept: ADMINISTRATIVE | Facility: HOSPITAL | Age: 36
End: 2021-04-28

## 2021-04-28 DIAGNOSIS — M54.50 LOW BACK PAIN, UNSPECIFIED BACK PAIN LATERALITY, UNSPECIFIED CHRONICITY, UNSPECIFIED WHETHER SCIATICA PRESENT: Primary | ICD-10-CM

## 2021-04-30 ENCOUNTER — TELEPHONE (OUTPATIENT)
Dept: OBGYN CLINIC | Facility: HOSPITAL | Age: 36
End: 2021-04-30

## 2021-05-10 ENCOUNTER — HOSPITAL ENCOUNTER (OUTPATIENT)
Dept: RADIOLOGY | Facility: IMAGING CENTER | Age: 36
Discharge: HOME | End: 2021-05-10
Payer: COMMERCIAL

## 2021-05-10 DIAGNOSIS — M54.50 LOW BACK PAIN, UNSPECIFIED BACK PAIN LATERALITY, UNSPECIFIED CHRONICITY, UNSPECIFIED WHETHER SCIATICA PRESENT: ICD-10-CM

## 2021-05-10 PROCEDURE — 72148 MRI LUMBAR SPINE W/O DYE: CPT

## 2021-05-12 ENCOUNTER — TELEPHONE (OUTPATIENT)
Dept: OBGYN CLINIC | Facility: HOSPITAL | Age: 36
End: 2021-05-12

## 2021-05-12 NOTE — TELEPHONE ENCOUNTER
[facility] from 2021 Raúl Willis  is calling to find out if the patient has an appt scheduled to review her mri  I let her know that I did not see one at this time  I also let her know that we did not order the mri

## 2021-05-18 ENCOUNTER — TELEPHONE (OUTPATIENT)
Dept: OBGYN CLINIC | Facility: HOSPITAL | Age: 36
End: 2021-05-18

## 2021-06-09 ENCOUNTER — TELEPHONE (OUTPATIENT)
Dept: OBGYN CLINIC | Facility: HOSPITAL | Age: 36
End: 2021-06-09

## 2021-06-09 NOTE — TELEPHONE ENCOUNTER
Viky Hanson from [address] [place] [address] is calling to see If patient had any upcoming appts scheduled with Dr Tammy Villaseñor  Advised no

## 2021-07-26 ENCOUNTER — TELEPHONE (OUTPATIENT)
Dept: OBGYN CLINIC | Facility: CLINIC | Age: 36
End: 2021-07-26

## 2021-07-26 DIAGNOSIS — B37.9 YEAST INFECTION: Primary | ICD-10-CM

## 2021-07-26 RX ORDER — FLUCONAZOLE 150 MG/1
150 TABLET ORAL EVERY OTHER DAY
Qty: 2 TABLET | Refills: 0 | Status: SHIPPED | OUTPATIENT
Start: 2021-07-26 | End: 2021-07-29

## 2021-07-26 NOTE — TELEPHONE ENCOUNTER
Reviewed with patient, sx consistent with yeast  Patient will try coconut oil and [facility]  Patient aware to call if sx do not subside

## 2021-09-13 ENCOUNTER — OFFICE VISIT (OUTPATIENT)
Dept: OBGYN CLINIC | Facility: CLINIC | Age: 36
End: 2021-09-13
Payer: COMMERCIAL

## 2021-09-13 ENCOUNTER — TELEPHONE (OUTPATIENT)
Dept: OBGYN CLINIC | Facility: CLINIC | Age: 36
End: 2021-09-13

## 2021-09-13 VITALS
HEIGHT: 68 IN | SYSTOLIC BLOOD PRESSURE: 116 MMHG | BODY MASS INDEX: 27.74 KG/M2 | WEIGHT: 183 LBS | DIASTOLIC BLOOD PRESSURE: 78 MMHG

## 2021-09-13 DIAGNOSIS — R10.2 PELVIC PRESSURE IN FEMALE: Primary | ICD-10-CM

## 2021-09-13 DIAGNOSIS — F41.9 ANXIETY: ICD-10-CM

## 2021-09-13 DIAGNOSIS — N94.10 DYSPAREUNIA IN FEMALE: ICD-10-CM

## 2021-09-13 LAB
SL AMB  POCT GLUCOSE, UA: NORMAL
SL AMB LEUKOCYTE ESTERASE,UA: NORMAL
SL AMB POCT BILIRUBIN,UA: NORMAL
SL AMB POCT BLOOD,UA: NORMAL
SL AMB POCT KETONES,UA: NORMAL
SL AMB POCT NITRITE,UA: NORMAL
SL AMB POCT PH,UA: 8.5
SL AMB POCT SPECIFIC GRAVITY,UA: 1.01
SL AMB POCT URINE PROTEIN: NORMAL
SL AMB POCT UROBILINOGEN: NORMAL

## 2021-09-13 PROCEDURE — 81002 URINALYSIS NONAUTO W/O SCOPE: CPT | Performed by: STUDENT IN AN ORGANIZED HEALTH CARE EDUCATION/TRAINING PROGRAM

## 2021-09-13 PROCEDURE — 99213 OFFICE O/P EST LOW 20 MIN: CPT | Performed by: STUDENT IN AN ORGANIZED HEALTH CARE EDUCATION/TRAINING PROGRAM

## 2021-09-13 NOTE — PROGRESS NOTES
Assessment/Plan:    No problem-specific Assessment & Plan notes found for this encounter  Diagnoses and all orders for this visit:    Pelvic pressure in female  Comments:  no sign of prolpase on exam, normal redundancy of vaginal tissue  ttp on levators b/l, recommend pelvic floor PT  right adnexal fullness and ttp, pelvic US  Orders:  -     POCT urine dip  -     US pelvis complete w transvaginal; Future    Dyspareunia in female  Comments:  ttp levators b/l  Orders:  -     Ambulatory referral to Physical Therapy; Future    Anxiety  Comments:  signigificant anxiety, largely situational over past couple of years, failed a lexapro and prozac, no medications, no SI/HI today  Orders:  -     Ambulatory referral to Psychiatry; Future          Subjective:      Patient ID: Anisha Dickinson is a 31 y o  female  HPI  Last week single episode of unintentional void without realizing  Some amounts of leakage of urine with coughing, laughing, physical activity  No urge incontinence  No dysuria, hematuria    Isolated episode of sharp, midline pelvic pain with intercourse  Read about prolapse and then looked in vagina and concerned about possible protrusion (late mother had cystocele that was repaired)    Week with lower pelvic cramping, constant, no clear alleviating or aggravating factors    +constipation, chronic, usually BM 1/week, hasnt taken anything recently      Review of Systems   Constitutional: Negative for chills, fever and unexpected weight change  HENT: Negative for congestion and sore throat  Eyes: Negative  Negative for visual disturbance  Respiratory: Negative for shortness of breath  Cardiovascular: Negative  Negative for chest pain and palpitations  Gastrointestinal: Positive for abdominal pain and constipation  Negative for abdominal distention, diarrhea, nausea and vomiting  Genitourinary: Positive for pelvic pain   Negative for hematuria, menstrual problem, urgency, vaginal bleeding and vaginal discharge  Musculoskeletal: Negative  Skin: Negative  Allergic/Immunologic: Negative  Neurological: Negative  Hematological: Does not bruise/bleed easily  Objective:  /78 (BP Location: Left arm, Patient Position: Sitting, Cuff Size: Standard)   Ht 5' 8" (1 727 m)   Wt 83 kg (183 lb)   LMP 05/01/2019   BMI 27 83 kg/m²          Physical Exam  Constitutional:       Appearance: Normal appearance  HENT:      Head: Atraumatic  Nose: Nose normal  No congestion  Eyes:      Extraocular Movements: Extraocular movements intact  Cardiovascular:      Pulses: Normal pulses  Pulmonary:      Effort: Pulmonary effort is normal    Abdominal:      General: Abdomen is flat  There is no distension  Palpations: Abdomen is soft  There is no mass  Tenderness: There is no abdominal tenderness  There is no guarding  Genitourinary:     General: Normal vulva  Comments: Vagina: normal, no ttp, no lesions, no erythema  Bladder: normal, no ttp, no lesions  Cervix: surgically absent  Uterus: surgically absent  Adnexa: no masses, right-sided fullness and mild ttp  Split speculum exam without prolapse  Cough stress test negative    Musculoskeletal:         General: Normal range of motion  Cervical back: Normal range of motion  Skin:     General: Skin is warm and dry  Neurological:      General: No focal deficit present  Mental Status: She is alert  Psychiatric:         Behavior: Behavior normal          Thought Content:  Thought content normal

## 2021-09-13 NOTE — PATIENT INSTRUCTIONS
Pelvic Pain in Women     AMBULATORY CARE:   Pelvic pain  may happen on one or both sides of your pelvis  Pelvic pain may occur with certain body positions or activities, such as when you have sex or a bowel movement  It may worsen during your monthly period or after you sit or stand for a long time  Chronic pelvic pain is pain that continues for longer than 6 months  Call 911 for any of the following:   · You have severe chest pain and sudden trouble breathing  Seek care immediately if:   · You have heavy or unusual vaginal bleeding, and you feel lightheaded or faint  Contact your healthcare provider if:   · You have pelvic pain that does not go away after you take pain medicine  · You develop new symptoms or your symptoms are worse than before  · You have questions or concerns about your condition or care  Medicines: You may need any of the following:  · Pain medicine  may be given in pills or creams to relieve your pain  · Hormones  may be given if your pain gets worse with your menstrual cycle  · Antibiotics  may be given if your pain is caused by infection  · Take your medicine as directed  Contact your healthcare provider if you think your medicine is not helping or if you have side effects  Tell him or her if you are allergic to any medicine  Keep a list of the medicines, vitamins, and herbs you take  Include the amounts, and when and why you take them  Bring the list or the pill bottles to follow-up visits  Carry your medicine list with you in case of an emergency  Self-care:   · Keep a pain diary  Write down when your pain happens, how severe it is, and any other symptoms you have with your pain  A diary will help you keep track of pain cycles  It may also help your healthcare provider find out what is causing your pain  · Learn ways to relax  Deep breathing, meditation, and relaxation techniques can help decrease your pain   When you are tense, your pain may increase  · Change the foods you eat if you have irritable bowel syndrome  Ask your healthcare provider about the best foods for you  Follow up with your healthcare provider as directed:  Write down your questions so you remember to ask them during your visits  © Copyright [employer] 2021 Information is for End User's use only and may not be sold, redistributed or otherwise used for commercial purposes  All illustrations and images included in CareNotes® are the copyrighted property of [employer]  or Kelli Willis  The above information is an  only  It is not intended as medical advice for individual conditions or treatments  Talk to your doctor, nurse or pharmacist before following any medical regimen to see if it is safe and effective for you

## 2021-09-13 NOTE — TELEPHONE ENCOUNTER
Pt called noticed about a week ago increase urination and on Saturday (9/11/21) after intercourse she experienced lower abdomen shooting pains  Since 9/11/21 she is still experiencing increased urination but also does not believe she can empty her bladder completely  She did notice a bugle in her vaginal area  Pt had a hysterectomy 06/2019  Schedule apt today with Dr Faiza Box  Pt states her mom had mesh placed for the same problem  Review how Dr Faiza Box will review options with her at her apt, possible physical therapy, pessary or a surgical option

## 2021-09-14 ENCOUNTER — HOSPITAL ENCOUNTER (OUTPATIENT)
Dept: RADIOLOGY | Age: 36
Discharge: HOME | End: 2021-09-14
Payer: COMMERCIAL

## 2021-09-14 DIAGNOSIS — R10.2 PELVIC PRESSURE IN FEMALE: ICD-10-CM

## 2021-09-14 PROCEDURE — 76856 US EXAM PELVIC COMPLETE: CPT

## 2021-09-14 PROCEDURE — 76830 TRANSVAGINAL US NON-OB: CPT

## 2021-11-26 DIAGNOSIS — J45.40 MODERATE PERSISTENT ASTHMA WITHOUT COMPLICATION: ICD-10-CM

## 2021-11-26 RX ORDER — BUDESONIDE AND FORMOTEROL FUMARATE DIHYDRATE 160; 4.5 UG/1; UG/1
2 AEROSOL RESPIRATORY (INHALATION) 2 TIMES DAILY
Qty: 10.2 G | Refills: 5 | Status: SHIPPED | OUTPATIENT
Start: 2021-11-26 | End: 2022-01-14 | Stop reason: SDUPTHER

## 2021-12-29 DIAGNOSIS — S82.301B TYPE I OR II OPEN FRACTURE OF DISTAL END OF RIGHT TIBIA, UNSPECIFIED FRACTURE MORPHOLOGY, INITIAL ENCOUNTER: Primary | ICD-10-CM

## 2021-12-30 ENCOUNTER — OFFICE VISIT (OUTPATIENT)
Dept: OBGYN CLINIC | Facility: HOSPITAL | Age: 36
End: 2021-12-30
Payer: COMMERCIAL

## 2021-12-30 ENCOUNTER — HOSPITAL ENCOUNTER (OUTPATIENT)
Dept: RADIOLOGY | Facility: HOSPITAL | Age: 36
Discharge: HOME | End: 2021-12-30
Attending: ORTHOPAEDIC SURGERY
Payer: COMMERCIAL

## 2021-12-30 VITALS
DIASTOLIC BLOOD PRESSURE: 65 MMHG | HEART RATE: 75 BPM | HEIGHT: 68 IN | SYSTOLIC BLOOD PRESSURE: 106 MMHG | WEIGHT: 186 LBS | BODY MASS INDEX: 28.19 KG/M2

## 2021-12-30 DIAGNOSIS — M70.61 GREATER TROCHANTERIC BURSITIS OF RIGHT HIP: ICD-10-CM

## 2021-12-30 DIAGNOSIS — Z98.890 STATUS POST OPEN REDUCTION AND INTERNAL FIXATION (ORIF) OF FRACTURE: Primary | ICD-10-CM

## 2021-12-30 DIAGNOSIS — T84.84XA PAINFUL ORTHOPAEDIC HARDWARE (HCC): ICD-10-CM

## 2021-12-30 DIAGNOSIS — Z87.81 STATUS POST OPEN REDUCTION AND INTERNAL FIXATION (ORIF) OF FRACTURE: Primary | ICD-10-CM

## 2021-12-30 DIAGNOSIS — S82.301B TYPE I OR II OPEN FRACTURE OF DISTAL END OF RIGHT TIBIA, UNSPECIFIED FRACTURE MORPHOLOGY, INITIAL ENCOUNTER: ICD-10-CM

## 2021-12-30 PROCEDURE — 99214 OFFICE O/P EST MOD 30 MIN: CPT | Performed by: ORTHOPAEDIC SURGERY

## 2021-12-30 PROCEDURE — 73590 X-RAY EXAM OF LOWER LEG: CPT

## 2021-12-30 RX ORDER — CHLORHEXIDINE GLUCONATE 0.12 MG/ML
15 RINSE ORAL ONCE
Status: CANCELLED | OUTPATIENT
Start: 2021-12-30 | End: 2021-12-30

## 2021-12-30 RX ORDER — CEFAZOLIN SODIUM 2 G/50ML
2000 SOLUTION INTRAVENOUS ONCE
Status: CANCELLED | OUTPATIENT
Start: 2021-12-30 | End: 2021-12-30

## 2021-12-30 NOTE — PROGRESS NOTES
Orthopaedics Office Visit - New Patient Visit    ASSESSMENT/PLAN:    Assessment:   Status post ORIF right tibia shaft fracture  Painful orthopedic hardware - will plan to remove two distal bolts  Right greater trochanter bursitis    Plan:   -X-ray's were reviewed with the patient at today's visit  -Discussed with the patient that we can order a CT scan to further evaluate the healing process before proceeding with removal of hardware  -CT scan ordered at today's visit  -Risks of the surgery are inclusive of but not limited to bleeding, infection, nerve injury, blood clot, worsening of symptoms, not achieving the anticipated results, persistent stiffness, weakness and the need for additional surgery  The patient verbally stated they understood those risks and would like to proceed with the surgery  Surgical consent was signed in the office today     -Consider corticosteroid injection of greater trochanter bursa - she prefers this occur at postop visit    To Do Next Visit:  Postop appointment    _____________________________________________________  CHIEF COMPLAINT:  Chief Complaint   Patient presents with    Right Leg - Pain         SUBJECTIVE:  Gleda Sever is a 31 y o  female who presents to the office today for initial evaluation of her right leg  She states that on 05/18/2020 while working at [employer], a carton of chalk fell on her  She states that 100 boxes fell on her and each box weighs 20 pounds  She was seen in the ED immediately after the injury occurred for an open right tibia shaft fracture she underwent surgery performed by Dr Gaurav Olguin, and roc and screws were placed  She states that she attended formal physical therapy but plateaued with her improvement  She states that she was never pain free after the surgery  She notes that she will experience stiffness into her ankle and discomfort where the screws are located    She states that she will experience daily intermittent in dull aching pain which is exacerbated with prolonged periods of standing and walking  She states that due to the pain she experiences she compensate when she walks and is now spirits and pain into her right hip  She states that she has not used any over-the-counter anti-inflammatories for pain relief  She states that she will ice her ankle on occasions  PAST MEDICAL HISTORY:  Past Medical History:   Diagnosis Date    Abnormal Pap smear of cervix 02/2018    ASC-[facility]    Anxiety     Asthma     Cervical lesion     Degeneration, intervertebral disc, lumbar     Depression     HPV (human papilloma virus) infection 02/2018    (+) type 16; negative type 18 and other HRHPV       PAST SURGICAL HISTORY:  Past Surgical History:   Procedure Laterality Date    CERVICAL BIOPSY  W/ LOOP ELECTRODE EXCISION  03/2018    [facility] Dr Yanet Casey N/A 6/21/2019    Procedure: Javon Aviles;  Surgeon: Maranda Dakin, MD;  [facility];  Service: Gynecology    LAPAROSCOPIC VAGINAL HYSTERECTOMY      IA CONIZATION CERVIX,LOOP ELECTRD N/A 3/23/2018    Procedure: BIOPSY LEEP CERVIX;  Surgeon: Aura Carmichael MD;  [facility];  Service: Gynecology    IA LAP,RMV  ADNEXAL STRUCTURE Bilateral 6/21/2019    Procedure: SALPINGECTOMY, LAPAROSCOPIC;  Surgeon: Maranda Dakin, MD;  [facility];  Service: Gynecology    IA LAP,VAG HYST,UTERUS 250GMS/<,SALP-OOPH N/A 6/21/2019    Procedure: HYSTERECTOMY LAPAROSCOPIC ASSISTED VAGINAL (LAVH); Surgeon: Maranda Dakin, MD;  [facility];  Service: Gynecology    IA TREAT TIBIAL SHAFT FX, INTRAMED IMPLANT Right 5/18/2020    Procedure: INSERTION NAIL IM TIBIA;  Surgeon: Paul Guardado MD;  [facility];  Service: Orthopedics    RHINOPLASTY      SALPINGECTOMY      SHOULDER SURGERY Right     anchor inserted    TOOTH EXTRACTION      WOUND DEBRIDEMENT Right 5/18/2020    Procedure: DEBRIDEMENT LOWER EXTREMITY [facility]);   Surgeon: Paul Guardado MD;  [facility];  Service: Orthopedics       FAMILY HISTORY:  Family History   Problem Relation Age of Onset    Cancer Mother     Melanoma Mother     Diabetes Father     Hypertension Father     Hyperlipidemia Brother     Ovarian cancer Paternal Grandmother     Cancer Paternal Uncle     Diabetes Family     Hypertension Family        SOCIAL HISTORY:  Social History     Tobacco Use    Smoking status: Never Smoker    Smokeless tobacco: Never Used   Vaping Use    Vaping Use: Never used   Substance Use Topics    Alcohol use: Yes     Comment: social    Drug use: Never       MEDICATIONS:    Current Outpatient Medications:     acetaminophen (TYLENOL) 325 mg tablet, Take 3 tablets (975 mg total) by mouth every 8 (eight) hours, Disp: 30 tablet, Rfl: 0    albuterol (PROVENTIL HFA,VENTOLIN HFA) 90 mcg/act inhaler, Inhale 2 puffs every 6 (six) hours as needed for wheezing, Disp: , Rfl:     fluticasone (FLONASE) 50 mcg/act nasal spray, 1 spray into each nostril daily as needed for allergies, Disp: 1 Bottle, Rfl: 5    Symbicort 160-4 5 MCG/ACT inhaler, Inhale 2 puffs 2 (two) times a day Rinse mouth after use , Disp: 10 2 g, Rfl: 5    ALLERGIES:  Allergies   Allergen Reactions    Cat Hair Extract Allergic Rhinitis    Pollen Extract Allergic Rhinitis       REVIEW OF SYSTEMS:  MSK: Right Lower Extremity  Neuro: Intact  Pertinent items are otherwise noted in HPI  A comprehensive review of systems was otherwise negative      LABS:  HgA1c:   Lab Results   Component Value Date    HGBA1C 5 1 06/14/2019     BMP:   Lab Results   Component Value Date    GLUCOSE 92 05/18/2020    CALCIUM 9 4 08/28/2020     03/03/2015    K 4 3 08/28/2020    CO2 23 08/28/2020     08/28/2020    BUN 9 08/28/2020    CREATININE 0 70 08/28/2020     CBC: No components found for: CBC    _____________________________________________________  PHYSICAL EXAMINATION:  Vital signs: /65   Pulse 75   Ht 5' 8" (1 727 m)   Wt 84 4 kg (186 lb)   LMP 05/01/2019   BMI 28 28 kg/m²   General: No acute distress, awake and alert  Psychiatric: Mood and affect appear appropriate  HEENT: Trachea Midline, No torticollis, no apparent facial trauma  Cardiovascular: No audible murmurs; Extremities appear perfused  Pulmonary: No audible wheezing or stridor  Skin: No open lesions; see further details (if any) below    MUSCULOSKELETAL EXAMINATION:  Extremities:  Right Lower Extremity  Well healed scars  Full range of motion of hip, knee and ankle  TTP greater trochanter   TTP medial ankle  Neurovascularly intact    _____________________________________________________  STUDIES REVIEWED:  I personally reviewed the images and interpretation is as follows:  X-rays performed in the office today of her right tib-fib demonstrates a healed tibial midshaft fracture with stable orthopedic hardware        PROCEDURES PERFORMED:  Procedures    Scribe Attestation    I,:  Fransico Romeo am acting as a scribe while in the presence of the attending physician :       I,:  Sheela Dietrich MD personally performed the services described in this documentation    as scribed in my presence :

## 2022-01-01 PROBLEM — Z98.890 STATUS POST OPEN REDUCTION AND INTERNAL FIXATION (ORIF) OF FRACTURE: Status: ACTIVE | Noted: 2022-01-01

## 2022-01-01 PROBLEM — Z87.81 STATUS POST OPEN REDUCTION AND INTERNAL FIXATION (ORIF) OF FRACTURE: Status: ACTIVE | Noted: 2022-01-01

## 2022-01-03 ENCOUNTER — APPOINTMENT (OUTPATIENT)
Dept: LAB | Facility: CLINIC | Age: 37
End: 2022-01-03
Payer: COMMERCIAL

## 2022-01-03 DIAGNOSIS — Z87.81 STATUS POST OPEN REDUCTION AND INTERNAL FIXATION (ORIF) OF FRACTURE: ICD-10-CM

## 2022-01-03 DIAGNOSIS — Z98.890 STATUS POST OPEN REDUCTION AND INTERNAL FIXATION (ORIF) OF FRACTURE: ICD-10-CM

## 2022-01-03 LAB
ANION GAP SERPL CALCULATED.3IONS-SCNC: 5 MMOL/L (ref 4–13)
BASOPHILS # BLD AUTO: 0.04 THOUSANDS/ΜL (ref 0–0.1)
BASOPHILS NFR BLD AUTO: 0 % (ref 0–1)
BUN SERPL-MCNC: 10 MG/DL (ref 5–25)
CALCIUM SERPL-MCNC: 9.4 MG/DL (ref 8.3–10.1)
CHLORIDE SERPL-SCNC: 104 MMOL/L (ref 100–108)
CO2 SERPL-SCNC: 28 MMOL/L (ref 21–32)
CREAT SERPL-MCNC: 0.68 MG/DL (ref 0.6–1.3)
EOSINOPHIL # BLD AUTO: 0.09 THOUSAND/ΜL (ref 0–0.61)
EOSINOPHIL NFR BLD AUTO: 1 % (ref 0–6)
ERYTHROCYTE [DISTWIDTH] IN BLOOD BY AUTOMATED COUNT: 12.6 % (ref 11.6–15.1)
GFR SERPL CREATININE-BSD FRML MDRD: 114 ML/MIN/1.73SQ M
GLUCOSE P FAST SERPL-MCNC: 79 MG/DL (ref 65–99)
HCT VFR BLD AUTO: 44.1 % (ref 34.8–46.1)
HGB BLD-MCNC: 14.1 G/DL (ref 11.5–15.4)
IMM GRANULOCYTES # BLD AUTO: 0.05 THOUSAND/UL (ref 0–0.2)
IMM GRANULOCYTES NFR BLD AUTO: 1 % (ref 0–2)
LYMPHOCYTES # BLD AUTO: 1.99 THOUSANDS/ΜL (ref 0.6–4.47)
LYMPHOCYTES NFR BLD AUTO: 21 % (ref 14–44)
MCH RBC QN AUTO: 29.3 PG (ref 26.8–34.3)
MCHC RBC AUTO-ENTMCNC: 32 G/DL (ref 31.4–37.4)
MCV RBC AUTO: 92 FL (ref 82–98)
MONOCYTES # BLD AUTO: 0.72 THOUSAND/ΜL (ref 0.17–1.22)
MONOCYTES NFR BLD AUTO: 8 % (ref 4–12)
NEUTROPHILS # BLD AUTO: 6.39 THOUSANDS/ΜL (ref 1.85–7.62)
NEUTS SEG NFR BLD AUTO: 69 % (ref 43–75)
NRBC BLD AUTO-RTO: 0 /100 WBCS
PLATELET # BLD AUTO: 302 THOUSANDS/UL (ref 149–390)
PMV BLD AUTO: 9.8 FL (ref 8.9–12.7)
POTASSIUM SERPL-SCNC: 3.8 MMOL/L (ref 3.5–5.3)
RBC # BLD AUTO: 4.81 MILLION/UL (ref 3.81–5.12)
SODIUM SERPL-SCNC: 137 MMOL/L (ref 136–145)
WBC # BLD AUTO: 9.28 THOUSAND/UL (ref 4.31–10.16)

## 2022-01-03 PROCEDURE — 85025 COMPLETE CBC W/AUTO DIFF WBC: CPT

## 2022-01-03 PROCEDURE — 80048 BASIC METABOLIC PNL TOTAL CA: CPT

## 2022-01-03 PROCEDURE — 36415 COLL VENOUS BLD VENIPUNCTURE: CPT

## 2022-01-07 ENCOUNTER — TELEMEDICINE (OUTPATIENT)
Dept: FAMILY MEDICINE CLINIC | Facility: CLINIC | Age: 37
End: 2022-01-07
Payer: COMMERCIAL

## 2022-01-07 DIAGNOSIS — B34.9 VIRAL INFECTION, UNSPECIFIED: Primary | ICD-10-CM

## 2022-01-07 DIAGNOSIS — J01.10 ACUTE NON-RECURRENT FRONTAL SINUSITIS: ICD-10-CM

## 2022-01-07 PROBLEM — D64.9 ANEMIA: Status: RESOLVED | Noted: 2020-05-20 | Resolved: 2022-01-07

## 2022-01-07 PROCEDURE — G2012 BRIEF CHECK IN BY MD/QHP: HCPCS | Performed by: PHYSICIAN ASSISTANT

## 2022-01-07 PROCEDURE — U0005 INFEC AGEN DETEC AMPLI PROBE: HCPCS | Performed by: PHYSICIAN ASSISTANT

## 2022-01-07 PROCEDURE — U0003 INFECTIOUS AGENT DETECTION BY NUCLEIC ACID (DNA OR RNA); SEVERE ACUTE RESPIRATORY SYNDROME CORONAVIRUS 2 (SARS-COV-2) (CORONAVIRUS DISEASE [COVID-19]), AMPLIFIED PROBE TECHNIQUE, MAKING USE OF HIGH THROUGHPUT TECHNOLOGIES AS DESCRIBED BY CMS-2020-01-R: HCPCS | Performed by: PHYSICIAN ASSISTANT

## 2022-01-07 NOTE — PROGRESS NOTES
COVID-19 Outpatient Progress Note    Assessment/Plan:    Problem List Items Addressed This Visit     None      Visit Diagnoses     Viral infection, unspecified    -  Primary    Relevant Orders    COVID Only - Office Collect    Acute non-recurrent frontal sinusitis             Disposition:     Recommended patient to come to the office to test for COVID-19  I recommended self-quarantine for 10 days and to watch for symptoms until 14 days after exposure  If patient were to develop symptoms, they should self isolate and call our office for further guidance  Discussed likely viral etiology vs bacterial  Testing for COVID  Will consider ABx if COVID is negative and symptoms persist     Pt may continue OTC options and will provide sample of NeilMed saline nasal rise to try for symptoms relief  I have spent 13 minutes directly with the patient  Greater than 50% of this time was spent in counseling/coordination of care regarding: prognosis, patient and family education and importance of treatment compliance  Encounter provider Ronita Severs, PA-C    Provider located at [address]  [phone]    Recent Visits  No visits were found meeting these conditions  Showing recent visits within past 7 days and meeting all other requirements  Today's Visits  Date Type Provider Dept   01/07/22 Telemedicine Ronita Severs, PA-C [facility]   Showing today's visits and meeting all other requirements  Future Appointments  No visits were found meeting these conditions  Showing future appointments within next 150 days and meeting all other requirements     This virtual check-in was done via telephone and she agrees to proceed  Patient agrees to participate in a virtual check in via telephone or video visit instead of presenting to the office to address urgent/immediate medical needs  Patient is aware this is a billable service      After connecting through Telephone, the patient was identified by name and date of birth  Gonzalo Wilhelm was informed that this was a telemedicine visit and that the exam was being conducted confidentially over secure lines  My office door was closed  No one else was in the room  Gonzalo Wilhelm acknowledged consent and understanding of privacy and security of the telemedicine visit  I informed the patient that I have reviewed her record in Epic and presented the opportunity for her to ask any questions regarding the visit today  The patient agreed to participate  It was my intent to perform this visit via video technology but the patient was not able to do a video connection so the visit was completed via audio telephone only  Verification of patient location:  Patient is located in the following state in which I hold an active license: [place]    Subjective:   Gonzalo Wilhelm is a 31 y o  female who is concerned about COVID-19  Patient's symptoms include nasal congestion, rhinorrhea, cough (occasional) and headache  Patient denies fever, chills, sore throat, anosmia, loss of taste, shortness of breath, chest tightness, nausea, diarrhea and myalgias       Date of symptom onset: 1/4/2022  COVID-19 vaccination status: Not vaccinated    Exposure:   Contact with a person who is under investigation (PUI) for or who is positive for COVID-19 within the last 14 days?: No    Hospitalized recently for fever and/or lower respiratory symptoms?: No      Currently a healthcare worker that is involved in direct patient care?: No      Works in a special setting where the risk of COVID-19 transmission may be high? (this may include long-term care, correctional and [facility] facilities; homeless shelters; assisted-living facilities and group homes ): No      Resident in a special setting where the risk of COVID-19 transmission may be high? (this may include long-term care, correctional and [facility] facilities; homeless shelters; assisted-living facilities and group homes ): No      Work -   Boyfriend has no symptoms    Using liquid Mucinex at night which helps runny nose  Sudafed helped congestion  Lab Results   Component Value Date    SARSCOV2 Negative 05/18/2020     Past Medical History:   Diagnosis Date    Abnormal Pap smear of cervix 02/2018    [facility]    Anxiety     Asthma     Cervical lesion     Degeneration, intervertebral disc, lumbar     Depression     HPV (human papilloma virus) infection 02/2018    (+) type 16; negative type 18 and other HRHPV     Past Surgical History:   Procedure Laterality Date    CERVICAL BIOPSY  W/ LOOP [facility]LECTRODE EXCISION  [facility] Lede[facility] N/A 6/21/2019    Procedure: Sandra Vizcarrah;  Surgeon: Jhoan Sanz MD;  [facility];  Service: Gynecology    LAPAROSCOPIC VAGINAL HYSTERECTOMY      SD CONIZATION CERVIX,LOOP ELECTRD N/A 3/23/2018    Procedure: BIOPSY LEEP CERVIX;  Surgeon: Rashmi Qureshi MD;  [facility];  Service: Gynecology    SD LAP,RMV  ADNEXAL STRUCTURE Bilateral 6/21/2019    Procedure: SALPINGECTOMY, LAPAROSCOPIC;  Surgeon: Jhoan Sanz MD;  [facility];  Service: Gynecology    SD LAP,VAG HYST,UTERUS 250GMS/<,SALP-OOPH N/A 6/21/2019    Procedure: HYSTERECTOMY LAPAROSCOPIC ASSISTED VAGINAL (LAVH); Surgeon: Jhoan Sanz MD;  [facility];  Service: Gynecology    SD TREAT TIBIAL SHAFT FX, INTRAMED IMPLANT Right 5/18/2020    Procedure: INSERTION NAIL IM TIBIA;  Surgeon: Perfecto Berrios MD;  [facility];  Service: Orthopedics    RHINOPLASTY      SALPINGECTOMY      SHOULDER SURGERY Right     anchor inserted    TOOTH EXTRACTION      WOUND DEBRIDEMENT Right 5/18/2020    Procedure: DEBRIDEMENT LOWER EXTREMITY [facility]);   Surgeon: Perfecto Berrios MD;  [facility];  Service: Orthopedics     Current Outpatient Medications   Medication Sig Dispense Refill    acetaminophen (TYLENOL) 325 mg tablet Take 3 tablets (975 mg total) by mouth every 8 (eight) hours 30 tablet 0    albuterol (PROVENTIL HFA,VENTOLIN HFA) 90 mcg/act inhaler Inhale 2 puffs every 6 (six) hours as needed for wheezing      fluticasone (FLONASE) 50 mcg/act nasal spray 1 spray into each nostril daily as needed for allergies 1 Bottle 5    Symbicort 160-4 5 MCG/ACT inhaler Inhale 2 puffs 2 (two) times a day Rinse mouth after use  10 2 g 5     No current facility-administered medications for this visit  Allergies   Allergen Reactions    Cat Hair Extract Allergic Rhinitis    Pollen Extract Allergic Rhinitis       Review of Systems   Constitutional: Negative for chills and fever  HENT: Positive for congestion, postnasal drip, rhinorrhea and sinus pressure (left)  Negative for sore throat  Left gland swollen   Respiratory: Positive for cough (occasional)  Negative for chest tightness and shortness of breath  Gastrointestinal: Negative for diarrhea and nausea  Musculoskeletal: Negative for myalgias  Neurological: Positive for headaches  Objective: There were no vitals filed for this visit  Physical Exam    VIRTUAL VISIT [address] [place] [address] verbally agrees to participate in [employer]  Pt is aware that [employer] could be limited without vital signs or the ability to perform a full hands-on physical  Scales understands she or the provider may request at any time to terminate the video visit and request the patient to seek care or treatment in person

## 2022-01-09 LAB — SARS-COV-2 RNA RESP QL NAA+PROBE: POSITIVE

## 2022-01-13 ENCOUNTER — ANESTHESIA EVENT (OUTPATIENT)
Dept: PERIOP | Facility: HOSPITAL | Age: 37
End: 2022-01-13
Payer: COMMERCIAL

## 2022-01-14 ENCOUNTER — OFFICE VISIT (OUTPATIENT)
Dept: FAMILY MEDICINE CLINIC | Facility: CLINIC | Age: 37
End: 2022-01-14
Payer: COMMERCIAL

## 2022-01-14 VITALS
OXYGEN SATURATION: 95 % | HEIGHT: 68 IN | SYSTOLIC BLOOD PRESSURE: 102 MMHG | BODY MASS INDEX: 28.04 KG/M2 | HEART RATE: 80 BPM | WEIGHT: 185 LBS | RESPIRATION RATE: 18 BRPM | DIASTOLIC BLOOD PRESSURE: 72 MMHG | TEMPERATURE: 95.8 F

## 2022-01-14 DIAGNOSIS — Z98.890 STATUS POST OPEN REDUCTION AND INTERNAL FIXATION (ORIF) OF FRACTURE: ICD-10-CM

## 2022-01-14 DIAGNOSIS — Z87.81 STATUS POST OPEN REDUCTION AND INTERNAL FIXATION (ORIF) OF FRACTURE: ICD-10-CM

## 2022-01-14 DIAGNOSIS — Z01.818 PRE-OPERATIVE CLEARANCE: Primary | ICD-10-CM

## 2022-01-14 DIAGNOSIS — J45.40 MODERATE PERSISTENT ASTHMA WITHOUT COMPLICATION: ICD-10-CM

## 2022-01-14 PROCEDURE — 93000 ELECTROCARDIOGRAM COMPLETE: CPT | Performed by: PHYSICIAN ASSISTANT

## 2022-01-14 PROCEDURE — 99242 OFF/OP CONSLTJ NEW/EST SF 20: CPT | Performed by: PHYSICIAN ASSISTANT

## 2022-01-14 RX ORDER — BUDESONIDE AND FORMOTEROL FUMARATE DIHYDRATE 160; 4.5 UG/1; UG/1
2 AEROSOL RESPIRATORY (INHALATION) 2 TIMES DAILY
Qty: 10.2 G | Refills: 5 | Status: SHIPPED | OUTPATIENT
Start: 2022-01-14

## 2022-01-14 RX ORDER — ALBUTEROL SULFATE 90 UG/1
2 AEROSOL, METERED RESPIRATORY (INHALATION) EVERY 6 HOURS PRN
Qty: 18 G | Refills: 5 | Status: SHIPPED | OUTPATIENT
Start: 2022-01-14

## 2022-01-14 NOTE — PROGRESS NOTES
PRE-OPERATIVE EVALUATION  [facility]    NAME: Ayush Alfonso  AGE: 31 y o  SEX: female  : 1985     DATE: 2022     Pre-Operative Evaluation      Chief Complaint: Pre-operative Evaluation     Surgery: removal of surgical hardware, right livia  Anticipated Date of Surgery:   Referring Provider: Dr Rosalia Lees      History of Present Illness:     Ayush Alfonso is a 31 y o  female who presents to the office today for a preoperative consultation at the request of surgeon  Planned anesthesia is general  Patient has a bleeding risk of: no recent abnormal bleeding and no remote history of abnormal bleeding  Patient does not have objections to receiving blood products if needed  Current anti-platelet/anti-coagulation medications that the patient is prescribed includes: none  Assessment of Chronic Conditions:   - Asthma: well managed with Sybmicort 2 puffs AM     Assessment of Cardiac Risk:  · Denies unstable or severe angina or MI in the last 6 weeks or history of stent placement in the last year   · Denies decompensated heart failure (e g  New onset heart failure, NYHA functional class IV heart failure, or worsening existing heart failure)  · Denies significant arrhythmias such as high grade AV block, symptomatic ventricular arrhythmia, newly recognized ventricular tachycardia, supraventricular tachycardia with resting heart rate >100, or symptomatic bradycardia  · Denies severe heart valve disease including aortic stenosis or symptomatic mitral stenosis     Exercise Capacity:  · Able to walk 4 blocks without symptoms?: Yes  · Able to walk 2 flights without symptoms?: Yes    Prior Anesthesia Reactions: No     Personal history of venous thromboembolic disease? No      Review of Systems:     Review of Systems   Constitutional: Negative for fatigue, fever and unexpected weight change  HENT: Negative for rhinorrhea and sore throat      Respiratory: Negative for cough, shortness of breath and wheezing  Cardiovascular: Negative for chest pain, palpitations and leg swelling  Gastrointestinal: Negative for constipation, diarrhea and nausea  Musculoskeletal: Negative for arthralgias and myalgias  Right ankle pain       Current Problem List:     Patient Active Problem List   Diagnosis    Open fracture of right tibia and fibula    Allergic rhinitis    DDD (degenerative disc disease), cervical    Disc degeneration, lumbar    Moderate dysplasia of cervix (ERNIE II)    S/P LEEP of cervix    Cervical dysplasia    Moderate persistent asthma without complication    Diffuse nontoxic goiter    Panic disorder without agoraphobia    PTSD (post-traumatic stress disorder)    Status post open reduction and internal fixation (ORIF) of fracture       Allergies:      Allergies   Allergen Reactions    Cat Hair Extract Allergic Rhinitis    Pollen Extract Allergic Rhinitis       Current Medications:       Current Outpatient Medications:     acetaminophen (TYLENOL) 325 mg tablet, Take 3 tablets (975 mg total) by mouth every 8 (eight) hours, Disp: 30 tablet, Rfl: 0    albuterol (PROVENTIL HFA,VENTOLIN HFA) 90 mcg/act inhaler, Inhale 2 puffs every 6 (six) hours as needed for wheezing, Disp: 18 g, Rfl: 5    fluticasone (FLONASE) 50 mcg/act nasal spray, 1 spray into each nostril daily as needed for allergies, Disp: 1 Bottle, Rfl: 5    Symbicort 160-4 5 MCG/ACT inhaler, Inhale 2 puffs 2 (two) times a day Rinse mouth after use , Disp: 10 2 g, Rfl: 5    Past Medical History:       Past Medical History:   Diagnosis Date    Abnormal Pap smear of cervix 02/2018    [facility]    Anxiety     Asthma     Cervical lesion     Degeneration, intervertebral disc, lumbar     Depression     HPV (human papilloma virus) infection 02/2018    (+) type 16; negative type 18 and other HRHPV        Past Surgical History:   Procedure Laterality Date    CERVICAL BIOPSY  W/ LOOP ELECTRODE EXCISION  03/2018    [facility] Dr Janey Gracia N/A 6/21/2019    Procedure: Loida Hosea;  Surgeon: Cortney Davis MD;  [facility];  Service: Gynecology    LAPAROSCOPIC VAGINAL HYSTERECTOMY      W[place] CONIZATION CERVIX,LOOP ELECTRD N/A 3/23/2018    Procedure: BIOPSY LEEP CERVIX;  Surgeon: Maximiliano Garcia MD;  [facility];  Service: Gynecology    WY LAP,RMV  ADNEXAL STRUCTURE Bilateral 6/21/2019    Procedure: SALPINGECTOMY, LAPAROSCOPIC;  Surgeon: Cortney Davis MD;  [facility];  Service: Gynecology    WY LAP,VAG HYST,UTERUS 250GMS/<,SALP-OOPH N/A 6/21/2019    Procedure: HYSTERECTOMY LAPAROSCOPIC ASSISTED VAGINAL (LAVH); Surgeon: Cortney Davis MD;  [facility];  Service: Gynecology    WY TREAT TIBIAL SHAFT FX, INTRAMED IMPLANT Right 5/18/2020    Procedure: INSERTION NAIL IM TIBIA;  Surgeon: Priscilla Schilling MD;  [facility];  Service: Orthopedics    RHINOPLASTY      SALPINGECTOMY      SHOULDER SURGERY Right     anchor inserted    TOOTH EXTRACTION      WOUND DEBRIDEMENT Right 5/18/2020    Procedure: DEBRIDEMENT LOWER EXTREMITY [facility]);   Surgeon: Priscilla Schilling MD;  [facility];  Service: Orthopedics        Family History   Problem Relation Age of Onset    Cancer Mother     Melanoma Mother     Diabetes Father     Hypertension Father     Hyperlipidemia Brother     Ovarian cancer Paternal Grandmother     Cancer Paternal Uncle     Diabetes Family     Hypertension Family         Social History     Socioeconomic History    Marital status:      Spouse name: Not on file    Number of children: Not on file    Years of education: Not on file    Highest education level: Not on file   Occupational History    Not on file   Tobacco Use    Smoking status: Never Smoker    Smokeless tobacco: Never Used   Vaping Use    Vaping Use: Never used   Substance and Sexual Activity    Alcohol use: Yes     Comment: social    Drug use: Never    Sexual activity: Yes Partners: Male     Birth control/protection: None   Other Topics Concern    Not on file   Social History Narrative    ** Merged History Encounter **          Social Determinants of Health     Financial Resource Strain: Not on file   Food Insecurity: Not on file   Transportation Needs: Not on file   Physical Activity: Not on file   Stress: Not on file   Social Connections: Not on file   Intimate Partner Violence: Not on file   Housing Stability: Not on file        Physical Exam:     Physical Exam  Vitals reviewed  Constitutional:       General: She is not in acute distress  Appearance: She is well-developed  She is not diaphoretic  HENT:      Head: Normocephalic and atraumatic  Eyes:      Pupils: Pupils are equal, round, and reactive to light  Cardiovascular:      Rate and Rhythm: Normal rate and regular rhythm  Heart sounds: Normal heart sounds  No murmur heard  No friction rub  No gallop  Pulmonary:      Effort: Pulmonary effort is normal  No respiratory distress  Breath sounds: Normal breath sounds  No wheezing or rales  Musculoskeletal:      Cervical back: Normal range of motion and neck supple  Skin:     General: Skin is warm  Neurological:      Mental Status: She is alert and oriented to person, place, and time  Mental status is at baseline  Psychiatric:         Behavior: Behavior normal          Thought Content: Thought content normal           Data:     Pre-operative work-up    EKG: I have personally reviewed pertinent reports  NSR, no significant change from prior ECG in 5/22/2020      Assessment & Recommendations:     1  Pre-operative clearance  POCT ECG   2  Moderate persistent asthma without complication  Symbicort 517-3 7 MCG/ACT inhaler    albuterol (PROVENTIL HFA,VENTOLIN HFA) 90 mcg/act inhaler       Pre-Op Evaluation Assessment  31 y o  female with planned surgery: removal of hardware, tibia, RLE  Known risk factors for perioperative complications: None  Cardiac Risk Estimation: per the Revised Cardiac Risk Index (Circ  100:1043, 1999), the patient's risk factors for cardiac complications include none, putting her in: RCI RISK CLASS I (0 risk factors, risk of major cardiac compl  appr  0 5%)  Current medications which may produce withdrawal symptoms if withheld perioperatively: none  Pre-Op Evaluation Plan  1  Further preoperative workup as follows:   - None; no further preoperative work-up is required    2  Medication Management/Recommendations:   - None, continue medication regimen including morning of surgery, with sip of water  - Patient has been instructed to avoid aspirin containing medications or non-steroidal anti-inflammatory drugs for the week preceding surgery  3  Prophylaxis for cardiac events with perioperative beta-blockers: not indicated  4  Patient requires further consultation with: None    Clearance  Patient is CLEARED for surgery without any additional cardiac testing  Nena Jones PA-C  [address]  [address] [place] [id number]  Phone#  [phone]  Fax#  [phone]    BMI Counseling: Body mass index is 28 13 kg/m²  The BMI is above normal  Nutrition recommendations include decreasing portion sizes, encouraging healthy choices of fruits and vegetables, limiting drinks that contain sugar and reducing intake of cholesterol  Exercise recommendations include moderate physical activity 150 minutes/week  No pharmacotherapy was ordered  Rationale for BMI follow-up plan is due to patient being overweight or obese

## 2022-01-14 NOTE — PRE-PROCEDURE INSTRUCTIONS
Pre-Surgery Instructions:   Medication Instructions    acetaminophen (TYLENOL) 325 mg tablet Instructed patient per Anesthesia Guidelines   albuterol (PROVENTIL HFA,VENTOLIN HFA) 90 mcg/act inhaler Instructed patient per Anesthesia Guidelines   fluticasone (FLONASE) 50 mcg/act nasal spray Instructed patient per Anesthesia Guidelines   Symbicort 160-4 5 MCG/ACT inhaler Instructed patient per Anesthesia Guidelines  Patient instructed*to use Symbicort IN  the morning of surgery if needed Albuterol IN and tylenol with a sip of water if needed  Patient / instructed on use of chlorhexidine soap per hospital protocol    Patient instructed to stop all ASA, NSAIDS(3 days) vitamins and herbal supplements one week prior to surgery or per Dr Erasmo Marshall

## 2022-01-17 ENCOUNTER — HOSPITAL ENCOUNTER (OUTPATIENT)
Dept: CT IMAGING | Facility: HOSPITAL | Age: 37
Discharge: HOME | End: 2022-01-17
Attending: ORTHOPAEDIC SURGERY
Payer: COMMERCIAL

## 2022-01-17 DIAGNOSIS — Z98.890 STATUS POST OPEN REDUCTION AND INTERNAL FIXATION (ORIF) OF FRACTURE: ICD-10-CM

## 2022-01-17 DIAGNOSIS — Z87.81 STATUS POST OPEN REDUCTION AND INTERNAL FIXATION (ORIF) OF FRACTURE: ICD-10-CM

## 2022-01-17 DIAGNOSIS — T84.84XA PAINFUL ORTHOPAEDIC HARDWARE (HCC): ICD-10-CM

## 2022-01-17 PROCEDURE — G1004 CDSM NDSC: HCPCS

## 2022-01-17 PROCEDURE — 73700 CT LOWER EXTREMITY W/O DYE: CPT

## 2022-01-18 ENCOUNTER — TELEPHONE (OUTPATIENT)
Dept: OBGYN CLINIC | Facility: HOSPITAL | Age: 37
End: 2022-01-18

## 2022-01-18 NOTE — TELEPHONE ENCOUNTER
Patient is calling in reference to the CT scan results which she states show there is still a fracture  Patient would like to know if she should be back in the boot   Patient is scheduled to follow up in office on 01/25/2022    Akiko Luna [phone]

## 2022-01-19 NOTE — TELEPHONE ENCOUNTER
Patient is calling to check on the status of callback from Dr Bella Posada  I spoke with Sherice Alcala who will let Dr Bella Posada know before he leaves today      Callback YL#[phone]

## 2022-01-20 ENCOUNTER — ANESTHESIA (OUTPATIENT)
Dept: PERIOP | Facility: HOSPITAL | Age: 37
End: 2022-01-20
Payer: COMMERCIAL

## 2022-01-25 ENCOUNTER — OFFICE VISIT (OUTPATIENT)
Dept: OBGYN CLINIC | Facility: CLINIC | Age: 37
End: 2022-01-25
Payer: COMMERCIAL

## 2022-01-25 VITALS
DIASTOLIC BLOOD PRESSURE: 68 MMHG | HEIGHT: 68 IN | HEART RATE: 102 BPM | SYSTOLIC BLOOD PRESSURE: 102 MMHG | BODY MASS INDEX: 27.68 KG/M2 | WEIGHT: 182.6 LBS

## 2022-01-25 DIAGNOSIS — T78.40XA HYPERSENSITIVITY, INITIAL ENCOUNTER: ICD-10-CM

## 2022-01-25 DIAGNOSIS — M70.61 TROCHANTERIC BURSITIS OF RIGHT HIP: Primary | ICD-10-CM

## 2022-01-25 PROCEDURE — 20610 DRAIN/INJ JOINT/BURSA W/O US: CPT | Performed by: ORTHOPAEDIC SURGERY

## 2022-01-25 PROCEDURE — 99213 OFFICE O/P EST LOW 20 MIN: CPT | Performed by: ORTHOPAEDIC SURGERY

## 2022-01-25 RX ADMIN — METHYLPREDNISOLONE ACETATE 1 ML: 80 INJECTION, SUSPENSION INTRA-ARTICULAR; INTRALESIONAL; INTRAMUSCULAR; SOFT TISSUE at 11:01

## 2022-01-25 RX ADMIN — BUPIVACAINE HYDROCHLORIDE 2 ML: 2.5 INJECTION, SOLUTION INFILTRATION; PERINEURAL at 11:01

## 2022-01-25 NOTE — PROGRESS NOTES
Orthopaedics Office Visit - Follow up Patient Visit    ASSESSMENT/PLAN:    Assessment:   Status post ORIF right tibia shaft fracture     DOS 5/18/20  Painful orthopedic hardware - will plan to remove two distal bolts  Right greater trochanter bursitis      Plan:   - weightbear as tolerated right lower extremity   - Will delay surgery secondary to + covid infection  - Patient referred to pain management for evaluation of hypersensitivity of the right lower extremity   - Offered patient cortisone injection for the right hip  Patient accepted and tolerated well  - Over the counter analgesics as needed / directed   - Ice / heat as directed   - Patient scheduled for surgery in March follow up post op  To Do Next Visit:  Sutures out, XR     _____________________________________________________  CHIEF COMPLAINT:  Chief Complaint   Patient presents with    Right Knee - Follow-up         SUBJECTIVE:  Margareth Martínez is a 31 y o  female who presents to the office today for a follow up evaluation of her right leg and for a discussion of her recent CT   She states that on 05/18/2020 while working at [employer], a carton of chalk fell on her  She states that 100 boxes fell on her and each box weighs 20 pounds  She was seen in the ED immediately after the injury occurred for an open right tibia shaft fracture she underwent surgery performed by Dr Randa Nixon, and roc and screws were placed  She states that she attended formal physical therapy but plateaued with her improvement  She states that she was never pain free after the surgery  She notes that she will experience stiffness into her ankle and discomfort where the screws are located  She states that she will experience daily intermittent in dull aching pain which is exacerbated with prolonged periods of standing and walking  She states that due to the pain she experiences she compensate when she walks and is now spirits and pain into her right hip    She states that she has not used any over-the-counter anti-inflammatories for pain relief  She states that she will ice her ankle on occasions  Patient offers no other complaints at this time         PAST MEDICAL HISTORY:  Past Medical History:   Diagnosis Date    Abnormal Pap smear of cervix 02/2018    [facility]    Ankle pain     Anxiety     Asthma     Cervical lesion     Degeneration, intervertebral disc, lumbar     Depression     Fracture tibia/fibula     right    History of COVID-19 01/07/2022    started with symptoms per pt 1/2/22 postnasal drip and coughing/ 1/14 postnasal drips persists but better per pt    HPV (human papilloma virus) infection 02/2018    (+) type 16; negative type 18 and other HRHPV    Rosacea     Shortness of breath     "with asthma and change of seasons"    Work related injury 05/2020       PAST SURGICAL HISTORY:  Past Surgical History:   Procedure Laterality Date    CERVICAL BIOPSY  W/ LOOP ELECTRODE EXCISION  03/2018    [facility] N/A 6/21/2019    Procedure: Nicholas Em;  Surgeon: Rachelle Carrera MD;  [facility];  Service: Gynecology    GANGLION CYST EXCISION      LAPAROSCOPIC VAGINAL HYSTERECTOMY      KS CONIZATION CERVIX,LOOP ELECTRD N/A 3/23/2018    Procedure: BIOPSY LEEP CERVIX;  Surgeon: Vee Koenig MD;  [facility];  Service: Gynecology    KS LAP,RMV  ADNEXAL STRUCTURE Bilateral 6/21/2019    Procedure: SALPINGECTOMY, LAPAROSCOPIC;  Surgeon: Rachelle Carrera MD;  [facility];  Service: Gynecology    KS LAP,VAG HYST,UTERUS 250GMS/<,SALP-OOPH N/A 6/21/2019    Procedure: HYSTERECTOMY LAPAROSCOPIC ASSISTED VAGINAL (LAVH);   Surgeon: Rachelle Carrera MD;  [facility];  Service: Gynecology    KS TREAT TIBIAL SHAFT FX, INTRAMED IMPLANT Right 5/18/2020    Procedure: INSERTION NAIL IM TIBIA;  Surgeon: Jose G Washington MD;  [facility];  Service: Orthopedics    RHINOPLASTY      SHOULDER SURGERY Right     anchor inserted    TOOTH EXTRACTION      WOUND DEBRIDEMENT Right 5/18/2020    Procedure: DEBRIDEMENT LOWER EXTREMITY [facility] OUT); Surgeon: Jv Burt MD;  [facility];  Service: Orthopedics       FAMILY HISTORY:  Family History   Problem Relation Age of Onset    Cancer Mother     Melanoma Mother     Diabetes Father     Hypertension Father     Hyperlipidemia Brother     Ovarian cancer Paternal Grandmother     Cancer Paternal Uncle     Diabetes Family     Hypertension Family        SOCIAL HISTORY:  Social History     Tobacco Use    Smoking status: Never Smoker    Smokeless tobacco: Never Used   Vaping Use    Vaping Use: Never used   Substance Use Topics    Alcohol use: Yes     Comment: social    Drug use: Never       MEDICATIONS:    Current Outpatient Medications:     acetaminophen (TYLENOL) 325 mg tablet, Take 3 tablets (975 mg total) by mouth every 8 (eight) hours, Disp: 30 tablet, Rfl: 0    albuterol (PROVENTIL HFA,VENTOLIN HFA) 90 mcg/act inhaler, Inhale 2 puffs every 6 (six) hours as needed for wheezing, Disp: 18 g, Rfl: 5    fluticasone (FLONASE) 50 mcg/act nasal spray, 1 spray into each nostril daily as needed for allergies, Disp: 1 Bottle, Rfl: 5    Symbicort 160-4 5 MCG/ACT inhaler, Inhale 2 puffs 2 (two) times a day Rinse mouth after use , Disp: 10 2 g, Rfl: 5    ALLERGIES:  Allergies   Allergen Reactions    Cat Hair Extract Allergic Rhinitis    Pollen Extract Allergic Rhinitis    Nickel Itching     Redness/puffy to ears/ redness to skin if exposed to Nickel       REVIEW OF SYSTEMS:  MSK: right leg pain   Neuro: WNL   Pertinent items are otherwise noted in HPI  A comprehensive review of systems was otherwise negative      LABS:  HgA1c:   Lab Results   Component Value Date    HGBA1C 5 1 06/14/2019     BMP:   Lab Results   Component Value Date    GLUCOSE 92 05/18/2020    CALCIUM 9 4 01/03/2022     03/03/2015    K 3 8 01/03/2022    CO2 28 01/03/2022     01/03/2022 BUN 10 01/03/2022    CREATININE 0 68 01/03/2022     CBC: No components found for: CBC    _____________________________________________________  PHYSICAL EXAMINATION:  Vital signs: /68   Pulse 102   Ht 5' 8" (1 727 m)   Wt 82 8 kg (182 lb 9 6 oz)   LMP 05/01/2019   BMI 27 76 kg/m²   General: No acute distress, awake and alert  Psychiatric: Mood and affect appear appropriate  HEENT: Trachea Midline, No torticollis, no apparent facial trauma  Cardiovascular: No audible murmurs; Extremities appear perfused  Pulmonary: No audible wheezing or stridor  Skin: No open lesions; see further details (if any) below      MUSCULOSKELETAL EXAMINATION:  Right lower extremity examination:  - Patient sitting comfortably in the office in no acute distress   - healed incision sites noted over the knee, a distal tibia  No acute visible abnormalities present  - tenderness to palpation noted over the trochanteric bursa, midshaft tibia  Hypersensitivity noted over the medial distal ankle  - full range of motion of the right knee and ankle with no pain  - NV intact    _____________________________________________________  STUDIES REVIEWED:  I personally reviewed the images and interpretation is as follows:  CT lower extremity wo contrast right  Status post ORIF of mid to distal tibial shaft fracture which appears nearly completely healed clavicular is a visible cortical fracture line still seen posteromedially  Partially fused oblique fracture proximal 3rd of the fibular shaft  PROCEDURES PERFORMED:  Large joint arthrocentesis: R greater trochanteric bursa  Universal Protocol:  Consent: Verbal consent not obtained    Risks and benefits: risks, benefits and alternatives were discussed  Consent given by: patient  Patient understanding: patient states understanding of the procedure being performed  Site marked: the operative site was marked  Patient identity confirmed: verbally with patient    Supporting Documentation  Indications: pain   Procedure Details  Location: hip - R greater trochanteric bursa  Preparation: Patient was prepped and draped in the usual sterile fashion  Needle size: 22 G  Ultrasound guidance: no  Approach: anterolateral  Medications administered: 2 mL bupivacaine 0 25 %; 1 mL methylPREDNISolone acetate 80 mg/mL    Patient tolerance: patient tolerated the procedure well with no immediate complications  Dressing:  Sterile dressing applied          Elian Archibald PA-C - assisting    Manish Larson MD                  Portions of the record may have been created with voice recognition software  Occasional wrong word or "sound a like" substitutions may have occurred due to the inherent limitations of voice recognition software  Read the chart carefully and recognize, using context, where substitutions have occurred

## 2022-01-26 PROBLEM — M70.61 TROCHANTERIC BURSITIS OF RIGHT HIP: Status: ACTIVE | Noted: 2022-01-26

## 2022-01-26 RX ORDER — BUPIVACAINE HYDROCHLORIDE 2.5 MG/ML
2 INJECTION, SOLUTION INFILTRATION; PERINEURAL
Status: COMPLETED | OUTPATIENT
Start: 2022-01-25 | End: 2022-01-25

## 2022-01-26 RX ORDER — METHYLPREDNISOLONE ACETATE 80 MG/ML
1 INJECTION, SUSPENSION INTRA-ARTICULAR; INTRALESIONAL; INTRAMUSCULAR; SOFT TISSUE
Status: COMPLETED | OUTPATIENT
Start: 2022-01-25 | End: 2022-01-25

## 2022-02-22 NOTE — PRE-PROCEDURE INSTRUCTIONS
Pre-Surgery Instructions:   Medication Instructions    acetaminophen (TYLENOL) 325 mg tablet Instructed patient per Anesthesia Guidelines   albuterol (PROVENTIL HFA,VENTOLIN HFA) 90 mcg/act inhaler Instructed patient per Anesthesia Guidelines   fluticasone (FLONASE) 50 mcg/act nasal spray Instructed patient per Anesthesia Guidelines   Symbicort 160-4 5 MCG/ACT inhaler Instructed patient per Anesthesia Guidelines  Spoke with patient medication list reviewed  Pt told to stop all nsaids and vitamins 1 week prior to surgery  Pt ok to use inhaler DOS and tylenol with sip of water DOS if needed  Npo after midnight  Pt denies covid symptoms was positive for covid early January  Pt only had a stuffy  Nose which has resolved  She is not vaccinated  Pt has surgical soap and understands shower instructions  No shaving 24 hrs prior to surgery  Not to apply powder creams deodorants after showers  Use clean sheets towels and clothes  Bring photo id and insurance card  No jewelry or contacts  1 adult visitor ok must wear mask and be vaccinated  Pt must have a ride home after surgery  [address] [place] [address] will call 2/28 with arrival time and directions

## 2022-02-28 ENCOUNTER — ANESTHESIA EVENT (OUTPATIENT)
Dept: PERIOP | Facility: HOSPITAL | Age: 37
End: 2022-02-28
Payer: SELF-PAY

## 2022-02-28 NOTE — ANESTHESIA PREPROCEDURE EVALUATION
Procedure:  ABDOMINOPLASTY (N/A Abdomen)  LIPOSUCTION OF THIGH (Bilateral Thigh)    Relevant Problems   MUSCULOSKELETAL   (+) DDD (degenerative disc disease), cervical   (+) Disc degeneration, lumbar   (+) Lower back pain (Resolved)   (+) Myofascial pain syndrome (Resolved)      NEURO/PSYCH   (+) Myofascial pain syndrome (Resolved)   (+) PTSD (post-traumatic stress disorder)   (+) Panic disorder without agoraphobia   (+) Status post open reduction and internal fixation (ORIF) of fracture      PULMONARY   (+) Moderate persistent asthma without complication      Other   (+) Allergic rhinitis   (+) Diffuse nontoxic goiter   (+) Neck pain (Resolved)      S/p hysterectomy  Covid + on 1/7/22 with start of symptoms on 1/14/22  Results for Robb Rooney (MRN [id number]) as of 2/28/2022 14:00   Ref  Range 1/7/2022 10:44   SARS-COV-2 Latest Ref Range: Negative  Positive (A)       Physical Exam    Airway    Mallampati score: II  TM Distance: >3 FB  Neck ROM: full     Dental   No notable dental hx     Cardiovascular      Pulmonary      Other Findings        Anesthesia Plan  ASA Score- 2     Anesthesia Type- general with ASA Monitors  Additional Monitors:   Airway Plan: ETT  Plan Factors-Exercise tolerance (METS): >4 METS  Chart reviewed  Existing labs reviewed  Patient summary reviewed  Patient is not a current smoker  Patient did not smoke on day of surgery  Induction- intravenous  Postoperative Plan- Plan for postoperative opioid use  Planned trial extubation    Informed Consent- Anesthetic plan and risks discussed with patient  I personally reviewed this patient with the CRNA  Discussed and agreed on the Anesthesia Plan with the CRNA  Dereje Snyder

## 2022-03-01 ENCOUNTER — ANESTHESIA (OUTPATIENT)
Dept: PERIOP | Facility: HOSPITAL | Age: 37
End: 2022-03-01
Payer: SELF-PAY

## 2022-03-01 ENCOUNTER — HOSPITAL ENCOUNTER (OUTPATIENT)
Facility: HOSPITAL | Age: 37
Setting detail: OUTPATIENT SURGERY
Discharge: HOME | End: 2022-03-01
Attending: SURGERY | Admitting: SURGERY
Payer: SELF-PAY

## 2022-03-01 VITALS
RESPIRATION RATE: 18 BRPM | HEIGHT: 68 IN | SYSTOLIC BLOOD PRESSURE: 114 MMHG | HEART RATE: 83 BPM | DIASTOLIC BLOOD PRESSURE: 50 MMHG | WEIGHT: 182 LBS | BODY MASS INDEX: 27.58 KG/M2 | TEMPERATURE: 98.9 F | OXYGEN SATURATION: 96 %

## 2022-03-01 RX ORDER — METOCLOPRAMIDE HYDROCHLORIDE 5 MG/ML
INJECTION INTRAMUSCULAR; INTRAVENOUS AS NEEDED
Status: DISCONTINUED | OUTPATIENT
Start: 2022-03-01 | End: 2022-03-01

## 2022-03-01 RX ORDER — ALBUTEROL SULFATE 2.5 MG/3ML
2.5 SOLUTION RESPIRATORY (INHALATION) ONCE AS NEEDED
Status: DISCONTINUED | OUTPATIENT
Start: 2022-03-01 | End: 2022-03-01 | Stop reason: HOSPADM

## 2022-03-01 RX ORDER — FENTANYL CITRATE 50 UG/ML
INJECTION, SOLUTION INTRAMUSCULAR; INTRAVENOUS AS NEEDED
Status: DISCONTINUED | OUTPATIENT
Start: 2022-03-01 | End: 2022-03-01

## 2022-03-01 RX ORDER — LIDOCAINE HYDROCHLORIDE 10 MG/ML
INJECTION, SOLUTION EPIDURAL; INFILTRATION; INTRACAUDAL; PERINEURAL AS NEEDED
Status: DISCONTINUED | OUTPATIENT
Start: 2022-03-01 | End: 2022-03-01

## 2022-03-01 RX ORDER — HYDROCODONE BITARTRATE AND ACETAMINOPHEN 5; 325 MG/1; MG/1
2 TABLET ORAL EVERY 4 HOURS PRN
Status: DISCONTINUED | OUTPATIENT
Start: 2022-03-01 | End: 2022-03-01 | Stop reason: HOSPADM

## 2022-03-01 RX ORDER — LIDOCAINE HYDROCHLORIDE AND EPINEPHRINE 5; 5 MG/ML; UG/ML
INJECTION, SOLUTION INFILTRATION; PERINEURAL AS NEEDED
Status: DISCONTINUED | OUTPATIENT
Start: 2022-03-01 | End: 2022-03-01 | Stop reason: HOSPADM

## 2022-03-01 RX ORDER — BUPIVACAINE HYDROCHLORIDE 2.5 MG/ML
INJECTION, SOLUTION EPIDURAL; INFILTRATION; INTRACAUDAL AS NEEDED
Status: DISCONTINUED | OUTPATIENT
Start: 2022-03-01 | End: 2022-03-01 | Stop reason: HOSPADM

## 2022-03-01 RX ORDER — SODIUM CHLORIDE, SODIUM LACTATE, POTASSIUM CHLORIDE, CALCIUM CHLORIDE 600; 310; 30; 20 MG/100ML; MG/100ML; MG/100ML; MG/100ML
20 INJECTION, SOLUTION INTRAVENOUS CONTINUOUS
Status: DISCONTINUED | OUTPATIENT
Start: 2022-03-01 | End: 2022-03-01 | Stop reason: HOSPADM

## 2022-03-01 RX ORDER — DEXAMETHASONE SODIUM PHOSPHATE 10 MG/ML
INJECTION, SOLUTION INTRAMUSCULAR; INTRAVENOUS AS NEEDED
Status: DISCONTINUED | OUTPATIENT
Start: 2022-03-01 | End: 2022-03-01

## 2022-03-01 RX ORDER — CEFAZOLIN SODIUM 2 G/50ML
2000 SOLUTION INTRAVENOUS ONCE
Status: DISCONTINUED | OUTPATIENT
Start: 2022-03-01 | End: 2022-03-01 | Stop reason: HOSPADM

## 2022-03-01 RX ORDER — MINERAL OIL
OIL (ML) MISCELLANEOUS AS NEEDED
Status: DISCONTINUED | OUTPATIENT
Start: 2022-03-01 | End: 2022-03-01 | Stop reason: HOSPADM

## 2022-03-01 RX ORDER — MIDAZOLAM HYDROCHLORIDE 2 MG/2ML
INJECTION, SOLUTION INTRAMUSCULAR; INTRAVENOUS AS NEEDED
Status: DISCONTINUED | OUTPATIENT
Start: 2022-03-01 | End: 2022-03-01

## 2022-03-01 RX ORDER — FENTANYL CITRATE/PF 50 MCG/ML
50 SYRINGE (ML) INJECTION
Status: DISCONTINUED | OUTPATIENT
Start: 2022-03-01 | End: 2022-03-01 | Stop reason: HOSPADM

## 2022-03-01 RX ORDER — PROPOFOL 10 MG/ML
INJECTION, EMULSION INTRAVENOUS AS NEEDED
Status: DISCONTINUED | OUTPATIENT
Start: 2022-03-01 | End: 2022-03-01

## 2022-03-01 RX ORDER — CEFAZOLIN SODIUM 2 G/50ML
SOLUTION INTRAVENOUS AS NEEDED
Status: DISCONTINUED | OUTPATIENT
Start: 2022-03-01 | End: 2022-03-01

## 2022-03-01 RX ORDER — LIDOCAINE HYDROCHLORIDE 10 MG/ML
0.5 INJECTION, SOLUTION EPIDURAL; INFILTRATION; INTRACAUDAL; PERINEURAL ONCE AS NEEDED
Status: DISCONTINUED | OUTPATIENT
Start: 2022-03-01 | End: 2022-03-01 | Stop reason: HOSPADM

## 2022-03-01 RX ORDER — ONDANSETRON 2 MG/ML
INJECTION INTRAMUSCULAR; INTRAVENOUS AS NEEDED
Status: DISCONTINUED | OUTPATIENT
Start: 2022-03-01 | End: 2022-03-01

## 2022-03-01 RX ORDER — ONDANSETRON 2 MG/ML
4 INJECTION INTRAMUSCULAR; INTRAVENOUS ONCE AS NEEDED
Status: DISCONTINUED | OUTPATIENT
Start: 2022-03-01 | End: 2022-03-01 | Stop reason: HOSPADM

## 2022-03-01 RX ORDER — SODIUM CHLORIDE, SODIUM LACTATE, POTASSIUM CHLORIDE, CALCIUM CHLORIDE 600; 310; 30; 20 MG/100ML; MG/100ML; MG/100ML; MG/100ML
125 INJECTION, SOLUTION INTRAVENOUS CONTINUOUS
Status: DISCONTINUED | OUTPATIENT
Start: 2022-03-01 | End: 2022-03-01 | Stop reason: HOSPADM

## 2022-03-01 RX ORDER — ROCURONIUM BROMIDE 10 MG/ML
INJECTION, SOLUTION INTRAVENOUS AS NEEDED
Status: DISCONTINUED | OUTPATIENT
Start: 2022-03-01 | End: 2022-03-01

## 2022-03-01 RX ADMIN — FENTANYL CITRATE 50 MCG: 50 INJECTION, SOLUTION INTRAMUSCULAR; INTRAVENOUS at 13:48

## 2022-03-01 RX ADMIN — DEXAMETHASONE SODIUM PHOSPHATE 4 MG: 10 INJECTION, SOLUTION INTRAMUSCULAR; INTRAVENOUS at 12:45

## 2022-03-01 RX ADMIN — FENTANYL CITRATE 50 MCG: 50 INJECTION INTRAMUSCULAR; INTRAVENOUS at 15:40

## 2022-03-01 RX ADMIN — ROCURONIUM BROMIDE 10 MG: 10 INJECTION, SOLUTION INTRAVENOUS at 13:48

## 2022-03-01 RX ADMIN — ROCURONIUM BROMIDE 10 MG: 10 INJECTION, SOLUTION INTRAVENOUS at 14:19

## 2022-03-01 RX ADMIN — FENTANYL CITRATE 25 MCG: 50 INJECTION, SOLUTION INTRAMUSCULAR; INTRAVENOUS at 13:11

## 2022-03-01 RX ADMIN — CEFAZOLIN SODIUM 2000 MG: 2 SOLUTION INTRAVENOUS at 12:36

## 2022-03-01 RX ADMIN — LIDOCAINE HYDROCHLORIDE 5 ML: 10 INJECTION, SOLUTION EPIDURAL; INFILTRATION; INTRACAUDAL; PERINEURAL at 12:43

## 2022-03-01 RX ADMIN — METOCLOPRAMIDE 10 MG: 5 INJECTION, SOLUTION INTRAMUSCULAR; INTRAVENOUS at 12:36

## 2022-03-01 RX ADMIN — FENTANYL CITRATE 100 MCG: 50 INJECTION, SOLUTION INTRAMUSCULAR; INTRAVENOUS at 12:42

## 2022-03-01 RX ADMIN — ONDANSETRON 4 MG: 2 INJECTION INTRAMUSCULAR; INTRAVENOUS at 12:53

## 2022-03-01 RX ADMIN — MIDAZOLAM 2 MG: 1 INJECTION INTRAMUSCULAR; INTRAVENOUS at 12:34

## 2022-03-01 RX ADMIN — ROCURONIUM BROMIDE 10 MG: 10 INJECTION, SOLUTION INTRAVENOUS at 13:27

## 2022-03-01 RX ADMIN — FENTANYL CITRATE 75 MCG: 50 INJECTION, SOLUTION INTRAMUSCULAR; INTRAVENOUS at 13:25

## 2022-03-01 RX ADMIN — PROPOFOL 200 MG: 10 INJECTION, EMULSION INTRAVENOUS at 12:44

## 2022-03-01 RX ADMIN — ROCURONIUM BROMIDE 50 MG: 10 INJECTION, SOLUTION INTRAVENOUS at 12:45

## 2022-03-01 RX ADMIN — SODIUM CHLORIDE, SODIUM LACTATE, POTASSIUM CHLORIDE, AND CALCIUM CHLORIDE: .6; .31; .03; .02 INJECTION, SOLUTION INTRAVENOUS at 12:30

## 2022-03-01 RX ADMIN — FENTANYL CITRATE 50 MCG: 50 INJECTION, SOLUTION INTRAMUSCULAR; INTRAVENOUS at 14:48

## 2022-03-01 RX ADMIN — FENTANYL CITRATE 50 MCG: 50 INJECTION, SOLUTION INTRAMUSCULAR; INTRAVENOUS at 14:19

## 2022-03-01 RX ADMIN — FENTANYL CITRATE 50 MCG: 50 INJECTION, SOLUTION INTRAMUSCULAR; INTRAVENOUS at 14:10

## 2022-03-01 RX ADMIN — FENTANYL CITRATE 50 MCG: 50 INJECTION INTRAMUSCULAR; INTRAVENOUS at 15:35

## 2022-03-01 RX ADMIN — HYDROCODONE BITARTRATE AND ACETAMINOPHEN 2 TABLET: 5; 325 TABLET ORAL at 16:29

## 2022-03-01 RX ADMIN — FENTANYL CITRATE 50 MCG: 50 INJECTION, SOLUTION INTRAMUSCULAR; INTRAVENOUS at 15:16

## 2022-03-01 RX ADMIN — ROCURONIUM BROMIDE 10 MG: 10 INJECTION, SOLUTION INTRAVENOUS at 13:12

## 2022-03-01 RX ADMIN — SUGAMMADEX 165 MG: 100 INJECTION, SOLUTION INTRAVENOUS at 15:12

## 2022-03-01 NOTE — NURSING NOTE
Ambulated to the bathroom with supervision of staff  Voided  Wanted to rest prior to going home  Drain care was completed with patient and her significant other  Pain decreased after Norco given

## 2022-03-01 NOTE — INTERVAL H&P NOTE
H&P reviewed  After examining the patient I find no changes in the patients condition since the H&P had been written      Vitals:    03/01/22 0958   BP: 108/59   Pulse: 76   Resp: 20   Temp: 98 9 °F (37 2 °C)   SpO2: 96%

## 2022-03-01 NOTE — ANESTHESIA POSTPROCEDURE EVALUATION
Post-Op Assessment Note    CV Status:  Stable  Pain Score: 0    Pain management: adequate     Mental Status:  Awake   Hydration Status:  Stable   PONV Controlled:  Controlled   Airway Patency:  Patent       Staff: CRNA         No complications documented      BP   107/65   Temp   98   Pulse  77   Resp   24   SpO2   100

## 2022-03-01 NOTE — OP NOTE
OPERATIVE REPORT  PATIENT NAME: Justin Tijerina    :  1985  MRN: [id number]  Pt Location:  OR ROOM 08    SURGERY DATE: 3/1/2022    Surgeon(s) and Role:     * Ramón Schaefer MD - Primary    Preop Diagnosis:  Localized adiposity [E65]    Post-Op Diagnosis Codes:     * Localized adiposity [E65]    Procedure(s) (LRB):  ABDOMINOPLASTY (N/A)  LIPOSUCTION OF THIGH (Bilateral)    Specimen(s):  * No specimens in log *    Estimated Blood Loss:   Minimal    Drains:  Closed/Suction Drain Left; Inferior Abdomen Bulb 15 Fr  (Active)   Number of days: 0       Anesthesia Type:   General    Operative Indications:  Localized adiposity [E65]      Operative Findings:      Complications:   None    Procedure and Technique:  The patient was properly identified in the operating room, initially    intubated by anesthesia and placed in the supine position with adequate    padding support  We went ahead and tumesced the flank area, the inner thighs and the abdomen and mons area for 2 liter of our tumescent    solution and suctioned these areas for 3800 ccst  The stab    incisions were closed with 4-0 chromic and a Dermabond dressing  We made an incision in the suprapubic line hip to hip and elevated the skin off   of the muscle layer dissecting all the way up to the xiphoid process after  the umbillical stalk    We plicated the midline of the abdomen with both interrupted    Prolene suture and and a running 2-0 vicryl suture    We injected 40 mL of 0 25% Marcaine with epinephrine into the abdominal    fascia  We pulled the excess skin inferiorly toward the feet  The    excess skin was then removed with a #15 blade and electrocautery  We    placed a 15-K[place] Uriel drain into the abdominal defect and pulled it    out through the lateral aspect of our incision line  The drain was    held in place with 3-0 nylon suture   We then went ahead and closed our    deep fascia with 2-0 Vicryl, 2-0 PDS, deep dermis of 2-0 Vicryl, 3-0    PDS, a running subcuticular 3-0 Monocryl stitch and a Dermabond    dressing        The patient was then Dermabonded, placed into compression garment  and,   awakened from surgery and taken to the recovery room in stable condition  All counts were correct at the end of the procedure       I was present for the entire procedure    Patient Disposition:  PACU       SIGNATURE: Israel Novak MD  DATE: March 1, 2022  TIME: 3:05 PM

## 2022-03-01 NOTE — DISCHARGE SUMMARY
PLASTIC, RECONSTRUCTIVE, & HAND SURGERY   Discharge Summary  Date of Admission:   3/1/2022  Date of Discharge:   03/01/22  Attending:  Dolores Green MD  Principal/Final Diagnosis:   Localized adiposity [E65]  Principal Procedure:   ABDOMINOPLASTY (N/A Abdomen)  LIPOSUCTION OF THIGH (Bilateral Thigh)  Discharge Medications:  See after visit summary for reconciled discharge medications provided to patient and family  Reason for Admission:  Beltran Beltran was electively admitted to undergo the above named procedure on 3/1/2022 as an outpatient  Hospital Course:  Patient underwent the above named procedure on the day of admission without complications  They were discharged home the same day  Disposition:  To home in care of self and family    Condition:  Good  Follow up:  Patient with follow up in the office with Dr Dolores Green MD in 7 day(s) or as scheduled per his office  Dolores Green MD  3/1/2022 3:13 PM

## 2022-03-18 NOTE — PRE-PROCEDURE INSTRUCTIONS
Pre-Surgery Instructions:   Medication Instructions    acetaminophen (TYLENOL) 325 mg tablet Instructed patient per Anesthesia Guidelines   albuterol (PROVENTIL HFA,VENTOLIN HFA) 90 mcg/act inhaler Instructed patient per Anesthesia Guidelines  prn may use dos    fluticasone (FLONASE) 50 mcg/act nasal spray Instructed patient per Anesthesia Guidelines  prn may use     Symbicort 160-4 5 MCG/ACT inhaler Instructed patient per Anesthesia Guidelines  daily continue   Pre procedure instructions given, verbalizes understanding  NPO after MN  Bathing reviewed  Stop supplements/vitamins

## 2022-03-23 ENCOUNTER — HOSPITAL ENCOUNTER (OUTPATIENT)
Dept: RADIOLOGY | Facility: HOSPITAL | Age: 37
Setting detail: OUTPATIENT SURGERY
Discharge: HOME | End: 2022-03-23
Payer: COMMERCIAL

## 2022-03-23 ENCOUNTER — HOSPITAL ENCOUNTER (OUTPATIENT)
Facility: HOSPITAL | Age: 37
Setting detail: OUTPATIENT SURGERY
Discharge: HOME | End: 2022-03-23
Attending: ORTHOPAEDIC SURGERY | Admitting: ORTHOPAEDIC SURGERY
Payer: COMMERCIAL

## 2022-03-23 VITALS
BODY MASS INDEX: 25.76 KG/M2 | OXYGEN SATURATION: 99 % | SYSTOLIC BLOOD PRESSURE: 107 MMHG | HEIGHT: 68 IN | WEIGHT: 170 LBS | TEMPERATURE: 98.2 F | HEART RATE: 85 BPM | DIASTOLIC BLOOD PRESSURE: 46 MMHG | RESPIRATION RATE: 16 BRPM

## 2022-03-23 DIAGNOSIS — Z87.81 STATUS POST OPEN REDUCTION AND INTERNAL FIXATION (ORIF) OF FRACTURE: ICD-10-CM

## 2022-03-23 DIAGNOSIS — Z98.890 STATUS POST OPEN REDUCTION AND INTERNAL FIXATION (ORIF) OF FRACTURE: ICD-10-CM

## 2022-03-23 DIAGNOSIS — T84.84XA PAINFUL ORTHOPAEDIC HARDWARE (HCC): Primary | ICD-10-CM

## 2022-03-23 PROCEDURE — 20680 REMOVAL OF IMPLANT DEEP: CPT | Performed by: PHYSICIAN ASSISTANT

## 2022-03-23 PROCEDURE — 20680 REMOVAL OF IMPLANT DEEP: CPT | Performed by: ORTHOPAEDIC SURGERY

## 2022-03-23 PROCEDURE — 73600 X-RAY EXAM OF ANKLE: CPT

## 2022-03-23 PROCEDURE — NC001 PR NO CHARGE: Performed by: ORTHOPAEDIC SURGERY

## 2022-03-23 RX ORDER — FENTANYL CITRATE 50 UG/ML
INJECTION, SOLUTION INTRAMUSCULAR; INTRAVENOUS AS NEEDED
Status: DISCONTINUED | OUTPATIENT
Start: 2022-03-23 | End: 2022-03-23

## 2022-03-23 RX ORDER — ACETAMINOPHEN 325 MG/1
650 TABLET ORAL EVERY 6 HOURS PRN
Status: DISCONTINUED | OUTPATIENT
Start: 2022-03-23 | End: 2022-03-23 | Stop reason: HOSPADM

## 2022-03-23 RX ORDER — BUPIVACAINE HYDROCHLORIDE 2.5 MG/ML
INJECTION, SOLUTION EPIDURAL; INFILTRATION; INTRACAUDAL AS NEEDED
Status: DISCONTINUED | OUTPATIENT
Start: 2022-03-23 | End: 2022-03-23 | Stop reason: HOSPADM

## 2022-03-23 RX ORDER — ONDANSETRON 2 MG/ML
4 INJECTION INTRAMUSCULAR; INTRAVENOUS EVERY 6 HOURS PRN
Status: DISCONTINUED | OUTPATIENT
Start: 2022-03-23 | End: 2022-03-23 | Stop reason: HOSPADM

## 2022-03-23 RX ORDER — HYDROMORPHONE HCL/PF 1 MG/ML
0.5 SYRINGE (ML) INJECTION
Status: DISCONTINUED | OUTPATIENT
Start: 2022-03-23 | End: 2022-03-23 | Stop reason: HOSPADM

## 2022-03-23 RX ORDER — LIDOCAINE HYDROCHLORIDE 10 MG/ML
INJECTION, SOLUTION EPIDURAL; INFILTRATION; INTRACAUDAL; PERINEURAL AS NEEDED
Status: DISCONTINUED | OUTPATIENT
Start: 2022-03-23 | End: 2022-03-23

## 2022-03-23 RX ORDER — FENTANYL CITRATE/PF 50 MCG/ML
50 SYRINGE (ML) INJECTION
Status: COMPLETED | OUTPATIENT
Start: 2022-03-23 | End: 2022-03-23

## 2022-03-23 RX ORDER — TRAMADOL HYDROCHLORIDE 50 MG/1
50 TABLET ORAL EVERY 6 HOURS SCHEDULED
Status: DISCONTINUED | OUTPATIENT
Start: 2022-03-23 | End: 2022-03-23 | Stop reason: HOSPADM

## 2022-03-23 RX ORDER — CHLORHEXIDINE GLUCONATE 0.12 MG/ML
15 RINSE ORAL ONCE
Status: DISCONTINUED | OUTPATIENT
Start: 2022-03-23 | End: 2022-03-23

## 2022-03-23 RX ORDER — MAGNESIUM HYDROXIDE 1200 MG/15ML
LIQUID ORAL AS NEEDED
Status: DISCONTINUED | OUTPATIENT
Start: 2022-03-23 | End: 2022-03-23 | Stop reason: HOSPADM

## 2022-03-23 RX ORDER — PROPOFOL 10 MG/ML
INJECTION, EMULSION INTRAVENOUS AS NEEDED
Status: DISCONTINUED | OUTPATIENT
Start: 2022-03-23 | End: 2022-03-23

## 2022-03-23 RX ORDER — SODIUM CHLORIDE, SODIUM LACTATE, POTASSIUM CHLORIDE, CALCIUM CHLORIDE 600; 310; 30; 20 MG/100ML; MG/100ML; MG/100ML; MG/100ML
INJECTION, SOLUTION INTRAVENOUS CONTINUOUS PRN
Status: DISCONTINUED | OUTPATIENT
Start: 2022-03-23 | End: 2022-03-23

## 2022-03-23 RX ORDER — OXYCODONE HYDROCHLORIDE 5 MG/1
5 TABLET ORAL EVERY 6 HOURS PRN
Qty: 10 TABLET | Refills: 0 | Status: SHIPPED | OUTPATIENT
Start: 2022-03-23 | End: 2022-04-02

## 2022-03-23 RX ORDER — MIDAZOLAM HYDROCHLORIDE 2 MG/2ML
INJECTION, SOLUTION INTRAMUSCULAR; INTRAVENOUS AS NEEDED
Status: DISCONTINUED | OUTPATIENT
Start: 2022-03-23 | End: 2022-03-23

## 2022-03-23 RX ORDER — ONDANSETRON 2 MG/ML
INJECTION INTRAMUSCULAR; INTRAVENOUS AS NEEDED
Status: DISCONTINUED | OUTPATIENT
Start: 2022-03-23 | End: 2022-03-23

## 2022-03-23 RX ORDER — ONDANSETRON 2 MG/ML
4 INJECTION INTRAMUSCULAR; INTRAVENOUS ONCE AS NEEDED
Status: DISCONTINUED | OUTPATIENT
Start: 2022-03-23 | End: 2022-03-23 | Stop reason: HOSPADM

## 2022-03-23 RX ORDER — CEFAZOLIN SODIUM 2 G/50ML
2000 SOLUTION INTRAVENOUS ONCE
Status: COMPLETED | OUTPATIENT
Start: 2022-03-23 | End: 2022-03-23

## 2022-03-23 RX ORDER — DEXAMETHASONE SODIUM PHOSPHATE 4 MG/ML
INJECTION, SOLUTION INTRA-ARTICULAR; INTRALESIONAL; INTRAMUSCULAR; INTRAVENOUS; SOFT TISSUE AS NEEDED
Status: DISCONTINUED | OUTPATIENT
Start: 2022-03-23 | End: 2022-03-23

## 2022-03-23 RX ADMIN — MIDAZOLAM 1 MG: 1 INJECTION INTRAMUSCULAR; INTRAVENOUS at 12:41

## 2022-03-23 RX ADMIN — FENTANYL CITRATE 50 MCG: 50 INJECTION INTRAMUSCULAR; INTRAVENOUS at 12:44

## 2022-03-23 RX ADMIN — CEFAZOLIN SODIUM 2000 MG: 2 SOLUTION INTRAVENOUS at 12:52

## 2022-03-23 RX ADMIN — LIDOCAINE HYDROCHLORIDE 100 MG: 10 INJECTION, SOLUTION EPIDURAL; INFILTRATION; INTRACAUDAL; PERINEURAL at 12:48

## 2022-03-23 RX ADMIN — MIDAZOLAM 1 MG: 1 INJECTION INTRAMUSCULAR; INTRAVENOUS at 12:44

## 2022-03-23 RX ADMIN — FENTANYL CITRATE 25 MCG: 50 INJECTION INTRAMUSCULAR; INTRAVENOUS at 13:07

## 2022-03-23 RX ADMIN — Medication 50 MCG: at 13:59

## 2022-03-23 RX ADMIN — SODIUM CHLORIDE, SODIUM LACTATE, POTASSIUM CHLORIDE, AND CALCIUM CHLORIDE: .6; .31; .03; .02 INJECTION, SOLUTION INTRAVENOUS at 12:34

## 2022-03-23 RX ADMIN — Medication 50 MCG: at 13:40

## 2022-03-23 RX ADMIN — PROPOFOL 200 MG: 10 INJECTION, EMULSION INTRAVENOUS at 12:48

## 2022-03-23 RX ADMIN — ONDANSETRON 4 MG: 2 INJECTION INTRAMUSCULAR; INTRAVENOUS at 13:06

## 2022-03-23 RX ADMIN — DEXAMETHASONE SODIUM PHOSPHATE 4 MG: 4 INJECTION INTRA-ARTICULAR; INTRALESIONAL; INTRAMUSCULAR; INTRAVENOUS; SOFT TISSUE at 13:06

## 2022-03-23 NOTE — H&P
Orthopaedics Office Visit - Follow up Patient Visit     ASSESSMENT/PLAN:     Assessment:   Status post ORIF right tibia shaft fracture     DOS 5/18/20  Painful orthopedic hardware - will plan to remove two distal bolts  Right greater trochanter bursitis        Plan:   - weightbear as tolerated right lower extremity   -  Surgery delayed secondary to positive COVID infection  - Discussed conservative and surgical intervention with patient at length  Patient opted for surgical intervention at this time   - Risks and benefits were discussed with patient at length  Procedure being performed:  Removal of hardware right lower extremity  informed consent was obtained at this time  - Patient referred to pain management for evaluation of hypersensitivity of the right lower extremity   - Over the counter analgesics as needed / directed   - Ice / heat as directed   - Patient will follow-up postoperatively for suture removal, x-ray        To Do Next Visit:  Sutures out, XR      _____________________________________________________  CHIEF COMPLAINT:      Chief Complaint   Patient presents with    Right Knee - Follow-up            SUBJECTIVE:  Estrellita Edmonds a 31 y  o  female who presents to the office today for a follow up evaluation of her right leg and for a discussion of her recent CT   She states that on 05/18/2020 while working at [employer], a carton of chalk fell on her   She states that 100 boxes fell on her and each box weighs 20 pounds   She was seen in the ED immediately after the injury occurred for an open right tibia shaft fracture she underwent surgery performed by Dr Mitch Marc, and roc and screws were placed   She states that she attended formal physical therapy but plateaued with her improvement   She states that she was never pain free after the surgery   She notes that she will experience stiffness into her ankle and discomfort where the screws are located   She states that she will experience daily intermittent in dull aching pain which is exacerbated with prolonged periods of standing and walking   She states that due to the pain she experiences she compensate when she walks and is now spirits and pain into her right hip   She states that she has not used any over-the-counter anti-inflammatories for pain relief  Ruben Monteiro states that she will ice her ankle on occasions   Patient offers no other complaints at this time          PAST MEDICAL HISTORY:  Medical History        Past Medical History:   Diagnosis Date    Abnormal Pap smear of cervix 02/2018     [facility]    Ankle pain      Anxiety      Asthma      Cervical lesion      Degeneration, intervertebral disc, lumbar      Depression      Fracture tibia/fibula       right    History of COVID-19 01/07/2022     started with symptoms per pt 1/2/22 postnasal drip and coughing/ 1/14 postnasal drips persists but better per pt    HPV (human papilloma virus) infection 02/2018     (+) type 16; negative type 18 and other HRHPV    Rosacea      Shortness of breath       "with asthma and change of seasons"    Work related injury 05/2020            PAST SURGICAL HISTORY:  Surgical History         Past Surgical History:   Procedure Laterality Date    CERVICAL BIOPSY  W/ LOOP ELECTRODE EXCISION   03/2018     [facility] N/A 6/21/2019     Procedure: CYSTOSCOPY;  Surgeon: Ewa Mata MD;  [facility];  Service: Gynecology    GANGLION CYST EXCISION        LAPAROSCOPIC VAGINAL HYSTERECTOMY        MS CONIZATION CERVIX,LOOP ELECTRD N/A 3/23/2018     Procedure: BIOPSY LEEP CERVIX;  Surgeon: Tamanna Ramos MD;  [facility];  Service: Gynecology    MS LAP,RMV  ADNEXAL STRUCTURE Bilateral 6/21/2019     Procedure: SALPINGECTOMY, LAPAROSCOPIC;  Surgeon: Ewa Mata MD;  [facility];  Service: Gynecology    MS LAP,VAG HYST,UTERUS 250GMS/<,SALP-OOPH N/A 6/21/2019     Procedure: HYSTERECTOMY LAPAROSCOPIC ASSISTED VAGINAL ([facility]); Surgeon: Anyi Bernard MD;  [facility];  Service: Gynecology    AZ TREAT TIBIAL Aparicio Wesley IMPLANT Right 5/18/2020     Procedure: INSERTION NAIL IM TIBIA;  Surgeon: Justin Castillo MD;  [facility];  Service: Orthopedics    RHINOPLASTY        SHOULDER SURGERY Right       anchor inserted    TOOTH EXTRACTION        WOUND DEBRIDEMENT Right 5/18/2020     Procedure: DEBRIDEMENT LOWER EXTREMITY ([address] OUT); Surgeon: Justin Castillo MD;  [facility];  Service: Orthopedics            FAMILY HISTORY:        Family History   Problem Relation Age of Onset    Cancer Mother      Melanoma Mother      Diabetes Father      Hypertension Father      Hyperlipidemia Brother      Ovarian cancer Paternal Grandmother      Cancer Paternal Uncle      Diabetes Family      Hypertension Family           SOCIAL HISTORY:  Social History            Tobacco Use    Smoking status: Never Smoker    Smokeless tobacco: Never Used   Vaping Use    Vaping Use: Never used   Substance Use Topics    Alcohol use: Yes       Comment: social    Drug use: Never         MEDICATIONS:     Current Outpatient Medications:     acetaminophen (TYLENOL) 325 mg tablet, Take 3 tablets (975 mg total) by mouth every 8 (eight) hours, Disp: 30 tablet, Rfl: 0    albuterol (PROVENTIL HFA,VENTOLIN HFA) 90 mcg/act inhaler, Inhale 2 puffs every 6 (six) hours as needed for wheezing, Disp: 18 g, Rfl: 5    fluticasone (FLONASE) 50 mcg/act nasal spray, 1 spray into each nostril daily as needed for allergies, Disp: 1 Bottle, Rfl: 5    Symbicort 160-4 5 MCG/ACT inhaler, Inhale 2 puffs 2 (two) times a day Rinse mouth after use , Disp: 10 2 g, Rfl: 5     ALLERGIES:        Allergies   Allergen Reactions    Cat Hair Extract Allergic Rhinitis    Pollen Extract Allergic Rhinitis    Nickel Itching       Redness/puffy to ears/ redness to skin if exposed to Nickel         REVIEW OF SYSTEMS:  MSK: right leg pain   Neuro:  WNL Pertinent items are otherwise noted in HPI  A comprehensive review of systems was otherwise negative      LABS:  HgA1c:         Lab Results   Component Value Date     HGBA1C 5 1 06/14/2019      BMP:         Lab Results   Component Value Date     GLUCOSE 92 05/18/2020     CALCIUM 9 4 01/03/2022      03/03/2015     K 3 8 01/03/2022     CO2 28 01/03/2022      01/03/2022     BUN 10 01/03/2022     CREATININE 0 68 01/03/2022      CBC: No components found for: CBC     _____________________________________________________  PHYSICAL EXAMINATION:  Ht 5' 8" (1 727 m)   Wt 76 2 kg (168 lb)   LMP 05/01/2019   BMI 25 54 kg/m²   General: No acute distress, awake and alert  Psychiatric: Mood and affect appear appropriate  HEENT: Trachea Midline, No torticollis, no apparent facial trauma  Cardiovascular: No audible murmurs; Extremities appear perfused  Pulmonary: No audible wheezing or stridor  Abdomen : present, soft   Skin: No open lesions; see further details (if any) below        MUSCULOSKELETAL EXAMINATION:  Right lower extremity examination:  - Patient sitting comfortably in the office in no acute distress   - healed incision sites noted over the knee, a distal tibia  No acute visible abnormalities present  - tenderness to palpation noted over the trochanteric bursa, midshaft tibia  Hypersensitivity noted over the medial distal ankle  - full range of motion of the right knee and ankle with no pain  - NV intact     _____________________________________________________  STUDIES REVIEWED:  I personally reviewed the images and interpretation is as follows:  CT lower extremity wo contrast right  Status post ORIF of mid to distal tibial shaft fracture which appears nearly completely healed clavicular is a visible cortical fracture line still seen posteromedially  Partially fused oblique fracture proximal 3rd of the fibular shaft        PROCEDURES PERFORMED:  No procedures were performed at this time          Rian Sesay PA-C - assisting     Leni Barrientos MD                          Portions of the record may have been created with voice recognition software   Occasional wrong word or "sound a like" substitutions may have occurred due to the inherent limitations of voice recognition software   Read the chart carefully and recognize, using context, where substitutions have occurred

## 2022-03-23 NOTE — ANESTHESIA POSTPROCEDURE EVALUATION
Post-Op Assessment Note    CV Status:  Stable  Pain Score: 4    Pain management: adequate     Mental Status:  Alert and awake   Hydration Status:  Euvolemic   PONV Controlled:  Controlled   Airway Patency:  Patent      Post Op Vitals Reviewed: Yes      Staff: CRNA         No complications documented      /63 (03/23/22 1336)    Temp (!) 97 3 °F (36 3 °C) (03/23/22 1336)    Pulse 71 (03/23/22 1336)   Resp 15 (03/23/22 1336)    SpO2   98 ra

## 2022-03-23 NOTE — DISCHARGE INSTRUCTIONS
Discharge Instructions - Orthopedics  Ayush Alfonso 31 y o  female MRN: [id number]  Unit/Bed#: Operating Room    Weight Bearing Status:                                           Weight bearing as tolerated right lower extremity     DVT prophylaxis  Aspirin 81mg twice daily x 4 weeks   IF  APPLICABLE  Please contact plastic surgeon prior to administration  Pain:  Continue analgesics as directed    Dressing Instructions: May remove dressings in 5 days  Okay To begin showering at that time  Allow water to flow over the incision sites  Do not vigorously scrubbed or soak wounds until otherwise stated     Appt Instructions: If you do not have your appointment, please call the clinic at [phone] t  Otherwise followup as scheduled     Contact the office sooner if you experience any increased numbness/tingling in the extremities

## 2022-03-23 NOTE — ANESTHESIA PREPROCEDURE EVALUATION
Procedure:  REMOVAL HARDWARE TIBIA (Right Leg Lower)    Relevant Problems   MUSCULOSKELETAL   (+) DDD (degenerative disc disease), cervical   (+) Disc degeneration, lumbar      NEURO/PSYCH   (+) PTSD (post-traumatic stress disorder)   (+) Panic disorder without agoraphobia   (+) Status post open reduction and internal fixation (ORIF) of fracture      PULMONARY   (+) Moderate persistent asthma without complication        Physical Exam    Airway    Mallampati score: I  TM Distance: >3 FB  Neck ROM: full     Dental       Cardiovascular      Pulmonary      Other Findings        Anesthesia Plan  ASA Score- 2     Anesthesia Type- general with ASA Monitors  Additional Monitors:   Airway Plan: ETT and LMA  Plan Factors-    Chart reviewed  EKG reviewed  Imaging results reviewed  Existing labs reviewed  Patient summary reviewed  Induction- intravenous  Postoperative Plan-     Informed Consent- Anesthetic plan and risks discussed with patient  I personally reviewed this patient with the CRNA  Discussed and agreed on the Anesthesia Plan with the CRNA  Joseph Villela

## 2022-03-24 NOTE — OP NOTE
OPERATIVE REPORT  PATIENT NAME: Missy Vergara    :  1985  MRN: [id number]  Pt Location: [facility] ROOM 04    SURGERY DATE: 3/23/2022    Surgeon(s) and Role:     * Montse Edmond MD - Primary     * Tamra Gomez PA-C - Assisting    Preop Diagnosis:  Symptomatic R tibial distal locking bolts    Post-Op Diagnosis Codes:  Same    Procedure(s) (LRB):  REMOVAL HARDWARE TIBIA (Right)    Procedure:  Removal of deep implant R tibia (CPT 59264)    Specimen(s):  * No specimens in log *    Estimated Blood Loss:   Minimal    Drains:  Closed/Suction Drain Left; Inferior Abdomen Bulb 15 Fr  (Active)   Number of days: 23       Anesthesia Type:   General    Operative Indications:  Symptomatic R distal tibia bolts    Operative Findings:  See below    Complications:   None    Procedure and Technique:  The patient's operative site, laterality, procedure, consent were verified in the preoperative area and the patient was transitioned to the operating room  General anesthesia was applied by the anesthesia team and the operative extremity was prepped and draped in the usual sterile fashion  After time-out for safety, old medial stab incisions were reopened with scalpel and hemostat was utilized to protect soft tissue structures  The 2 distal locking bolts were removed in entirety without difficulty  Fluoroscopic imaging revealed confirmation of removal of hardware  Hemostasis was obtained the wounds were copiously irrigated with sterile saline  Subcutaneous layer closure took place with Vicryl suture, skin layer closure took place with nylon suture  Sterile dressing consisted of Mepilex dressing, the patient was wrapped with Webril and Ace wrap  All final counts, including but not limited to instrument, sponge, needle counts were correct  I was present for the entire procedure  The patient was extubated and awakened and transitioned to the PACU in stable condition  There were no immediate complications      No qualified resident was available for the procedure  Critical assistance from Radha Munoz PA-C was required for all components of the procedure including but not limited to positioning, soft tissue retraction, passage of instrumentation, soft tissue closure  The patient will be weight-bearing as tolerated on the operative extremity  She will use aspirin for deep vein thrombosis prophylaxis  We will consider suture removal in 2 weeks      Patient Disposition:  PACU       SIGNATURE: Yeyo Fajardo MD  DATE: March 24, 2022  TIME: 4:20 PM

## 2022-03-28 ENCOUNTER — TELEPHONE (OUTPATIENT)
Dept: OBGYN CLINIC | Facility: HOSPITAL | Age: 37
End: 2022-03-28

## 2022-03-28 NOTE — TELEPHONE ENCOUNTER
Spoke to patient  Advised to remove all dressings and shower  Allow warm soapy water to run over the incision  Do not soak or expose to prolonged moisture  Pat well to dry and recover if needed  Understanding verbalized  No further questions

## 2022-03-28 NOTE — TELEPHONE ENCOUNTER
Pt sees Dr Isiah Ramos    Pt is calling stating that she had surgery on Wednesday   Pt would like to know if she should take  the wrap and the bandage off or just the wrap    #  [phone]

## 2022-04-01 ENCOUNTER — OFFICE VISIT (OUTPATIENT)
Dept: OBGYN CLINIC | Facility: HOSPITAL | Age: 37
End: 2022-04-01

## 2022-04-01 VITALS
WEIGHT: 170 LBS | SYSTOLIC BLOOD PRESSURE: 106 MMHG | DIASTOLIC BLOOD PRESSURE: 68 MMHG | BODY MASS INDEX: 25.76 KG/M2 | HEIGHT: 68 IN | HEART RATE: 87 BPM

## 2022-04-01 DIAGNOSIS — T84.84XA PAINFUL ORTHOPAEDIC HARDWARE (HCC): Primary | ICD-10-CM

## 2022-04-01 DIAGNOSIS — Z98.890 HISTORY OF REMOVAL OF RETAINED HARDWARE: ICD-10-CM

## 2022-04-01 PROCEDURE — 99024 POSTOP FOLLOW-UP VISIT: CPT | Performed by: ORTHOPAEDIC SURGERY

## 2022-04-01 NOTE — PROGRESS NOTES
Orthopaedics Office Visit - Post op Patient Visit    ASSESSMENT/PLAN:    Assessment:   9 days s/p removal of hardware right ankle performed on 3/23/2022    Plan:   Sutures were removed today  Steri strips were applied  Will be provided with a referral to physical therapy for gentle ROM and strengthening exercises as tolerated  May be WBAT on right lower extremity  Instructed the patient that as she is moving to [place] in about 3 weeks that if she has an issues to make an appointment with a trauma surgeon  She was told to obtain all of her medical records and diagnostic studies before moving  She understood and all questions were answered  To Do Next Visit:  PRN    _____________________________________________________  CHIEF COMPLAINT:  Chief Complaint   Patient presents with    Right Leg - Post-op         SUBJECTIVE:  Alex Simpson is a 31 y o  female who presents today 9 days s/p removal of orthopedic hardware of the right ankle performed on 3/23/2022  Patient reports that her pain is well controlled  She only notes a mild soreness about the medial ankle, especially with weight bearing activities  She states that she is moving to [place] in 3 weeks and will possibly follow up with a doctor down there if there are any issues  No numbness or tingling  No fevers or chills       PAST MEDICAL HISTORY:  Past Medical History:   Diagnosis Date    Abnormal Pap smear of cervix 02/2018    [facility]    Ankle pain     Anxiety     Asthma     controlled    Cervical lesion     Degeneration, intervertebral disc, lumbar     Depression     Fracture tibia/fibula     right    History of COVID-19 01/07/2022    started with symptoms per pt 1/2/22 postnasal drip and coughing/ 1/14 postnasal drips persists but better per pt    HPV (human papilloma virus) infection 02/2018    (+) type 16; negative type 18 and other HRHPV    Rosacea     Work related injury 05/2020       PAST SURGICAL HISTORY:  Past Surgical History: Procedure Laterality Date    ABDOMINOPLASTY N/A 3/1/2022    Procedure: ABDOMINOPLASTY;  Surgeon: Neha Brenner MD;  Location: [facility] MAIN OR;  Service: Plastics    CERVICAL BIOPSY  W/ LOOP ELECTRODE EXCISION  03/2018    [facility] Dr Maryann Conti N/A 6/21/2019    Procedure: Alejandro Cisneros;  Surgeon: Lynne Rain MD;  [facility];  Service: Gynecology    GANGLION CYST EXCISION      HAND SURGERY      HYSTERECTOMY      partial    LAPAROSCOPIC VAGINAL HYSTERECTOMY      [place] CONIZATION CERVIX,LOOP ELECTRD N/A 3/23/2018    Procedure: BIOPSY LEEP CERVIX;  Surgeon: Kalyani Wagner MD;  [facility];  Service: Gynecology    [place] LAP,RMV  ADNEXAL STRUCTURE Bilateral 6/21/2019    Procedure: SALPINGECTOMY, LAPAROSCOPIC;  Surgeon: Lynne Rain MD;  [facility];  Service: Gynecology    [place] LAP,VAG HYST,UTERUS 250GMS/<,SALP-OOPH N/A 6/21/2019    Procedure: HYSTERECTOMY LAPAROSCOPIC ASSISTED VAGINAL ([facility]); Surgeon: Lynne Rain MD;  [facility];  Service: Gynecology    [place] SUCT NAHOMY [address] Bilateral 3/1/2022    Procedure: LIPOSUCTION OF THIGH;  Surgeon: Neha Brenner MD;  Location: [facility] MAIN OR;  Service: [address] [facility] SHAFT FX, INTRAMED IMPLANT Right 5/18/2020    Procedure: INSERTION NAIL IM TIBIA;  Surgeon: Mike Mckeon MD;  [facility];  Service: Orthopedics    RHINOPLASTY      SHOULDER SURGERY Right     anchor inserted    TIBIA HARDWARE REMOVAL Right 3/23/2022    Procedure: REMOVAL HARDWARE TIBIA;  Surgeon: Rosmery Ruby MD;  [facility];  Service: Orthopedics    TOOTH EXTRACTION      WOUND DEBRIDEMENT Right 5/18/2020    Procedure: DEBRIDEMENT LOWER EXTREMITY [facility] OUT);   Surgeon: Mike Mckeon MD;  [facility];  Service: Orthopedics       FAMILY HISTORY:  Family History   Problem Relation Age of Onset    Cancer Mother     Melanoma Mother     Diabetes Father     Hypertension Father     Hyperlipidemia Brother     Ovarian cancer Paternal Grandmother     Cancer Paternal Uncle     Diabetes Family     Hypertension Family        SOCIAL HISTORY:  Social History     Tobacco Use    Smoking status: Never Smoker    Smokeless tobacco: Never Used   Vaping Use    Vaping Use: Never used   Substance Use Topics    Alcohol use: Yes     Comment: social    Drug use: Never       MEDICATIONS:    Current Outpatient Medications:     acetaminophen (TYLENOL) 325 mg tablet, Take 3 tablets (975 mg total) by mouth every 8 (eight) hours, Disp: 30 tablet, Rfl: 0    albuterol (PROVENTIL HFA,VENTOLIN HFA) 90 mcg/act inhaler, Inhale 2 puffs every 6 (six) hours as needed for wheezing, Disp: 18 g, Rfl: 5    fluticasone (FLONASE) 50 mcg/act nasal spray, 1 spray into each nostril daily as needed for allergies, Disp: 1 Bottle, Rfl: 5    oxyCODONE (Roxicodone) 5 immediate release tablet, Take 1 tablet (5 mg total) by mouth every 6 (six) hours as needed for moderate pain for up to 10 days Max Daily Amount: 20 mg, Disp: 10 tablet, Rfl: 0    Symbicort 160-4 5 MCG/ACT inhaler, Inhale 2 puffs 2 (two) times a day Rinse mouth after use , Disp: 10 2 g, Rfl: 5    ALLERGIES:  Allergies   Allergen Reactions    Cat Hair Extract Allergic Rhinitis    Pollen Extract Allergic Rhinitis    Nickel Itching     Redness/puffy to ears/ redness to skin if exposed to Nickel       REVIEW OF SYSTEMS:  MSK: Right ankle pain  Neuro: WNL  Pertinent items are otherwise noted in HPI  A comprehensive review of systems was otherwise negative      LABS:  HgA1c:   Lab Results   Component Value Date    HGBA1C 5 1 06/14/2019     BMP:   Lab Results   Component Value Date    GLUCOSE 92 05/18/2020    CALCIUM 9 4 01/03/2022     03/03/2015    K 3 8 01/03/2022    CO2 28 01/03/2022     01/03/2022    BUN 10 01/03/2022    CREATININE 0 68 01/03/2022     CBC: No components found for: CBC    _____________________________________________________  PHYSICAL EXAMINATION:  Vital signs: /68   Pulse 87   Ht 5' 8" (1 727 m)   Wt 77 1 kg (170 lb)   LMP 05/01/2019   BMI 25 85 kg/m²   General: No acute distress, awake and alert  Psychiatric: Mood and affect appear appropriate  HEENT: Trachea Midline, No torticollis, no apparent facial trauma  Cardiovascular: No audible murmurs; Extremities appear perfused  Pulmonary: No audible wheezing or stridor  Skin: No open lesions; see further details (if any) below    MUSCULOSKELETAL EXAMINATION:  Extremities:  Right Ankle  Skin is intact  Incision is C/D/I, no signs of infection  Mild tenderness to palpation about the medial ankle  ROM is intact with mild pain in all planes  Sensation is intact distally  Brisk capillary refill  2+ DP Pulse   Calf is non tender        _____________________________________________________  STUDIES REVIEWED:  I personally reviewed the images and interpretation is as follows:    None performed today    PROCEDURES PERFORMED:  Procedures     None performed today    Scribe Attestation    I,:  Maribel Bosoledad am acting as a scribe while in the presence of the attending physician :       I,:  Gautam Tony MD personally performed the services described in this documentation    as scribed in my presence :

## 2022-04-04 NOTE — PROGRESS NOTES
PT Evaluation     Today's date: 2022  Patient name: Gunner Ritchie  : 1985  MRN: [id number]  Referring provider: Nancy Galeano MD  Dx:   Encounter Diagnosis     ICD-10-CM    1  Painful orthopaedic hardware [facility])  T84 84XA Ambulatory Referral to Physical Therapy   2  History of removal of retained hardware  Z98 [id number] Ambulatory Referral to Physical Therapy                  Assessment  Assessment details: Gunner Ritchie is a 31 y o  female who is s/p hardware removal on right ankle on 3/23/22  She is currently WBAT on R LE  She demonstrates abnormal gait, decreased LE strength and flexibility, decreased ROM and strength, decreased balance and decreased function  Patient would benefit from skilled physical therapy in order to address said deficits and improve functional mobility  Understanding of Dx/Px/POC: good   Prognosis: good  Prognosis details: Patient moves in ~ 3 weeks to [facility]    Goals  STG:  Decrease pain 1 grade  Increase ROM > 25%   Independent with basic HEP    LTG:  Decrease pain 2 grades  Increase ROM 50%   Increase FOTO to expected score   Normalize gait     Plan  Patient would benefit from: skilled physical therapy  Planned therapy interventions: abdominal trunk stabilization, activity modification, joint mobilization, manual therapy, massage, neuromuscular re-education, balance, patient education, stretching, strengthening, therapeutic activities, therapeutic exercise, flexibility, functional ROM exercises, gait training and home exercise program  Frequency: 2x week  Duration in weeks: 6  Treatment plan discussed with: patient        Subjective Evaluation    History of Present Illness  Mechanism of injury: Patient s/p removal of orthopedic hardware of the right ankle performed on 3/23/2022  Patient reports that her pain is well controlled  She only notes a mild soreness about the medial ankle, especially with weight bearing activities   She states that she is moving to [place] on  and will possibly follow up with a doctor down there if there are any issues  No numbness or tingling  No fevers or chills  Difficulty walking, balancing, negotiating stairs  Pt is a  though is not working 2/2 tummy tuck performed a few weeks ago  Still wearing abdominal binder           Pain  Current pain rating: 3  At worst pain ratin    Patient Goals  Patient goal: walk better/straighter         Objective     Active Range of Motion   Left Ankle/Foot   Dorsiflexion (kf): 2 degrees   Plantar flexion: 60 degrees   Inversion: 42 degrees   Eversion: 54 degrees     Right Ankle/Foot   Dorsiflexion (kf): -4 degrees   Plantar flexion: 62 degrees   Inversion: 34 degrees   Eversion: 26 degrees     Additional Active Range of Motion Details  Incisions clean dry and healing well     Strength/Myotome Testing     Right Ankle/Foot   Dorsiflexion: 3+  Plantar flexion: 3+  Inversion: 3+  Eversion: 3+             Precautions:  gentle ROM and strengthening exercises as tolerated   May be WBAT on right lower extremity        Manuals 4/5            Ankle  stretching NV            Posterior talar mobes gentle NV                                      Neuro Re-Ed             SLS NV            Steamboats    NV          biodex LOS NV                                                                Ther Ex             Bike/TM NV            Ankle TB 4 way  R TB 2x10             gastroc Sb stretch Towel  20" x3            Soleus SB stretch towel            Plantar fascia towel stretch  20"x3            Towel curls  NV            Heel/toe raises  NV                         Ther Activity             Step up and overs  NV                         Gait Training             Weight shifting fwd/back, side/side NV                         Modalities

## 2022-04-05 ENCOUNTER — EVALUATION (OUTPATIENT)
Dept: PHYSICAL THERAPY | Facility: CLINIC | Age: 37
End: 2022-04-05
Payer: COMMERCIAL

## 2022-04-05 DIAGNOSIS — Z98.890 HISTORY OF REMOVAL OF RETAINED HARDWARE: ICD-10-CM

## 2022-04-05 DIAGNOSIS — T84.84XA PAINFUL ORTHOPAEDIC HARDWARE (HCC): Primary | ICD-10-CM

## 2022-04-05 PROCEDURE — 97110 THERAPEUTIC EXERCISES: CPT | Performed by: PHYSICAL THERAPIST

## 2022-04-05 PROCEDURE — 97161 PT EVAL LOW COMPLEX 20 MIN: CPT | Performed by: PHYSICAL THERAPIST

## 2022-04-07 ENCOUNTER — OFFICE VISIT (OUTPATIENT)
Dept: PHYSICAL THERAPY | Facility: CLINIC | Age: 37
End: 2022-04-07
Payer: COMMERCIAL

## 2022-04-07 DIAGNOSIS — T84.84XA PAINFUL ORTHOPAEDIC HARDWARE (HCC): Primary | ICD-10-CM

## 2022-04-07 DIAGNOSIS — Z98.890 HISTORY OF REMOVAL OF RETAINED HARDWARE: ICD-10-CM

## 2022-04-07 PROCEDURE — 97530 THERAPEUTIC ACTIVITIES: CPT

## 2022-04-07 PROCEDURE — 97112 NEUROMUSCULAR REEDUCATION: CPT

## 2022-04-07 PROCEDURE — 97110 THERAPEUTIC EXERCISES: CPT

## 2022-04-07 PROCEDURE — 97140 MANUAL THERAPY 1/> REGIONS: CPT

## 2022-04-07 NOTE — PROGRESS NOTES
Daily Note     Today's date: 2022  Patient name: Brendon Groves  : 1985  MRN: [id number]  Referring provider: Dorothy Dumont MD  Dx:   Encounter Diagnosis     ICD-10-CM    1  Painful orthopaedic hardware ([address] )  T84 84XA    2  History of removal of retained hardware  Z98 [id number]                   Subjective: Pt rates ankle soreness 3-4/10 today  Reports the ankle hurts w/ certain motions, shelbie into eversion  Pt c/o shin discomfort today, whereas yesterday she had no discomfort  Objective: See treatment diary below      Assessment: Tolerated treatment well  Patient would benefit from continued PT  Increased discomfort w/ standing HR as reps progress  No increased discomfort noted w/ step up and overs  No discomfort noted w/ manual ankle stretching  Plan: Continue per plan of care  Precautions:  gentle ROM and strengthening exercises as tolerated   May be WBAT on right lower extremity        Manuals            R Ankle  stretching NV 10'           Posterior talar mobes gentle NV NV                                     Neuro Re-Ed             SLS NV NV           Steamboats    NV          biodex LOS NV x2 small best 45%                                                               Ther Ex             Bike/TM NV 5'           Ankle TB 4 way  R TB 2x10  RTB 2x10 ea           gastroc Sb stretch Towel  20" x3 SB 20"x3           Soleus SB stretch towel            Plantar fascia towel stretch  20"x3 20"x3           Towel curls  NV 2 min           Heel/toe raises  NV 20 HR stand; TR seated x20                        Ther Activity             Step up and overs  NV 4" 2x10                        Gait Training             Weight shifting fwd/back, side/side NV 10x3" ea                        Modalities

## 2022-04-11 ENCOUNTER — OFFICE VISIT (OUTPATIENT)
Dept: PHYSICAL THERAPY | Facility: CLINIC | Age: 37
End: 2022-04-11
Payer: COMMERCIAL

## 2022-04-11 DIAGNOSIS — T84.84XA PAINFUL ORTHOPAEDIC HARDWARE (HCC): Primary | ICD-10-CM

## 2022-04-11 DIAGNOSIS — Z98.890 HISTORY OF REMOVAL OF RETAINED HARDWARE: ICD-10-CM

## 2022-04-11 PROCEDURE — 97140 MANUAL THERAPY 1/> REGIONS: CPT | Performed by: PHYSICAL THERAPIST

## 2022-04-11 PROCEDURE — 97530 THERAPEUTIC ACTIVITIES: CPT | Performed by: PHYSICAL THERAPIST

## 2022-04-11 PROCEDURE — 97112 NEUROMUSCULAR REEDUCATION: CPT | Performed by: PHYSICAL THERAPIST

## 2022-04-11 PROCEDURE — 97110 THERAPEUTIC EXERCISES: CPT | Performed by: PHYSICAL THERAPIST

## 2022-04-11 NOTE — PROGRESS NOTES
Daily Note     Today's date: 2022  Patient name: Cris Randolph  : 1985  MRN: [id number]  Referring provider: Torie Batista MD  Dx:   Encounter Diagnosis     ICD-10-CM    1  Painful orthopaedic hardware ([address] )  T84 84XA    2  History of removal of retained hardware  Z98 [id number]                   Subjective: Patient states she is very sore today after doing a lot of moving/yardsale yesterday and being on her feel for a long time  She notes     Objective: See treatment diary below      Assessment: Tolerated treatment well  Patient would benefit from continued PT  Patient generally sore throughout treatment session today though is able to complete all exercises  She has tenderness with all exercises though good tolerance, modifications made with soleus stretch due to anterior talar soreness which was improved with lowering down on SB  No complaints with added talar mobes with fair mobility  Plan: Continue per plan of care  Precautions:  gentle ROM and strengthening exercises as tolerated   May be WBAT on right lower extremity        Manuals           R Ankle  stretching NV 10' 8'          Posterior talar mobes gentle NV NV 2'                                    Neuro Re-Ed             SLS NV NV 15" x3 Green foam          Steamboats    NV NV         biodex LOS NV x2 small best 45% 56% best x2                                                              Ther Ex             Bike/TM NV 5' 5'          Ankle TB 4 way  R TB 2x10  RTB 2x10 ea HEP           gastroc Sb stretch Towel  20" x3 SB 20"x3 SB 20"X3          Soleus SB stretch towel  Sb 20"X3          Plantar fascia towel stretch  20"x3 20"x3           Towel curls  NV 2 min           Heel/toe raises  NV 20 HR stand; TR seated x20 20x ea                        Ther Activity             Step up and overs  NV 4" 2x10 4" x10  6" x10                       Gait Training             Weight shifting fwd/back, side/side NV 10x3" ea Modalities

## 2022-04-12 ENCOUNTER — ANNUAL EXAM (OUTPATIENT)
Dept: OBGYN CLINIC | Facility: CLINIC | Age: 37
End: 2022-04-12
Payer: COMMERCIAL

## 2022-04-12 VITALS
WEIGHT: 170 LBS | DIASTOLIC BLOOD PRESSURE: 74 MMHG | SYSTOLIC BLOOD PRESSURE: 116 MMHG | BODY MASS INDEX: 25.76 KG/M2 | HEIGHT: 68 IN

## 2022-04-12 DIAGNOSIS — Z01.419 ENCOUNTER FOR GYNECOLOGICAL EXAMINATION (GENERAL) (ROUTINE) WITHOUT ABNORMAL FINDINGS: Primary | ICD-10-CM

## 2022-04-12 PROCEDURE — 99395 PREV VISIT EST AGE 18-39: CPT | Performed by: PHYSICIAN ASSISTANT

## 2022-04-12 PROCEDURE — 0503F POSTPARTUM CARE VISIT: CPT | Performed by: PHYSICIAN ASSISTANT

## 2022-04-12 PROCEDURE — G0145 SCR C/V CYTO,THINLAYER,RESCR: HCPCS | Performed by: PHYSICIAN ASSISTANT

## 2022-04-12 NOTE — PROGRESS NOTES
Assessment/Plan   Diagnoses and all orders for this visit:    Encounter for gynecological examination (general) (routine) without abnormal findings  -     Liquid-based pap, screening    The current ASCCP guidelines were reviewed  Patient's last pap was 3/23/21 - WNL (-) HRHPV type 16/18 neg (+) BV and therefore, a pap with HPV cotesting is not indicated at this time  I emphasized the importance of an annual pelvic and breast exam  Patient opts to have a pap done today  Discussion  I have discussed the importance of monthly self-breast exams, exercise and healthy diet as well as adequate intake of calcium and vitamin D  Encourage MVI q day and r/mare importance of folic acid; Encourage 30-40 min weight bearing exercise most days of week  Encourage safe sexual practices; STI testing - declines  Contraception - none - s/p LAVH bilateral salpingectomy  Breast cancer screening is not indicated at this time  The patient has had the Gardasil vaccine series, which is recommended for patients from 545 years of age  All questions have been answered to her satisfaction  RTO for APE or sooner if needed    Subjective     HPI   Brendon Groves is a 31 y o  female who presents for annual well woman exam  Patient will be moving to [facility] area) in the next 2 weeks so will need to get all new doctors  LMP - 5/1/19 - s/p LAVH bilateral salpingectomy for persistent cervical dysplasia - pathology ERNIE 2-3; Denies any vaginal bleeding; 9/13/21 saw Dr Sp Mac for pelvic pressure and dyspareunia - referred to pelvic floor PT and pelvic ultrasound ordered - done 9/14/21 - vaginal cuff and ovaries WNL;  No vulvar itch/burn; No vaginal itch/burn; No abn discharge or odor;  No urinary sx - burning/pain/frequency/hematuria  (+) SBEs - no breast masses, asymmetry, nipple discharge or bleeding, changes in skin of breast, or breast tenderness bilaterally  No abd/pelvic pain or HAs;   Pt is sexually active in a mutually monog sexual relationship x 1 5 years; No issues with intercourse; She declines sti/hiv/hep testing; Feels safe at home  Current contraception: none - s/p LAVH bilateral salpingectomy  Gardasil - completed x 3  (+) PCP for routine Bw/care; Last Pap - 3/23/21 - WNL (-) HRHPV type 16/18 neg (+) BV  History of abnormal Pap smear: yes h/o ERNIE 2-3 s/p LAVH bilateral salpingectomy  Last mammo - 3/30/21 - B/L diagnostic mammogram and right breast ultrasound for right breast pain - Birads 1 neg - bilateral screening at age 38  Last STI screen - 23 - C/G neg; 20 STI labs WNL    Review of Systems   Constitutional: Negative for activity change, fatigue, fever and unexpected weight change  HENT: Negative for congestion, dental problem, sinus pressure and sinus pain  Eyes: Negative for visual disturbance  Respiratory: Negative for cough, shortness of breath and wheezing  Cardiovascular: Negative for chest pain and leg swelling  Gastrointestinal: Negative for abdominal distention, abdominal pain, blood in stool, constipation, diarrhea, nausea and vomiting  Endocrine: Negative for polydipsia  Genitourinary: Negative for difficulty urinating, dyspareunia, dysuria, frequency, hematuria, menstrual problem, pelvic pain, urgency, vaginal bleeding, vaginal discharge and vaginal pain  Musculoskeletal: Negative for arthralgias and back pain  Allergic/Immunologic: Negative for environmental allergies  Neurological: Negative for dizziness, seizures and headaches  Psychiatric/Behavioral: Negative for dysphoric mood and sleep disturbance  The patient is not nervous/anxious          The following portions of the patient's history were reviewed and updated as appropriate: allergies, current medications, past family history, past medical history, past social history, past surgical history and problem list          OB History        0    Para   0    Term   0       0    AB   0    Living   0       SAB   0 IAB   0    Ectopic   0    Multiple   0    Live Births   0                 Past Medical History:   Diagnosis Date    Abnormal Pap smear of cervix 02/2018    [facility]    Ankle pain     Anxiety     Asthma     controlled    Cervical dysplasia 2019    s/p LAVH B/L salpingectomy due to persistent cervical dysplasia - pathology ERNIE 2-3    Cervical lesion     Degeneration, intervertebral disc, lumbar     Depression     Fracture tibia/fibula     right    History of COVID-19 01/07/2022    started with symptoms per pt 1/2/22 postnasal drip and coughing/ 1/14 postnasal drips persists but better per pt    HPV (human papilloma virus) infection 02/2018    (+) type 16; negative type 18 and other HRHPV    Rosacea     Work related injury 05/2020       Past Surgical History:   Procedure Laterality Date    ABDOMINOPLASTY N/A 3/1/2022    Procedure: ABDOMINOPLASTY;  Surgeon: Jan Walker MD;  Location:  MAIN OR;  Service: Plastics    CERVICAL BIOPSY  W/ LOOP ELECTRODE EXCISION  03/2018    [facility]  Seleta Money N/A 6/21/2019    Procedure: Reji Crew;  Surgeon: Madhu Castro MD;  Location:  MAIN OR;  Service: Gynecology    GANGLION CYST EXCISION      HAND SURGERY      HYSTERECTOMY      partial    LAPAROSCOPIC VAGINAL HYSTERECTOMY      NH CONIZATION CERVIX,LOOP ELECTRD N/A 3/23/2018    Procedure: BIOPSY LEEP CERVIX;  Surgeon: Nicholas Hein MD;  Location:  MAIN OR;  Service: Gynecology    NH LAP,RMV  ADNEXAL STRUCTURE Bilateral 6/21/2019    Procedure: SALPINGECTOMY, LAPAROSCOPIC;  Surgeon: Madhu Castro MD;  Location:  MAIN OR;  Service: Gynecology    NH LAP,VAG HYST,UTERUS 250GMS/<,SALP-OOPH N/A 6/21/2019    Procedure: HYSTERECTOMY LAPAROSCOPIC ASSISTED VAGINAL (LAVH);   Surgeon: Madhu Castro MD;  Location:  MAIN OR;  Service: Gynecology    NH SUCT NAHOMY LIPECTOMY,LOW EXTREM Bilateral 3/1/2022    Procedure: LIPOSUCTION OF THIGH;  Surgeon: Jan Walker MD;  Location: [address] [place] [address] MAIN OR; Service: Plastics    OK TREAT TIBIAL SHAFT FX, INTRAMED IMPLANT Right 5/18/2020    Procedure: INSERTION NAIL IM TIBIA;  Surgeon: Ariana Ramos MD;  Location: [facility] MAIN [facility];  Service: Orthopedics    RHINOPLASTY      SHOULDER SURGERY Right     anchor inserted    TIBIA HARDWARE REMOVAL Right 3/23/2022    Procedure: REMOVAL HARDWARE TIBIA;  Surgeon: Jarred Arteaga MD;  Location: [facility] MAIN [facility];  Service: Orthopedics    TOOTH EXTRACTION      WOUND DEBRIDEMENT Right 5/18/2020    Procedure: DEBRIDEMENT LOWER EXTREMITY [facility] OUT);   Surgeon: Ariana Ramos MD;  Location: [facility] MAIN [facility];  Service: Orthopedics       Family History   Problem Relation Age of Onset    Cancer Mother     Melanoma Mother     Diabetes Father     Hypertension Father     Hyperlipidemia Brother     Ovarian cancer Paternal Grandmother     Cancer Paternal Uncle     Diabetes Family     Hypertension Family        Social History     Socioeconomic History    Marital status:      Spouse name: Not on file    Number of children: Not on file    Years of education: Not on file    Highest education level: Not on file   Occupational History    Not on file   Tobacco Use    Smoking status: Never Smoker    Smokeless tobacco: Never Used   Vaping Use    Vaping Use: Never used   Substance and Sexual Activity    Alcohol use: Yes     Comment: social    Drug use: Never    Sexual activity: Yes     Partners: Male     Birth control/protection: Female Sterilization     Comment: defer   Other Topics Concern    Not on file   Social History Narrative    ** Merged History Encounter **          Social Determinants of Health     Financial Resource Strain: Not on file   Food Insecurity: Not on file   Transportation Needs: Not on file   Physical Activity: Not on file   Stress: Not on file   Social Connections: Not on file   Intimate Partner Violence: Not on file   Housing Stability: Not on file         Current Outpatient Medications:     acetaminophen (TYLENOL) 325 mg tablet, Take 3 tablets (975 mg total) by mouth every 8 (eight) hours, Disp: 30 tablet, Rfl: 0    albuterol (PROVENTIL HFA,VENTOLIN HFA) 90 mcg/act inhaler, Inhale 2 puffs every 6 (six) hours as needed for wheezing, Disp: 18 g, Rfl: 5    fluticasone (FLONASE) 50 mcg/act nasal spray, 1 spray into each nostril daily as needed for allergies, Disp: 1 Bottle, Rfl: 5    Symbicort 160-4 5 MCG/ACT inhaler, Inhale 2 puffs 2 (two) times a day Rinse mouth after use , Disp: 10 2 g, Rfl: 5    Allergies   Allergen Reactions    Cat Hair Extract Allergic Rhinitis    Pollen Extract Allergic Rhinitis    Nickel Itching     Redness/puffy to ears/ redness to skin if exposed to Nickel       Objective   Vitals:    04/12/22 1137   BP: 116/74   Weight: 77 1 kg (170 lb)   Height: 5' 8" (1 727 m)     Physical Exam  Vitals reviewed  Constitutional:       General: She is awake  She is not in acute distress  Appearance: Normal appearance  She is well-developed, well-groomed and normal weight  She is not ill-appearing, toxic-appearing or diaphoretic  HENT:      Head: Normocephalic and atraumatic  Eyes:      Conjunctiva/sclera: Conjunctivae normal    Neck:      Thyroid: Thyromegaly present  No thyroid mass or thyroid tenderness  Comments: History of goiter - [facility] 2018 and one ordered in 2020 that was never done - encourage follow-up with PCP/endocrine/MD who followed up with her in the past  Cardiovascular:      Rate and Rhythm: Normal rate and regular rhythm  Heart sounds: Normal heart sounds  No murmur heard  Pulmonary:      Effort: Pulmonary effort is normal  No tachypnea, bradypnea or respiratory distress  Breath sounds: Normal breath sounds  No stridor or decreased air movement  No wheezing     Chest:   Breasts: Breasts are symmetrical       Right: Normal  No swelling, bleeding, inverted nipple, mass, nipple discharge, skin change, tenderness, axillary adenopathy or supraclavicular adenopathy  Left: Normal  No swelling, bleeding, inverted nipple, mass, nipple discharge, skin change, tenderness, axillary adenopathy or supraclavicular adenopathy  Abdominal:      General: There is no distension  Palpations: Abdomen is soft  There is no mass  Tenderness: There is abdominal tenderness (s/p recent abdominoplasty/tummy tuck)  Hernia: No hernia is present  There is no hernia in the left inguinal area or right inguinal area  Genitourinary:     General: Normal vulva  Exam position: Supine  Pubic Area: No rash or pubic lice  Labia:         Right: No rash, tenderness, lesion or injury  Left: No rash, tenderness, lesion or injury  Urethra: No prolapse, urethral pain, urethral swelling or urethral lesion  Vagina: Normal  No signs of injury and foreign body  No vaginal discharge, erythema, tenderness, bleeding, lesions or prolapsed vaginal walls  Uterus: Absent  Adnexa: Right adnexa normal and left adnexa normal         Right: No mass, tenderness or fullness  Left: No mass, tenderness or fullness  Comments: Uterus, cervix, and fallopian tubes surgically absent; B/L ovaries still present    Lymphadenopathy:      Cervical: No cervical adenopathy  Upper Body:      Right upper body: No supraclavicular or axillary adenopathy  Left upper body: No supraclavicular or axillary adenopathy  Lower Body: No right inguinal adenopathy  No left inguinal adenopathy  Skin:     General: Skin is warm and dry  Neurological:      Mental Status: She is alert and oriented to person, place, and time  Psychiatric:         Mood and Affect: Mood and affect normal          Speech: Speech normal          Behavior: Behavior normal  Behavior is cooperative  Thought Content:  Thought content normal          Judgment: Judgment normal

## 2022-04-12 NOTE — ASSESSMENT & PLAN NOTE
The current ASCCP guidelines were reviewed  Patient's last pap was 3/23/21 - WNL (-) HRHPV type 16/18 neg (+) BV and therefore, a pap with HPV cotesting is not indicated at this time  I emphasized the importance of an annual pelvic and breast exam  Patient opts to have a pap done today

## 2022-04-13 ENCOUNTER — OFFICE VISIT (OUTPATIENT)
Dept: PHYSICAL THERAPY | Facility: CLINIC | Age: 37
End: 2022-04-13
Payer: COMMERCIAL

## 2022-04-13 DIAGNOSIS — T84.84XA PAINFUL ORTHOPAEDIC HARDWARE (HCC): Primary | ICD-10-CM

## 2022-04-13 DIAGNOSIS — Z98.890 HISTORY OF REMOVAL OF RETAINED HARDWARE: ICD-10-CM

## 2022-04-13 PROCEDURE — 97530 THERAPEUTIC ACTIVITIES: CPT | Performed by: PHYSICAL THERAPIST

## 2022-04-13 PROCEDURE — 97112 NEUROMUSCULAR REEDUCATION: CPT | Performed by: PHYSICAL THERAPIST

## 2022-04-13 PROCEDURE — 97140 MANUAL THERAPY 1/> REGIONS: CPT | Performed by: PHYSICAL THERAPIST

## 2022-04-13 PROCEDURE — 97110 THERAPEUTIC EXERCISES: CPT | Performed by: PHYSICAL THERAPIST

## 2022-04-13 NOTE — PROGRESS NOTES
Daily Note     Today's date: 2022  Patient name: Janae Staples  : 1985  MRN: [id number]  Referring provider: Ramo Pozo MD  Dx:   Encounter Diagnosis     ICD-10-CM    1  Painful orthopaedic hardware ([address] )  T84 84XA    2  History of removal of retained hardware  Z98 890                   Subjective: Patient states she was not very sore after last session and has slightly improved pain today  Objective: See treatment diary below      Assessment: Tolerated treatment well  Patient would benefit from continued PT  Patient tolerates added exercises well  Mild anterior hip/stomach soreness with steamboats today due to tummy tuck recently  She has mild tenderness with joint mobes today though tolerates well  No noted increase in pain post session  Plan: Continue per plan of care  Precautions:  gentle ROM and strengthening exercises as tolerated   May be WBAT on right lower extremity        Manuals          R Ankle  stretching NV 10' 8' 8'         Posterior talar mobes gentle NV NV 2' 2'                                   Neuro Re-Ed             SLS NV NV 15" x3 Green foam 15" x3          Steamboats    NV Yellow loop 3 way x 10 bl          biodex LOS NV x2 small best 45% 56% best x2 76% best x 2 medium                                                              Ther Ex             Bike/TM NV 5' 5' 5'         Ankle TB 4 way  R TB 2x10  RTB 2x10 ea HEP           gastroc Sb stretch Towel  20" x3 SB 20"x3 SB 20"X3 Sb 20" x3         Soleus SB stretch towel  Sb 20"X3 sb 20"X3          Plantar fascia towel stretch  20"x3 20"x3           Towel curls  NV 2 min           Heel/toe raises  NV 20 HR stand; TR seated x20 20x ea  20x ea                       Ther Activity             Step up and overs  NV 4" 2x10 4" x10  6" x10                       Gait Training             Weight shifting fwd/back, side/side NV 10x3" ea                        Modalities

## 2022-04-14 ENCOUNTER — OFFICE VISIT (OUTPATIENT)
Dept: FAMILY MEDICINE CLINIC | Facility: CLINIC | Age: 37
End: 2022-04-14
Payer: COMMERCIAL

## 2022-04-14 VITALS
DIASTOLIC BLOOD PRESSURE: 70 MMHG | OXYGEN SATURATION: 97 % | SYSTOLIC BLOOD PRESSURE: 100 MMHG | TEMPERATURE: 97.9 F | HEART RATE: 88 BPM | BODY MASS INDEX: 27.48 KG/M2 | HEIGHT: 66 IN | WEIGHT: 171 LBS

## 2022-04-14 DIAGNOSIS — H65.192 OTHER NON-RECURRENT ACUTE NONSUPPURATIVE OTITIS MEDIA OF LEFT EAR: Primary | ICD-10-CM

## 2022-04-14 PROCEDURE — 99214 OFFICE O/P EST MOD 30 MIN: CPT | Performed by: NURSE PRACTITIONER

## 2022-04-14 RX ORDER — AMOXICILLIN 875 MG/1
875 TABLET, COATED ORAL 2 TIMES DAILY
Qty: 14 TABLET | Refills: 0 | Status: SHIPPED | OUTPATIENT
Start: 2022-04-14 | End: 2022-04-21

## 2022-04-14 NOTE — PROGRESS NOTES
Assessment/Plan:     Diagnoses and all orders for this visit:    Other non-recurrent acute nonsuppurative otitis media of left ear  -     amoxicillin (AMOXIL) 875 mg tablet; Take 1 tablet (875 mg total) by mouth 2 (two) times a day for 7 days        Discussed with patient plan to treat with 7 day course of amoxicillin  Patient instructed to call if no improvement in 72 hours or symptoms worsen    Subjective:      Patient ID: Bernice Taylor is a 31 y o  female  31 y  o female presenting with decreased hearing, ear pain and sensation of ear cracling for the past 2 weeks  She denies fever, chills, generalized body aches, respiratory or GI symptoms being associated  She has not used any type of over the counter medication to treat sympotoms         Family History   Problem Relation Age of Onset   Gregory Hoops Cancer Mother     Melanoma Mother     Diabetes Father     Hypertension Father     Hyperlipidemia Brother     Ovarian cancer Paternal Grandmother     Cancer Paternal Uncle     Diabetes Family     Hypertension Family      Social History     Socioeconomic History    Marital status:      Spouse name: Not on file    Number of children: Not on file    Years of education: Not on file    Highest education level: Not on file   Occupational History    Not on file   Tobacco Use    Smoking status: Never Smoker    Smokeless tobacco: Never Used   Vaping Use    Vaping Use: Never used   Substance and Sexual Activity    Alcohol use: Yes     Comment: social    Drug use: Never    Sexual activity: Yes     Partners: Male     Birth control/protection: Female Sterilization     Comment: defer   Other Topics Concern    Not on file   Social History Narrative    ** Merged History Encounter **          Social Determinants of Health     Financial Resource Strain: Not on file   Food Insecurity: Not on file   Transportation Needs: Not on file   Physical Activity: Not on file   Stress: Not on file   Social Connections: Not on file   Intimate Partner Violence: Not on file   Housing Stability: Not on file     E-Cigarette/Vaping    E-Cigarette Use Never User      E-Cigarette/Vaping Substances    Nicotine No     THC No     CBD No     Flavoring No     Other No     Unknown No      Past Medical History:   Diagnosis Date    Abnormal Pap smear of cervix 02/2018    [facility]    Ankle pain     Anxiety     Asthma     controlled    Cervical dysplasia 2019    s/p LAVH B/L salpingectomy due to persistent cervical dysplasia - pathology ERNIE 2-3    Cervical lesion     Degeneration, intervertebral disc, lumbar     Depression     Fracture tibia/fibula     right    History of COVID-19 01/07/2022    started with symptoms per pt 1/2/22 postnasal drip and coughing/ 1/14 postnasal drips persists but better per pt    HPV (human papilloma virus) infection 02/2018    (+) type 16; negative type 18 and other HRHPV    Rosacea     Work related injury 05/2020     Past Surgical History:   Procedure Laterality Date    ABDOMINOPLASTY N/A 3/1/2022    Procedure: ABDOMINOPLASTY;  Surgeon: José Soliz MD;  Location: [address] [place] [address] MAIN OR;  Service: Plastics    CERVICAL BIOPSY  W/ LOOP ELECTRODE EXCISION  03/2018    [facility] N/A 6/21/2019    Procedure: Wilhemina Abena;  Surgeon: Jocelyn King MD;  [facility] MA[facility];  Service: Gynecology    GANGLION CYST EXCISION      HAND SURGERY      HYSTERECTOMY      partial    LAPAROSCOPIC VAGINAL HYSTERECTOMY      KS CONIZATION CERVIX,LOOP ELECTRD N/A 3/23/2018    Procedure: BIOPSY LEEP CERVIX;  Surgeon: Yulissa Ibarra MD;  [facility] MA[facility];  Service: Gynecology    KS LAP,RMV  ADNEXAL STRUCTURE Bilateral 6/21/2019    Procedure: SALPINGECTOMY, LAPAROSCOPIC;  Surgeon: Jocelyn King MD;  [facility] MA[facility];  Service: Gynecology    KS LAP,VAG HYST,UTERUS 250GMS/<,SALP-OOPH N/A 6/21/2019    Procedure: HYSTERECTOMY LAPAROSCOPIC ASSISTED VAGINAL (LAVH);   Surgeon: Jocelyn King MD;  [facility];  Service: Gynecology    DE SUCT NAHOMY LIPECTOMY,LOW EXTREM Bilateral 3/1/2022    Procedure: LIPOSUCTION OF THIGH;  Surgeon: Claudio Gomez MD;  Location: [address] [place] [address] MAIN OR;  Service: [facility] SHAFT FX, INTRAMED IMPLANT Right 5/18/2020    Procedure: INSERTION NAIL IM TIBIA;  Surgeon: Venecia Olivares MD;  [facility] MA[facility];  Service: Orthopedics    RHINOPLASTY      SHOULDER SURGERY Right     anchor inserted    TIBIA HARDWARE REMOVAL Right 3/23/2022    Procedure: REMOVAL HARDWARE TIBIA;  Surgeon: Nenita Mello MD;  [facility];  Service: Orthopedics    TOOTH EXTRACTION      WOUND DEBRIDEMENT Right 5/18/2020    Procedure: DEBRIDEMENT LOWER EXTREMITY [facility] OUT); Surgeon: Venecia Olivares MD;  [facility];  Service: Orthopedics     Allergies   Allergen Reactions    Cat Hair Extract Allergic Rhinitis    Pollen Extract Allergic Rhinitis    Nickel Itching     Redness/puffy to ears/ redness to skin if exposed to Nickel       Current Outpatient Medications:     albuterol (PROVENTIL HFA,VENTOLIN HFA) 90 mcg/act inhaler, Inhale 2 puffs every 6 (six) hours as needed for wheezing, Disp: 18 g, Rfl: 5    fluticasone (FLONASE) 50 mcg/act nasal spray, 1 spray into each nostril daily as needed for allergies, Disp: 1 Bottle, Rfl: 5    Symbicort 160-4 5 MCG/ACT inhaler, Inhale 2 puffs 2 (two) times a day Rinse mouth after use , Disp: 10 2 g, Rfl: 5    acetaminophen (TYLENOL) 325 mg tablet, Take 3 tablets (975 mg total) by mouth every 8 (eight) hours, Disp: 30 tablet, Rfl: 0    amoxicillin (AMOXIL) 875 mg tablet, Take 1 tablet (875 mg total) by mouth 2 (two) times a day for 7 days, Disp: 14 tablet, Rfl: 0    Review of Systems   HENT: Positive for ear pain and hearing loss  Negative for congestion and ear discharge  All other systems reviewed and are negative        Objective:    /70 (BP Location: Left arm, Patient Position: Standing, Cuff Size: Standard)   Pulse 88   Temp 97 9 °F (36 6 °C)   Ht 5' 6" (1 676 m)   Wt 77 6 kg (171 lb)   LMP 05/01/2019 (Exact Date)   SpO2 97%   BMI 27 60 kg/m² (Reviewed)     Physical Exam  Vitals reviewed  Constitutional:       General: She is not in acute distress  Appearance: She is well-developed and well-groomed  She is not ill-appearing  HENT:      Head: Normocephalic and atraumatic  Right Ear: Tympanic membrane, ear canal and external ear normal       Left Ear: External ear normal  Decreased hearing noted  Swelling and tenderness present  No drainage  Tympanic membrane is erythematous  Nose: Nose normal       Mouth/Throat:      Mouth: Mucous membranes are moist       Pharynx: Oropharynx is clear  Eyes:      General: Lids are normal       Extraocular Movements: Extraocular movements intact  Conjunctiva/sclera: Conjunctivae normal       Pupils: Pupils are equal, round, and reactive to light  Neck:      Thyroid: No thyromegaly or thyroid tenderness  Trachea: Trachea and phonation normal    Cardiovascular:      Rate and Rhythm: Normal rate and regular rhythm  Heart sounds: Normal heart sounds  Pulmonary:      Effort: Pulmonary effort is normal       Breath sounds: Normal breath sounds  Skin:     General: Skin is warm and dry  Neurological:      General: No focal deficit present  Mental Status: She is alert and oriented to person, place, and time  Psychiatric:         Mood and Affect: Mood normal          Behavior: Behavior normal  Behavior is cooperative

## 2022-04-18 ENCOUNTER — OFFICE VISIT (OUTPATIENT)
Dept: PHYSICAL THERAPY | Facility: CLINIC | Age: 37
End: 2022-04-18
Payer: COMMERCIAL

## 2022-04-18 DIAGNOSIS — T84.84XA PAINFUL ORTHOPAEDIC HARDWARE (HCC): Primary | ICD-10-CM

## 2022-04-18 DIAGNOSIS — Z98.890 HISTORY OF REMOVAL OF RETAINED HARDWARE: ICD-10-CM

## 2022-04-18 PROCEDURE — 97112 NEUROMUSCULAR REEDUCATION: CPT | Performed by: PHYSICAL THERAPIST

## 2022-04-18 PROCEDURE — 97530 THERAPEUTIC ACTIVITIES: CPT | Performed by: PHYSICAL THERAPIST

## 2022-04-18 PROCEDURE — 97110 THERAPEUTIC EXERCISES: CPT | Performed by: PHYSICAL THERAPIST

## 2022-04-18 PROCEDURE — 97140 MANUAL THERAPY 1/> REGIONS: CPT | Performed by: PHYSICAL THERAPIST

## 2022-04-18 NOTE — PROGRESS NOTES
Daily Note     Today's date: 2022  Patient name: Gunner Ritchie  : 1985  MRN: [id number]  Referring provider: Nancy Galeano MD  Dx:   Encounter Diagnosis     ICD-10-CM    1  Painful orthopaedic hardware ([address] )  T84 84XA    2  History of removal of retained hardware  Z98 890                   Subjective: Patient states she is not as sore as expected today due to moving a lot and packing this weekend  Objective: See treatment diary below      Assessment: Tolerated treatment well  Patient would benefit from continued PT  Patient tolerates session well  She had mildly increased difficulty with yellow foam and increased fatigue with wobble board  No pain noted with manuals though tightness noted  Plan: Continue per plan of care  Precautions:  gentle ROM and strengthening exercises as tolerated   May be WBAT on right lower extremity        Manuals         R Ankle  stretching NV 10' 8' 8' 8'        Posterior talar mobes gentle NV NV 2' 2' 2'                                  Neuro Re-Ed             SLS NV NV 15" x3 Green foam 15" x3  Yellow foam  15" x4         Steamboats    NV Yellow loop 3 way x 10 bl  Yellow 3 way 10x bl         biodex LOS NV x2 small best 45% 56% best x2 76% best x 2 medium  74% best of 2  large       Wobble board side/side      2'                                                Ther Ex             Bike/TM NV 5' 5' 5' 5'        Ankle TB 4 way  R TB 2x10  RTB 2x10 ea HEP           gastroc Sb stretch Towel  20" x3 SB 20"x3 SB 20"X3 Sb 20" x3 SB 20"x3        Soleus SB stretch towel  Sb 20"X3 sb 20"X3  20" x3         Plantar fascia towel stretch  20"x3 20"x3           Towel curls  NV 2 min           Heel/toe raises  NV 20 HR stand; TR seated x20 20x ea  20x ea  20x ea                      Ther Activity             Step up and overs  NV 4" 2x10 4" x10  6" x10                       Gait Training             Weight shifting fwd/back, side/side NV 10x3" ea                        Modalities

## 2022-04-19 LAB
LAB AP GYN PRIMARY INTERPRETATION: NORMAL
LAB AP LMP: NORMAL
Lab: NORMAL
PATH INTERP SPEC-IMP: NORMAL

## 2022-04-20 ENCOUNTER — OFFICE VISIT (OUTPATIENT)
Dept: PHYSICAL THERAPY | Facility: CLINIC | Age: 37
End: 2022-04-20
Payer: COMMERCIAL

## 2022-04-20 DIAGNOSIS — T84.84XA PAINFUL ORTHOPAEDIC HARDWARE (HCC): Primary | ICD-10-CM

## 2022-04-20 DIAGNOSIS — B96.89 BACTERIAL VAGINOSIS: Primary | ICD-10-CM

## 2022-04-20 DIAGNOSIS — N76.0 BACTERIAL VAGINOSIS: Primary | ICD-10-CM

## 2022-04-20 DIAGNOSIS — Z98.890 HISTORY OF REMOVAL OF RETAINED HARDWARE: ICD-10-CM

## 2022-04-20 PROCEDURE — 97112 NEUROMUSCULAR REEDUCATION: CPT | Performed by: PHYSICAL THERAPIST

## 2022-04-20 PROCEDURE — 97140 MANUAL THERAPY 1/> REGIONS: CPT | Performed by: PHYSICAL THERAPIST

## 2022-04-20 PROCEDURE — 97530 THERAPEUTIC ACTIVITIES: CPT | Performed by: PHYSICAL THERAPIST

## 2022-04-20 PROCEDURE — 97110 THERAPEUTIC EXERCISES: CPT | Performed by: PHYSICAL THERAPIST

## 2022-04-20 RX ORDER — METRONIDAZOLE 500 MG/1
500 TABLET ORAL EVERY 12 HOURS SCHEDULED
Qty: 14 TABLET | Refills: 0 | Status: SHIPPED | OUTPATIENT
Start: 2022-04-20 | End: 2022-04-27

## 2022-04-20 NOTE — PROGRESS NOTES
Daily Note and Discharge      Today's date: 2022  Patient name: Tisha Flores  : 1985  MRN: [id number]  Referring provider: Merrily Jeans, MD  Dx:   Encounter Diagnosis     ICD-10-CM    1  Painful orthopaedic hardware ([address] )  T84 84XA    2  History of removal of retained hardware  Z98 890                   Subjective: Patient states she is feeling ok, just a bit tired  Objective: See treatment diary below      Assessment: Tolerated treatment well  Patient would benefit from continued PT  However she is moving away to [facility] this week  She notes she still has soreness into ankle though tolerates exercises  She has good tolerance to all progressions made today without increased soreness or difficulty  Patient given HEP and discussed to seek additional PT when she moves  Will be DC at this time  Plan: DC with Hep due to moving     Precautions:  gentle ROM and strengthening exercises as tolerated   May be WBAT on right lower extremity        Manuals        R Ankle  stretching NV 10' 8' 8' 8' 8'       Posterior talar mobes gentle NV NV 2' 2' 2' 3'                                 Neuro Re-Ed             SLS NV NV 15" x3 Green foam 15" x3  Yellow foam  15" x4  yelow forma 15 x3        Steamboats    NV Yellow loop 3 way x 10 bl  Yellow 3 way 10x bl  Red TB 3 way 10x bl       biodex LOS NV x2 small best 45% 56% best x2 76% best x 2 medium  74% best of 2  Large  71% best of 2        Wobble board side/side      2'  2'       BOSU squats       2x10                                 Ther Ex             Bike/TM NV 5' 5' 5' 5' 5'       Ankle TB 4 way  R TB 2x10  RTB 2x10 ea HEP           gastroc Sb stretch Towel  20" x3 SB 20"x3 SB 20"X3 Sb 20" x3 SB 20"x3 SB 20"X3       Soleus SB stretch towel  Sb 20"X3 sb 20"X3  20" x3  20" x3        Plantar fascia towel stretch  20"x3 20"x3           Towel curls  NV 2 min           Heel/toe raises  NV 20 HR stand; TR seated x20 20x ea  20x ea 20x ea  20x ea  On [employer] foam                    Ther Activity             Step up and overs  NV 4" 2x10 4" x10  6" x10                       Gait Training             Weight shifting fwd/back, side/side NV 10x3" ea                        Modalities

## 2022-04-21 ENCOUNTER — TELEPHONE (OUTPATIENT)
Dept: FAMILY MEDICINE CLINIC | Facility: CLINIC | Age: 37
End: 2022-04-21

## 2022-04-21 NOTE — TELEPHONE ENCOUNTER
I spoke with patient, she states that she takes Flonase daily  She states she is moving to [place], [place] on Wednesday, so if you would please send a prescription she would appreciate it

## 2022-04-21 NOTE — TELEPHONE ENCOUNTER
Pt called stating she was seen on 4/14 for an ear ache and prescribed abx's  States she finished abx's yesterday but still continues with crackling and irritation      Asking what she can do

## 2022-04-21 NOTE — TELEPHONE ENCOUNTER
I recommend she continue to use Flonase one to two times daily   It will take up to 6 weeks for symptoms to resolved

## 2022-04-21 NOTE — TELEPHONE ENCOUNTER
Symptoms do not sound related to ear infection, symptoms sound like eustachian tube dysfunction   Recommend use of Flonase, it can take 6 weeks for those symptoms to resolve

## 2022-04-22 DIAGNOSIS — J30.89 NON-SEASONAL ALLERGIC RHINITIS DUE TO OTHER ALLERGIC TRIGGER: ICD-10-CM

## 2022-04-22 RX ORDER — FLUTICASONE PROPIONATE 50 MCG
1 SPRAY, SUSPENSION (ML) NASAL DAILY PRN
Qty: 16 G | Refills: 1 | Status: SHIPPED | OUTPATIENT
Start: 2022-04-22

## 2022-04-22 NOTE — TELEPHONE ENCOUNTER
Patient requested refill on larger bottle of Flonase  She is moving to [place] on Wednesday, 04/27/2022

## 2022-08-09 NOTE — TELEPHONE ENCOUNTER
Pt called and said she thinks she has a yeast infection, itching, whiit discharge  Would like to make appt  Azathioprine Counseling:  I discussed with the patient the risks of azathioprine including but not limited to myelosuppression, immunosuppression, hepatotoxicity, lymphoma, and infections.  The patient understands that monitoring is required including baseline LFTs, Creatinine, possible TPMP genotyping and weekly CBCs for the first month and then every 2 weeks thereafter.  The patient verbalized understanding of the proper use and possible adverse effects of azathioprine.  All of the patient's questions and concerns were addressed.

## 2022-12-16 ENCOUNTER — TELEPHONE (OUTPATIENT)
Dept: OBGYN CLINIC | Facility: HOSPITAL | Age: 37
End: 2022-12-16

## 2022-12-16 NOTE — TELEPHONE ENCOUNTER
Caller: Clarita Holm- [facility]    Doctor/Office: Asmita    #: [phone]      What needs to be faxed: release of records form    ATTN to: Clraita Holm    Fax#: [phone]

## 2022-12-16 NOTE — TELEPHONE ENCOUNTER
Spoke to Gilmer at [facility]  She faxed me a medical records release form signed by the patient to obtain our ortho records  I faxed this to [facility] to process

## 2023-04-27 ENCOUNTER — ANESTHESIA EVENT (OUTPATIENT)
Dept: PERIOP | Facility: HOSPITAL | Age: 38
End: 2023-04-27

## 2023-04-27 RX ORDER — MULTIVIT-MIN/IRON FUM/FOLIC AC 7.5 MG-4
1 TABLET ORAL DAILY
COMMUNITY

## 2023-04-27 NOTE — PRE-PROCEDURE INSTRUCTIONS
Pre-Surgery Instructions:   Medication Instructions    acetaminophen (TYLENOL) 325 mg tablet Uses PRN- OK to take day of surgery    albuterol (PROVENTIL HFA,VENTOLIN HFA) 90 mcg/act inhaler Uses PRN- OK to take day of surgery    fluticasone (FLONASE) 50 mcg/act nasal spray Uses PRN- OK to take day of surgery    Multiple Vitamins-Minerals (multivitamin with minerals) tablet Instructions provided by MD    Symbicort 160-4 5 MCG/ACT inhaler Uses PRN- OK to take day of surgery    Medication instructions for day surgery reviewed  Please use only a sip of water to take your instructed medications  Avoid all over the counter vitamins, supplements and NSAIDS for one week prior to surgery per anesthesia guidelines  Tylenol is ok to take as needed  You will receive a call one business day prior to surgery with an arrival time and hospital directions  If your surgery is scheduled on a Monday, the hospital will be calling you on the Friday prior to your surgery  If you have not heard from anyone by 8pm, please call the hospital supervisor through the hospital  at [phone]  Edelmira Major [phone])  Do not eat or drink anything after midnight the night before your surgery, including candy, mints, lifesavers, or chewing gum  Do not drink alcohol 24hrs before your surgery  Try not to smoke at least 24hrs before your surgery  Follow the pre surgery showering instructions as listed in the [facility] Surgical Experience Booklet or otherwise provided by your surgeon's office  Do not shave the surgical area 24 hours before surgery  Do not apply any lotions, creams, including makeup, cologne, deodorant, or perfumes after showering on the day of your surgery  No contact lenses, eye make-up, or artificial eyelashes  Remove nail polish, including gel polish, and any artificial, gel, or acrylic nails if possible  Remove all jewelry including rings and body piercing jewelry       Wear causal clothing that is easy to take on and off  Consider your type of surgery  Keep any valuables, jewelry, piercings at home  Please bring any specially ordered equipment (sling, braces) if indicated  Arrange for a responsible person to drive you to and from the hospital on the day of your surgery  Visitor Guidelines discussed  Call the surgeon's office with any new illnesses, exposures, or additional questions prior to surgery  Please reference your [facility] Surgical Experience Booklet for additional information to prepare for your upcoming surgery

## 2023-05-04 NOTE — ANESTHESIA PREPROCEDURE EVALUATION
Procedure:  LIPOSUCTION OF FULL BACK & ABDOMEN (Abdomen)    Relevant Problems   MUSCULOSKELETAL   (+) DDD (degenerative disc disease), cervical   (+) Disc degeneration, lumbar      NEURO/PSYCH   (+) PTSD (post-traumatic stress disorder)   (+) Panic disorder without agoraphobia      PULMONARY   (+) Moderate persistent asthma without complication      Respiratory   (+) Allergic rhinitis      Endocrine   (+) Diffuse nontoxic goiter      Musculoskeletal and Integument   (+) Open fracture of right tibia and fibula   (+) Trochanteric bursitis of right hip      Genitourinary   (+) Cervical dysplasia   (+) Moderate dysplasia of cervix (ERNIE II)      Other   (+) Encounter for gynecological examination (general) (routine) without abnormal findings   (+) Painful orthopaedic hardware ([address] )   (+) S/P LEEP of cervix   (+) Status post open reduction and internal fixation (ORIF) of fracture        Physical Exam    Airway    Mallampati score: II  TM Distance: <3 FB  Neck ROM: full     Dental   No notable dental hx     Cardiovascular  Cardiovascular exam normal    Pulmonary  Pulmonary exam normal     Other Findings        Anesthesia Plan  ASA Score- 2     Anesthesia Type- general with ASA Monitors  Additional Monitors:   Airway Plan: ETT  Plan Factors-Exercise tolerance (METS): >4 METS  Chart reviewed  Existing labs reviewed  Patient is not a current smoker  Patient not instructed to abstain from smoking on day of procedure  Patient did not smoke on day of surgery  Induction- intravenous  Postoperative Plan-     Informed Consent- Anesthetic plan and risks discussed with patient  I personally reviewed this patient with the CRNA  Discussed and agreed on the Anesthesia Plan with the CRNA  Terri Smith

## 2023-05-05 ENCOUNTER — HOSPITAL ENCOUNTER (OUTPATIENT)
Facility: HOSPITAL | Age: 38
Setting detail: OUTPATIENT SURGERY
Discharge: HOME | End: 2023-05-05
Attending: PLASTIC SURGERY | Admitting: PLASTIC SURGERY

## 2023-05-05 ENCOUNTER — ANESTHESIA (OUTPATIENT)
Dept: PERIOP | Facility: HOSPITAL | Age: 38
End: 2023-05-05

## 2023-05-05 VITALS
DIASTOLIC BLOOD PRESSURE: 64 MMHG | OXYGEN SATURATION: 96 % | HEIGHT: 68 IN | BODY MASS INDEX: 25.76 KG/M2 | RESPIRATION RATE: 16 BRPM | HEART RATE: 71 BPM | SYSTOLIC BLOOD PRESSURE: 122 MMHG | WEIGHT: 170 LBS | TEMPERATURE: 96.6 F

## 2023-05-05 RX ORDER — PROPOFOL 10 MG/ML
INJECTION, EMULSION INTRAVENOUS AS NEEDED
Status: DISCONTINUED | OUTPATIENT
Start: 2023-05-05 | End: 2023-05-05

## 2023-05-05 RX ORDER — ONDANSETRON 2 MG/ML
INJECTION INTRAMUSCULAR; INTRAVENOUS AS NEEDED
Status: DISCONTINUED | OUTPATIENT
Start: 2023-05-05 | End: 2023-05-05

## 2023-05-05 RX ORDER — SODIUM CHLORIDE, SODIUM LACTATE, POTASSIUM CHLORIDE, CALCIUM CHLORIDE 600; 310; 30; 20 MG/100ML; MG/100ML; MG/100ML; MG/100ML
125 INJECTION, SOLUTION INTRAVENOUS CONTINUOUS
Status: DISCONTINUED | OUTPATIENT
Start: 2023-05-05 | End: 2023-05-05 | Stop reason: HOSPADM

## 2023-05-05 RX ORDER — SCOLOPAMINE TRANSDERMAL SYSTEM 1 MG/1
1 PATCH, EXTENDED RELEASE TRANSDERMAL ONCE
Status: DISCONTINUED | OUTPATIENT
Start: 2023-05-05 | End: 2023-05-05 | Stop reason: HOSPADM

## 2023-05-05 RX ORDER — SODIUM CHLORIDE, SODIUM LACTATE, POTASSIUM CHLORIDE, CALCIUM CHLORIDE 600; 310; 30; 20 MG/100ML; MG/100ML; MG/100ML; MG/100ML
INJECTION, SOLUTION INTRAVENOUS CONTINUOUS PRN
Status: DISCONTINUED | OUTPATIENT
Start: 2023-05-05 | End: 2023-05-05

## 2023-05-05 RX ORDER — ONDANSETRON 2 MG/ML
4 INJECTION INTRAMUSCULAR; INTRAVENOUS EVERY 8 HOURS PRN
Status: DISCONTINUED | OUTPATIENT
Start: 2023-05-05 | End: 2023-05-05 | Stop reason: HOSPADM

## 2023-05-05 RX ORDER — ACETAMINOPHEN 325 MG/1
650 TABLET ORAL EVERY 6 HOURS PRN
Status: DISCONTINUED | OUTPATIENT
Start: 2023-05-05 | End: 2023-05-05 | Stop reason: HOSPADM

## 2023-05-05 RX ORDER — SCOLOPAMINE TRANSDERMAL SYSTEM 1 MG/1
PATCH, EXTENDED RELEASE TRANSDERMAL
Status: COMPLETED
Start: 2023-05-05 | End: 2023-05-05

## 2023-05-05 RX ORDER — MINERAL OIL
OIL (ML) MISCELLANEOUS AS NEEDED
Status: DISCONTINUED | OUTPATIENT
Start: 2023-05-05 | End: 2023-05-05 | Stop reason: HOSPADM

## 2023-05-05 RX ORDER — PHENYLEPHRINE HCL IN 0.9% NACL 1 MG/10 ML
SYRINGE (ML) INTRAVENOUS AS NEEDED
Status: DISCONTINUED | OUTPATIENT
Start: 2023-05-05 | End: 2023-05-05

## 2023-05-05 RX ORDER — FENTANYL CITRATE/PF 50 MCG/ML
50 SYRINGE (ML) INJECTION
Status: DISCONTINUED | OUTPATIENT
Start: 2023-05-05 | End: 2023-05-05 | Stop reason: HOSPADM

## 2023-05-05 RX ORDER — ONDANSETRON 2 MG/ML
4 INJECTION INTRAMUSCULAR; INTRAVENOUS ONCE AS NEEDED
Status: DISCONTINUED | OUTPATIENT
Start: 2023-05-05 | End: 2023-05-05 | Stop reason: HOSPADM

## 2023-05-05 RX ORDER — GABAPENTIN 300 MG/1
CAPSULE ORAL
Status: COMPLETED
Start: 2023-05-05 | End: 2023-05-05

## 2023-05-05 RX ORDER — DEXMEDETOMIDINE HYDROCHLORIDE 100 UG/ML
INJECTION, SOLUTION INTRAVENOUS AS NEEDED
Status: DISCONTINUED | OUTPATIENT
Start: 2023-05-05 | End: 2023-05-05

## 2023-05-05 RX ORDER — LIDOCAINE HYDROCHLORIDE 10 MG/ML
INJECTION, SOLUTION EPIDURAL; INFILTRATION; INTRACAUDAL; PERINEURAL AS NEEDED
Status: DISCONTINUED | OUTPATIENT
Start: 2023-05-05 | End: 2023-05-05

## 2023-05-05 RX ORDER — ACETAMINOPHEN 325 MG/1
TABLET ORAL
Status: COMPLETED
Start: 2023-05-05 | End: 2023-05-05

## 2023-05-05 RX ORDER — PROPOFOL 10 MG/ML
INJECTION, EMULSION INTRAVENOUS CONTINUOUS PRN
Status: DISCONTINUED | OUTPATIENT
Start: 2023-05-05 | End: 2023-05-05

## 2023-05-05 RX ORDER — ONDANSETRON 4 MG/1
TABLET, ORALLY DISINTEGRATING ORAL
Status: COMPLETED
Start: 2023-05-05 | End: 2023-05-05

## 2023-05-05 RX ORDER — DEXAMETHASONE SODIUM PHOSPHATE 10 MG/ML
INJECTION, SOLUTION INTRAMUSCULAR; INTRAVENOUS AS NEEDED
Status: DISCONTINUED | OUTPATIENT
Start: 2023-05-05 | End: 2023-05-05

## 2023-05-05 RX ORDER — MAGNESIUM HYDROXIDE 1200 MG/15ML
LIQUID ORAL AS NEEDED
Status: DISCONTINUED | OUTPATIENT
Start: 2023-05-05 | End: 2023-05-05 | Stop reason: HOSPADM

## 2023-05-05 RX ORDER — HYDROMORPHONE HCL/PF 1 MG/ML
SYRINGE (ML) INJECTION AS NEEDED
Status: DISCONTINUED | OUTPATIENT
Start: 2023-05-05 | End: 2023-05-05

## 2023-05-05 RX ORDER — CEFAZOLIN SODIUM 2 G/50ML
2000 SOLUTION INTRAVENOUS ONCE
Status: COMPLETED | OUTPATIENT
Start: 2023-05-05 | End: 2023-05-05

## 2023-05-05 RX ORDER — OXYCODONE HYDROCHLORIDE 5 MG/1
5 TABLET ORAL EVERY 4 HOURS PRN
Status: DISCONTINUED | OUTPATIENT
Start: 2023-05-05 | End: 2023-05-05 | Stop reason: HOSPADM

## 2023-05-05 RX ORDER — MIDAZOLAM HYDROCHLORIDE 2 MG/2ML
INJECTION, SOLUTION INTRAMUSCULAR; INTRAVENOUS AS NEEDED
Status: DISCONTINUED | OUTPATIENT
Start: 2023-05-05 | End: 2023-05-05

## 2023-05-05 RX ORDER — FENTANYL CITRATE 50 UG/ML
INJECTION, SOLUTION INTRAMUSCULAR; INTRAVENOUS AS NEEDED
Status: DISCONTINUED | OUTPATIENT
Start: 2023-05-05 | End: 2023-05-05

## 2023-05-05 RX ORDER — HYDROMORPHONE HCL/PF 1 MG/ML
0.5 SYRINGE (ML) INJECTION
Status: DISCONTINUED | OUTPATIENT
Start: 2023-05-05 | End: 2023-05-05 | Stop reason: HOSPADM

## 2023-05-05 RX ORDER — OXYCODONE HYDROCHLORIDE 5 MG/1
10 TABLET ORAL EVERY 4 HOURS PRN
Status: DISCONTINUED | OUTPATIENT
Start: 2023-05-05 | End: 2023-05-05 | Stop reason: HOSPADM

## 2023-05-05 RX ADMIN — HYDROMORPHONE HYDROCHLORIDE 0.5 MG: 1 INJECTION, SOLUTION INTRAMUSCULAR; INTRAVENOUS; SUBCUTANEOUS at 14:26

## 2023-05-05 RX ADMIN — SODIUM CHLORIDE, SODIUM LACTATE, POTASSIUM CHLORIDE, AND CALCIUM CHLORIDE: .6; .31; .03; .02 INJECTION, SOLUTION INTRAVENOUS at 14:47

## 2023-05-05 RX ADMIN — GABAPENTIN 300 MG: 300 CAPSULE ORAL at 09:34

## 2023-05-05 RX ADMIN — FENTANYL CITRATE 50 MCG: 50 INJECTION, SOLUTION INTRAMUSCULAR; INTRAVENOUS at 14:02

## 2023-05-05 RX ADMIN — SCOPALAMINE 1 PATCH: 1 PATCH, EXTENDED RELEASE TRANSDERMAL at 09:37

## 2023-05-05 RX ADMIN — SODIUM CHLORIDE 0.2 MCG/KG/MIN: 9 INJECTION, SOLUTION INTRAVENOUS at 12:27

## 2023-05-05 RX ADMIN — Medication 100 MCG: at 12:48

## 2023-05-05 RX ADMIN — SODIUM CHLORIDE, SODIUM LACTATE, POTASSIUM CHLORIDE, AND CALCIUM CHLORIDE: .6; .31; .03; .02 INJECTION, SOLUTION INTRAVENOUS at 12:10

## 2023-05-05 RX ADMIN — DEXMEDETOMIDINE HYDROCHLORIDE 8 MCG: 100 INJECTION, SOLUTION INTRAVENOUS at 12:27

## 2023-05-05 RX ADMIN — CEFAZOLIN SODIUM 2000 MG: 2 SOLUTION INTRAVENOUS at 12:17

## 2023-05-05 RX ADMIN — ONDANSETRON 4 MG: 4 TABLET, ORALLY DISINTEGRATING ORAL at 09:34

## 2023-05-05 RX ADMIN — Medication 100 MCG: at 13:07

## 2023-05-05 RX ADMIN — HYDROMORPHONE HYDROCHLORIDE 0.5 MG: 1 INJECTION, SOLUTION INTRAMUSCULAR; INTRAVENOUS; SUBCUTANEOUS at 13:52

## 2023-05-05 RX ADMIN — HYDROMORPHONE HYDROCHLORIDE 0.5 MG: 1 INJECTION, SOLUTION INTRAMUSCULAR; INTRAVENOUS; SUBCUTANEOUS at 14:22

## 2023-05-05 RX ADMIN — ACETAMINOPHEN 650 MG: 325 TABLET ORAL at 16:45

## 2023-05-05 RX ADMIN — ACETAMINOPHEN 650 MG: 325 TABLET ORAL at 09:34

## 2023-05-05 RX ADMIN — HYDROMORPHONE HYDROCHLORIDE 0.5 MG: 1 INJECTION, SOLUTION INTRAMUSCULAR; INTRAVENOUS; SUBCUTANEOUS at 13:32

## 2023-05-05 RX ADMIN — LIDOCAINE HYDROCHLORIDE 50 MG: 10 INJECTION, SOLUTION EPIDURAL; INFILTRATION; INTRACAUDAL; PERINEURAL at 12:27

## 2023-05-05 RX ADMIN — ONDANSETRON 4 MG: 2 INJECTION INTRAMUSCULAR; INTRAVENOUS at 12:27

## 2023-05-05 RX ADMIN — MIDAZOLAM 2 MG: 1 INJECTION INTRAMUSCULAR; INTRAVENOUS at 12:17

## 2023-05-05 RX ADMIN — FENTANYL CITRATE 50 MCG: 50 INJECTION, SOLUTION INTRAMUSCULAR; INTRAVENOUS at 14:00

## 2023-05-05 RX ADMIN — Medication 100 MCG: at 13:03

## 2023-05-05 RX ADMIN — SODIUM CHLORIDE, SODIUM LACTATE, POTASSIUM CHLORIDE, AND CALCIUM CHLORIDE: .6; .31; .03; .02 INJECTION, SOLUTION INTRAVENOUS at 13:29

## 2023-05-05 RX ADMIN — SODIUM CHLORIDE, SODIUM LACTATE, POTASSIUM CHLORIDE, AND CALCIUM CHLORIDE 125 ML/HR: .6; .31; .03; .02 INJECTION, SOLUTION INTRAVENOUS at 09:34

## 2023-05-05 RX ADMIN — PROPOFOL 80 MCG/KG/MIN: 10 INJECTION, EMULSION INTRAVENOUS at 12:27

## 2023-05-05 RX ADMIN — PROPOFOL 200 MG: 10 INJECTION, EMULSION INTRAVENOUS at 12:27

## 2023-05-05 RX ADMIN — FENTANYL CITRATE 100 MCG: 50 INJECTION, SOLUTION INTRAMUSCULAR; INTRAVENOUS at 12:27

## 2023-05-05 RX ADMIN — DEXAMETHASONE SODIUM PHOSPHATE 10 MG: 10 INJECTION, SOLUTION INTRAMUSCULAR; INTRAVENOUS at 12:27

## 2023-05-05 NOTE — DISCHARGE INSTR - AVS FIRST PAGE
[address], [address] [facility], [address] [facility], [facility], [address] [address] [phone]/V / [email or web address]  [facility]       No strenuous activity 1 week    Apply gauze to incision sites daily    No ice or heating pack    Wear your binder/garment for 3 weeks    Drainage from the incision sites is normal    Avoid aspirin/motrin/advil/alleve for 1 week    No smoking    It is ok to shower    Swelling and bruising is normal    Call [phone] for an appointment in 10-14 days

## 2023-05-05 NOTE — DISCHARGE SUMMARY
Discharge Summary - Medical Erasmo Cho 30 y o  female MRN: [id number]    [facility] Room / Bed: OR POOL/OR POOL Encounter: [id number]    BRIEF OVERVIEW  Admitting Provider: Oleksandr Stanton MD  Discharge Provider: Oleksandr Stanton MD  Primary Care Physician at Discharge: Marlen Cordoba [phone]    Discharge To: Home      Admission Date: 5/5/2023     Discharge Date: No discharge date for patient encounter  Code Status: Prior  Advance Directive and Living Will: <no information>  Power of :        Primary Discharge Diagnosis  Active Problems:    * No active hospital problems  *  Resolved Problems:    * No resolved hospital problems   *        Discharge Disposition: [address] [place] [address]    Presenting Problem/History of Present Illness  <principal problem not specified>      Discharge Condition: stable    Patient tolerated the procedure well, recovered and was discharged home in stable condition    Oleksandr Stanton MD  5/5/2023  11:52 AM

## 2023-05-05 NOTE — ANESTHESIA POSTPROCEDURE EVALUATION
Post-Op Assessment Note    CV Status:  Stable  Pain Score: 0    Pain management: adequate     Mental Status:  Alert and awake   Hydration Status:  Euvolemic   PONV Controlled:  Controlled   Airway Patency:  Patent      Post Op Vitals Reviewed: Yes      Staff: CRNA         No notable events documented      /67 (05/05/23 1500)    Temp     Pulse 95 (05/05/23 1500)   Resp 12 (05/05/23 1500)    SpO2 98 % (05/05/23 1500)

## 2023-05-05 NOTE — NURSING NOTE
Pt is awake,alert,tolerated diet, medicated for mild operative pain, OOB with assistance, voided QS  Written and verbal instructions given, pt verbalizes an understanding

## 2023-05-05 NOTE — INTERVAL H&P NOTE
H&P reviewed  After examining the patient I find no changes in the patients condition since the H&P had been written      Vitals:    05/05/23 0926   BP: 122/63   Pulse: 97   Resp: 18   Temp: 99 4 °F (37 4 °C)   SpO2: 96%

## 2023-05-05 NOTE — OP NOTE
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]    Procedure(s):  LIPOSUCTION OF FULL BACK & ABDOMEN    Specimen(s):  * No specimens in log *    Estimated Blood Loss:   Minimal    Drains:  Closed/Suction Drain Left; Inferior Abdomen Bulb 15 Fr  (Active)   Number of days: 430       Anesthesia Type:   General    Operative Indications:  Lipodystrophy, 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]      Operative Findings:      Complications:   None    Procedure and Technique:  The patient was marked while standing prior to surgery  The patient was brought to the operating room and placed supine on the operating room table  Time out procedure was performed, SCDs were applied and IV antibiotics were given  After adequate anesthesia was obtained by the anesthesia staff, all appropriate pressure precautions were taken and the patient was placed in a prone position  The posterior flanks and back were prepped and draped using standard surgical technique  Stab incisions were placed in the flanks  Tumescent anesthesia was placed subcutaneously  After adequate time passed for hemostatic effect, liposuction was performed of the back and flanks leaving 1cm of fat with the skin  The lipo aspirate was a denise yellow color with minimal blood component  Excellent symmetry was obtained  The stab incisions were closed using 4-0 PDS suture in interrupted deep dermal technique  The wounds were cleaned and dried, skin glue was applied  The patient was the turned to a supine position  The abdomen and flanks and chest were prepped and draped using standard surgical technique   Stab incisions were placed in the lateral chest, supraumbilical and lower flanks  Tumescent anesthesia was placed in a subcutaneous place  After adequate time passed for hemostatic effect, liposuction was performed of the chest abdomen and flanks leaving 1cm of fat with the skin  The lipo aspirate was a denise yellow color with minimal blood component  Excellent symmetry was obtained  4900ml of total lipo aspirate was suctioned  The stab incisions were closed using 4-0 PDS suture in interrupted deep dermal technique  The wounds were cleaned and dried, skin glue was applied  Sterile gauze and an chest/abdominal garment  The patient tolerated the procedure well and was taken to the recovery room in stable condition  I was present for the entire procedure  and A qualified resident physician was not available      Patient Disposition:  hemodynamically stable and extubated and stable        SIGNATURE: Karo Hanson MD  DATE: May 5, 2023  TIME: 11:50 AM

## (undated) DEVICE — CANNULA 4MM HELIXED TRI-PORT II 30CM 10MM PORT

## (undated) DEVICE — NEEDLE BLUNT 18 G X 1 1/2IN

## (undated) DEVICE — 1840 FOAM BLOCK NEEDLE COUNTER: Brand: DEVON

## (undated) DEVICE — GLOVE SRG BIOGEL ORTHOPEDIC 8

## (undated) DEVICE — Device

## (undated) DEVICE — STERILE POLYISOPRENE POWDER-FREE SURGICAL GLOVES: Brand: PROTEXIS

## (undated) DEVICE — GLOVE SRG BIOGEL 8

## (undated) DEVICE — TRAY FOLEY 16FR URIMETER SILICONE SURESTEP

## (undated) DEVICE — MEDI-VAC YANK SUCT HNDL W/TPRD BULBOUS TIP: Brand: CARDINAL HEALTH

## (undated) DEVICE — BULB SYRINGE,IRRIGATION WITH PROTECTIVE CAP: Brand: DOVER

## (undated) DEVICE — INTENDED FOR TISSUE SEPARATION, AND OTHER PROCEDURES THAT REQUIRE A SHARP SURGICAL BLADE TO PUNCTURE OR CUT.: Brand: BARD-PARKER SAFETY BLADES SIZE 11, STERILE

## (undated) DEVICE — LIGHT GLOVE GREEN

## (undated) DEVICE — PENCIL ELECTROSURG E-Z CLEAN -0035H

## (undated) DEVICE — GLOVE INDICATOR PI UNDERGLOVE SZ 6.5 BLUE

## (undated) DEVICE — 4.2MM THREE-FLUTED DRILL BIT QC/NEEDLE POINT/145MM

## (undated) DEVICE — 3000CC GUARDIAN II: Brand: GUARDIAN

## (undated) DEVICE — PLUMEPEN PRO 10FT

## (undated) DEVICE — ADHESIVE SKIN HIGH VISCOSITY EXOFIN PRECISION PEN

## (undated) DEVICE — U-DRAPE: Brand: CONVERTORS

## (undated) DEVICE — 1820 FOAM BLOCK NEEDLE COUNTER: Brand: DEVON

## (undated) DEVICE — DRAPE EQUIPMENT RF WAND

## (undated) DEVICE — CHLORHEXIDINE 4PCT 4 OZ

## (undated) DEVICE — IMPERVIOUS STOCKINETTE: Brand: DEROYAL

## (undated) DEVICE — GLOVE INDICATOR PI UNDERGLOVE SZ 8.5 BLUE

## (undated) DEVICE — DRAIN HUBLESS 15FR 3 1/16IN

## (undated) DEVICE — PACK MAJOR ORTHO W/SPLITS PBDS

## (undated) DEVICE — SUT VICRYL PLUS 0 CTB-1 27 IN VCPB260H

## (undated) DEVICE — KERLIX BANDAGE ROLL: Brand: KERLIX

## (undated) DEVICE — SURGICEL 2 X 3

## (undated) DEVICE — CHLORAPREP HI-LITE 26ML ORANGE

## (undated) DEVICE — CONMED ACCESSORY ELECTRODE, 3/16" (5 MM) BALL: Brand: CONMED

## (undated) DEVICE — SCD SEQUENTIAL COMPRESSION COMFORT SLEEVE MEDIUM KNEE LENGTH: Brand: KENDALL SCD

## (undated) DEVICE — SUT VICRYL 3-0 REEL 54 IN J285G

## (undated) DEVICE — SUT MONOCRYL 3-0 PS-2 27 IN Y427H

## (undated) DEVICE — ACE WRAP 6 IN UNSTERILE

## (undated) DEVICE — UNDYED MONOFILAMENT (POLYDIOXANONE), ABSORBABLE SURGICAL SUTURE: Brand: PDS

## (undated) DEVICE — SPONGE LAP 18 X 18 IN STRL RFD

## (undated) DEVICE — DRAPE SHEET THREE QUARTER

## (undated) DEVICE — JACKSON-PRATT 100CC BULB RESERVOIR: Brand: CARDINAL HEALTH

## (undated) DEVICE — STERILE 8 INCH PROCTO SWAB: Brand: CARDINAL HEALTH

## (undated) DEVICE — SYRINGE 10ML LL

## (undated) DEVICE — SPONGE SCRUB 4 PCT CHLORHEXIDINE

## (undated) DEVICE — WEBRIL 6 IN UNSTERILE

## (undated) DEVICE — UTERINE MANIPULATOR RUMI 5.1 X 6 CM

## (undated) DEVICE — 3M™ STERI-STRIP™ REINFORCED ADHESIVE SKIN CLOSURES, R1547, 1/2 IN X 4 IN (12 MM X 100 MM), 6 STRIPS/ENVELOPE: Brand: 3M™ STERI-STRIP™

## (undated) DEVICE — SYRINGE 1ML TB 26G X 3/8 SAFETY

## (undated) DEVICE — SUT CHROMIC 5-0 P-3 18 IN 687G

## (undated) DEVICE — PROXIMATE PLUS MD MULTI-DIRECTIONAL RELEASE SKIN STAPLERS CONTAINS 35 STAINLESS STEEL STAPLES APPROXIMATE CLOSED DIMENSIONS: 6.9MM X 3.9MM WIDE: Brand: PROXIMATE

## (undated) DEVICE — INTENDED FOR TISSUE SEPARATION, AND OTHER PROCEDURES THAT REQUIRE A SHARP SURGICAL BLADE TO PUNCTURE OR CUT.: Brand: BARD-PARKER SAFETY BLADES SIZE 15, STERILE

## (undated) DEVICE — TROCAR: Brand: KII® SLEEVE

## (undated) DEVICE — GLOVE PI ULTRA TOUCH SZ.7.0

## (undated) DEVICE — DRESSING MEPILEX AG BORDER 4 X 4 IN

## (undated) DEVICE — TROCAR: Brand: KII SLEEVE

## (undated) DEVICE — GLOVE SRG BIOGEL 8.5

## (undated) DEVICE — SUT VICRYL 1 CTB-1 36 IN JB947

## (undated) DEVICE — SUT ETHILON 3-0 PS-1 18 IN 1663G

## (undated) DEVICE — SUPER SPONGES,MEDIUM: Brand: KERLIX

## (undated) DEVICE — TROCAR: Brand: KII FIOS FIRST ENTRY

## (undated) DEVICE — SILVER-COATED ANTIMICROBIAL BARRIER DRESSING: Brand: ACTICOAT   4" X 8"

## (undated) DEVICE — STOCKINETTE REGULAR

## (undated) DEVICE — ELECTRODE BLADE MOD E-Z CLEAN 2.5IN 6.4CM -0012M

## (undated) DEVICE — 3.2MM GUIDE WIRE 400MM

## (undated) DEVICE — DRAPE C-ARM X-RAY

## (undated) DEVICE — OCCLUSIVE GAUZE STRIP,3% BISMUTH TRIBROMOPHENATE IN PETROLATUM BLEND: Brand: XEROFORM

## (undated) DEVICE — GLOVE INDICATOR PI UNDERGLOVE SZ 8 BLUE

## (undated) DEVICE — BETHLEHEM UNIVERSAL GYN LAP PK: Brand: CARDINAL HEALTH

## (undated) DEVICE — PREMIUM DRY TRAY LF: Brand: MEDLINE INDUSTRIES, INC.

## (undated) DEVICE — DRAPE C-ARMOUR

## (undated) DEVICE — INTENDED FOR TISSUE SEPARATION, AND OTHER PROCEDURES THAT REQUIRE A SHARP SURGICAL BLADE TO PUNCTURE OR CUT.: Brand: BARD-PARKER ® SAFETYLOCK CARBON RIB-BACK BLADES

## (undated) DEVICE — PACK UNIVERSAL DRAPES SUB-Q ICD

## (undated) DEVICE — SYRINGE 30ML LL

## (undated) DEVICE — PAD CAST 4 IN COTTON NON STERILE

## (undated) DEVICE — ADHESIVE SKIN CLSR DERMABOND NX

## (undated) DEVICE — 2.5MM REAMING ROD WITH BALL TIP/950MM-STERILE

## (undated) DEVICE — ENSEAL LAPAROSCOPIC TISSUE SEALER G2 ARTICULATING CURVED JAW FOR USE WITH G2 GENERATOR 5MM DIAMETER 45CM SHAFT LENGTH: Brand: ENSEAL

## (undated) DEVICE — MAYO STAND COVER: Brand: CONVERTORS

## (undated) DEVICE — ENDOPATH 5MM ENDOSCOPIC BLUNT TIP DISSECTORS (12 POUCHES CONTAINING 3 DISSECTORS EACH): Brand: ENDOPATH

## (undated) DEVICE — ABDOMINAL PAD: Brand: DERMACEA

## (undated) DEVICE — SKIN MARKER DUAL TIP WITH RULER CAP, FLEXIBLE RULER AND LABELS: Brand: DEVON

## (undated) DEVICE — NEEDLE 25G X 1 1/2

## (undated) DEVICE — UNIVERSAL MAJOR EXTREMITY,KIT: Brand: CARDINAL HEALTH

## (undated) DEVICE — ENSEAL LAPAROSCOPIC TISSUE SEALER G2 ARTICULATING  CURVED JAW FOR USE WITH G2 GENERATOR 5MM DIAMETER 35CM SHAFT LENGTH: Brand: ENSEAL

## (undated) DEVICE — SUT VICRYL 2-0 CT-1 27 IN J259H

## (undated) DEVICE — PROXIMATE SKIN STAPLERS (35 WIDE) CONTAINS 35 STAINLESS STEEL STAPLES (FIXED HEAD): Brand: PROXIMATE

## (undated) DEVICE — SPONGE PVP SCRUB WING STERILE

## (undated) DEVICE — GLOVE INDICATOR PI UNDERGLOVE SZ 7 BLUE

## (undated) DEVICE — BANDAGE, ESMARK LF STR 6"X9' (20/CS): Brand: CYPRESS

## (undated) DEVICE — REM POLYHESIVE ADULT PATIENT RETURN ELECTRODE: Brand: VALLEYLAB

## (undated) DEVICE — ASTOUND STANDARD SURGICAL GOWN, XL: Brand: CONVERTORS

## (undated) DEVICE — BASIC SINGLE BASIN-LF: Brand: MEDLINE INDUSTRIES, INC.

## (undated) DEVICE — STANDARD SURGICAL GOWN, L: Brand: CONVERTORS

## (undated) DEVICE — ACE WRAP 6 IN STERILE

## (undated) DEVICE — POV-IOD SOLUTION 4OZ BT

## (undated) DEVICE — BASIC PACK: Brand: CONVERTORS

## (undated) DEVICE — CANNISTER WASTE IMPLOSION PROOF

## (undated) DEVICE — BETHLEHEM UNIV MAJ EXT ,KIT: Brand: CARDINAL HEALTH

## (undated) DEVICE — INTENDED FOR TISSUE SEPARATION, AND OTHER PROCEDURES THAT REQUIRE A SHARP SURGICAL BLADE TO PUNCTURE OR CUT.: Brand: BARD-PARKER SAFETY BLADES SIZE 10, STERILE

## (undated) DEVICE — SUT VICRYL 2-0 CTB-1 36 IN JB945

## (undated) DEVICE — THE SIMPULSE SOLO SYSTEM WITH ULTREX RETRACTABLE SPLASH SHIELD TIP: Brand: SIMPULSE SOLO

## (undated) DEVICE — TUBING LIPOSUCTION ASPIRATION 12FT STERILE

## (undated) DEVICE — DRAPE TOWEL: Brand: CONVERTORS

## (undated) DEVICE — TUBING INFILTRATION 9FT

## (undated) DEVICE — SUT VICRYL 3-0 SH 27 IN J416H

## (undated) DEVICE — SUT VICRYL 4-0 PS-2 27 IN J426H

## (undated) DEVICE — SUT VICRYL PLUS 2-0 CTB-1 27 IN VCPB259H

## (undated) DEVICE — CHEST/BREAST DRAPE: Brand: CONVERTORS

## (undated) DEVICE — SUT PROLENE 0 CT-1 30 IN 8424H

## (undated) DEVICE — ALL PURPOSE SPONGES,NONWOVEN, 4 PLY: Brand: CURITY

## (undated) DEVICE — ADHESIVE SKN CLSR HISTOACRYL FLEX 0.5ML LF

## (undated) DEVICE — LEEP LOOP ELECTRODE WIDE 20 X 15

## (undated) DEVICE — SUT ETHILON 3-0 FSLX 30 IN 1673H

## (undated) DEVICE — SUT ETHILON 4-0 PS-2 18 IN 1667H

## (undated) DEVICE — 3M™ IOBAN™ 2 ANTIMICROBIAL INCISE DRAPE 6650EZ: Brand: IOBAN™ 2

## (undated) DEVICE — CURITY NON-ADHERENT STRIPS: Brand: CURITY

## (undated) DEVICE — SUT VICRYL 0 CT-1 CR/8 27 IN JJ41G

## (undated) DEVICE — DISPOSABLE OR TOWEL: Brand: CARDINAL HEALTH

## (undated) DEVICE — NEEDLE 18 G X 1 1/2 SAFETY

## (undated) DEVICE — MEDI-VAC TUBING CONNECTOR 6-IN-1 STRAIGHT: Brand: CARDINAL HEALTH

## (undated) DEVICE — BINDER ABDOMINAL 46-62 IN

## (undated) DEVICE — GLOVE SRG BIOGEL 6.5

## (undated) DEVICE — IRRIG ENDO FLO TUBING

## (undated) DEVICE — NEEDLE SPINAL18G X 3.5 IN QUINCKE

## (undated) DEVICE — 4.2MM THREE-FLUTED DRILL BIT QC/NEEDLE POINT/145MM-STERILE

## (undated) DEVICE — 4.2MM THREE-FLUTED DRILL BIT QC/330MM/100MM CALIBRATION

## (undated) DEVICE — PADDING CAST 4 IN  COTTON STRL

## (undated) DEVICE — SPONGE GAUZE 4 X 9

## (undated) DEVICE — PAD GROUNDING ADULT